# Patient Record
Sex: MALE | Race: WHITE | NOT HISPANIC OR LATINO | Employment: OTHER | URBAN - NONMETROPOLITAN AREA
[De-identification: names, ages, dates, MRNs, and addresses within clinical notes are randomized per-mention and may not be internally consistent; named-entity substitution may affect disease eponyms.]

---

## 2017-03-24 ENCOUNTER — HISTORY (OUTPATIENT)
Dept: EMERGENCY MEDICINE | Facility: OTHER | Age: 62
End: 2017-03-24

## 2017-03-27 ENCOUNTER — AMBULATORY - GICH (OUTPATIENT)
Dept: LAB | Facility: OTHER | Age: 62
End: 2017-03-27

## 2017-03-27 ENCOUNTER — AMBULATORY - GICH (OUTPATIENT)
Dept: SCHEDULING | Facility: OTHER | Age: 62
End: 2017-03-27

## 2017-03-28 ENCOUNTER — AMBULATORY - GICH (OUTPATIENT)
Dept: SCHEDULING | Facility: OTHER | Age: 62
End: 2017-03-28

## 2017-04-13 ENCOUNTER — AMBULATORY - GICH (OUTPATIENT)
Dept: SCHEDULING | Facility: OTHER | Age: 62
End: 2017-04-13

## 2017-04-14 ENCOUNTER — AMBULATORY - GICH (OUTPATIENT)
Dept: SCHEDULING | Facility: OTHER | Age: 62
End: 2017-04-14

## 2017-04-17 ENCOUNTER — AMBULATORY - GICH (OUTPATIENT)
Dept: SCHEDULING | Facility: OTHER | Age: 62
End: 2017-04-17

## 2017-04-18 ENCOUNTER — AMBULATORY - GICH (OUTPATIENT)
Dept: SCHEDULING | Facility: OTHER | Age: 62
End: 2017-04-18

## 2017-04-28 ENCOUNTER — AMBULATORY - GICH (OUTPATIENT)
Dept: SCHEDULING | Facility: OTHER | Age: 62
End: 2017-04-28

## 2017-05-17 ENCOUNTER — AMBULATORY - GICH (OUTPATIENT)
Dept: SCHEDULING | Facility: OTHER | Age: 62
End: 2017-05-17

## 2017-08-02 ENCOUNTER — AMBULATORY - GICH (OUTPATIENT)
Dept: SCHEDULING | Facility: OTHER | Age: 62
End: 2017-08-02

## 2017-08-08 ENCOUNTER — AMBULATORY - GICH (OUTPATIENT)
Dept: LAB | Facility: OTHER | Age: 62
End: 2017-08-08

## 2017-08-08 DIAGNOSIS — R18.8 OTHER ASCITES: ICD-10-CM

## 2017-08-08 DIAGNOSIS — K70.31 ALCOHOLIC CIRRHOSIS OF LIVER WITH ASCITES (H): ICD-10-CM

## 2017-08-08 DIAGNOSIS — B18.2 CHRONIC VIRAL HEPATITIS C (H): ICD-10-CM

## 2017-08-08 DIAGNOSIS — C22.0 LIVER CELL CARCINOMA (H): ICD-10-CM

## 2017-08-08 LAB
ABSOLUTE BASOPHILS - HISTORICAL: 0 THOU/CU MM
ABSOLUTE EOSINOPHILS - HISTORICAL: 0.1 THOU/CU MM
ABSOLUTE IMMATURE GRANULOCYTES(METAS,MYELOS,PROS) - HISTORICAL: 0 THOU/CU MM
ABSOLUTE LYMPHOCYTES - HISTORICAL: 0.6 THOU/CU MM (ref 0.9–2.9)
ABSOLUTE MONOCYTES - HISTORICAL: 0.3 THOU/CU MM
ABSOLUTE NEUTROPHILS - HISTORICAL: 1.4 THOU/CU MM (ref 1.7–7)
ALBUMIN SERPL-MCNC: 3 G/DL (ref 3.5–5.7)
ALP SERPL-CCNC: 119 IU/L (ref 34–104)
ALT (SGPT) - HISTORICAL: 11 IU/L (ref 7–52)
AST SERPL-CCNC: 20 IU/L (ref 13–39)
BASOPHILS # BLD AUTO: 1.2 %
BILIRUB SERPL-MCNC: 2.1 MG/DL (ref 0.3–1)
CREAT SERPL-MCNC: 0.91 MG/DL (ref 0.7–1.3)
EOSINOPHIL NFR BLD AUTO: 4.1 %
ERYTHROCYTE [DISTWIDTH] IN BLOOD BY AUTOMATED COUNT: 16.4 % (ref 11.5–15.5)
GFR IF NOT AFRICAN AMERICAN - HISTORICAL: >60 ML/MIN/1.73M2
HCT VFR BLD AUTO: 29.8 % (ref 37–53)
HEMOGLOBIN: 10.2 G/DL (ref 13.5–17.5)
IMMATURE GRANULOCYTES(METAS,MYELOS,PROS) - HISTORICAL: 0.4 %
INR - HISTORICAL: 1.7
LYMPHOCYTES NFR BLD AUTO: 23.6 % (ref 20–44)
MCH RBC QN AUTO: 31.1 PG (ref 26–34)
MCHC RBC AUTO-ENTMCNC: 34.2 G/DL (ref 32–36)
MCV RBC AUTO: 91 FL (ref 80–100)
MONOCYTES NFR BLD AUTO: 11.6 %
NEUTROPHILS NFR BLD AUTO: 59.1 % (ref 42–72)
PLATELET # BLD AUTO: 41 THOU/CU MM (ref 140–440)
PMV BLD: 11.5 FL (ref 6.5–11)
POTASSIUM SERPL-SCNC: 3.4 MMOL/L (ref 3.5–5.1)
PROTIME - HISTORICAL: 18.6 SEC (ref 11.9–15.2)
RED BLOOD COUNT - HISTORICAL: 3.28 MIL/CU MM (ref 4.3–5.9)
SODIUM SERPL-SCNC: 137 MMOL/L (ref 133–143)
WHITE BLOOD COUNT - HISTORICAL: 2.4 THOU/CU MM (ref 4.5–11)

## 2017-08-31 ENCOUNTER — AMBULATORY - GICH (OUTPATIENT)
Dept: SCHEDULING | Facility: OTHER | Age: 62
End: 2017-08-31

## 2017-09-18 ENCOUNTER — HISTORY (OUTPATIENT)
Dept: EMERGENCY MEDICINE | Facility: OTHER | Age: 62
End: 2017-09-18

## 2017-09-18 ENCOUNTER — AMBULATORY - GICH (OUTPATIENT)
Dept: RADIOLOGY | Facility: OTHER | Age: 62
End: 2017-09-18

## 2017-09-29 ENCOUNTER — HISTORY (OUTPATIENT)
Dept: EMERGENCY MEDICINE | Facility: OTHER | Age: 62
End: 2017-09-29

## 2017-10-23 ENCOUNTER — COMMUNICATION - GICH (OUTPATIENT)
Dept: FAMILY MEDICINE | Facility: OTHER | Age: 62
End: 2017-10-23

## 2017-10-24 ENCOUNTER — HISTORY (OUTPATIENT)
Dept: INTERNAL MEDICINE | Facility: OTHER | Age: 62
End: 2017-10-24

## 2017-10-24 ENCOUNTER — OFFICE VISIT - GICH (OUTPATIENT)
Dept: INTERNAL MEDICINE | Facility: OTHER | Age: 62
End: 2017-10-24

## 2017-10-24 ENCOUNTER — HOSPITAL ENCOUNTER (OUTPATIENT)
Dept: RADIOLOGY | Facility: OTHER | Age: 62
End: 2017-10-24
Attending: INTERNAL MEDICINE

## 2017-10-24 DIAGNOSIS — M17.0 PRIMARY OSTEOARTHRITIS OF BOTH KNEES: ICD-10-CM

## 2017-10-24 DIAGNOSIS — K76.9 LIVER DISEASE: ICD-10-CM

## 2017-10-24 DIAGNOSIS — K74.60 CIRRHOSIS OF LIVER (H): ICD-10-CM

## 2017-10-24 DIAGNOSIS — R77.2 ABNORMALITY OF ALPHA-FETOPROTEIN: ICD-10-CM

## 2017-10-24 DIAGNOSIS — Z86.19 PERSONAL HISTORY OF OTHER INFECTIOUS AND PARASITIC DISEASES: ICD-10-CM

## 2017-10-24 DIAGNOSIS — D61.818 OTHER PANCYTOPENIA (H): ICD-10-CM

## 2017-10-24 LAB
INR - HISTORICAL: 1.5
PROTIME - HISTORICAL: 17.9 SEC (ref 11.9–15.2)

## 2017-10-25 ENCOUNTER — AMBULATORY - GICH (OUTPATIENT)
Dept: INTERNAL MEDICINE | Facility: OTHER | Age: 62
End: 2017-10-25

## 2017-10-25 DIAGNOSIS — K74.60 CIRRHOSIS OF LIVER (H): ICD-10-CM

## 2017-10-25 DIAGNOSIS — R18.8 OTHER ASCITES: ICD-10-CM

## 2017-11-02 ENCOUNTER — HOSPITAL ENCOUNTER (OUTPATIENT)
Dept: RADIOLOGY | Facility: OTHER | Age: 62
End: 2017-11-02
Attending: INTERNAL MEDICINE

## 2017-11-02 DIAGNOSIS — K74.60 CIRRHOSIS OF LIVER (H): ICD-10-CM

## 2017-11-14 ENCOUNTER — HOSPITAL ENCOUNTER (OUTPATIENT)
Dept: RADIOLOGY | Facility: OTHER | Age: 62
End: 2017-11-14
Attending: INTERNAL MEDICINE

## 2017-11-14 DIAGNOSIS — K74.60 CIRRHOSIS OF LIVER (H): ICD-10-CM

## 2017-11-21 ENCOUNTER — HOSPITAL ENCOUNTER (OUTPATIENT)
Dept: RADIOLOGY | Facility: OTHER | Age: 62
End: 2017-11-21
Attending: INTERNAL MEDICINE

## 2017-11-21 ENCOUNTER — AMBULATORY - GICH (OUTPATIENT)
Dept: INTERNAL MEDICINE | Facility: OTHER | Age: 62
End: 2017-11-21

## 2017-11-21 DIAGNOSIS — K74.60 CIRRHOSIS OF LIVER (H): ICD-10-CM

## 2017-11-22 ENCOUNTER — AMBULATORY - GICH (OUTPATIENT)
Dept: INTERNAL MEDICINE | Facility: OTHER | Age: 62
End: 2017-11-22

## 2017-11-22 DIAGNOSIS — K74.60 CIRRHOSIS OF LIVER (H): ICD-10-CM

## 2017-11-29 ENCOUNTER — HOSPITAL ENCOUNTER (OUTPATIENT)
Dept: RADIOLOGY | Facility: OTHER | Age: 62
End: 2017-11-29
Attending: INTERNAL MEDICINE

## 2017-11-29 ENCOUNTER — AMBULATORY - GICH (OUTPATIENT)
Dept: LAB | Facility: OTHER | Age: 62
End: 2017-11-29

## 2017-11-29 DIAGNOSIS — K74.60 CIRRHOSIS OF LIVER (H): ICD-10-CM

## 2017-11-29 LAB
INR - HISTORICAL: 1.6
PROTIME - HISTORICAL: 19.6 SEC (ref 11.9–15.2)

## 2017-12-08 ENCOUNTER — HOSPITAL ENCOUNTER (OUTPATIENT)
Dept: RADIOLOGY | Facility: OTHER | Age: 62
End: 2017-12-08
Attending: INTERNAL MEDICINE

## 2017-12-08 DIAGNOSIS — K74.60 CIRRHOSIS OF LIVER (H): ICD-10-CM

## 2017-12-19 ENCOUNTER — HOSPITAL ENCOUNTER (OUTPATIENT)
Dept: RADIOLOGY | Facility: OTHER | Age: 62
End: 2017-12-19
Attending: INTERNAL MEDICINE

## 2017-12-19 DIAGNOSIS — K74.60 CIRRHOSIS OF LIVER (H): ICD-10-CM

## 2017-12-28 NOTE — MISCELLANEOUS
Patient Information     Patient Name MRN Sex Philipp Garnica [id number] Male 1955      Protocol by Maritza Armenta RN at 2017  1:35 PM     Author:  Maritza Armenta RN Service:  (none) Author Type:  NURS- Registered Nurse     Filed:  2017  1:35 PM Date of Service:  2017  1:35 PM Status:  Signed     :  Maritza Armenta RN (NURS- Registered Nurse)            Universal Protocol    A. Pre-procedure verification complete yes  1-relevant information / documentation available, reviewed and properly matched to the patient; 2-consent accurate and complete, 3-equipment and supplies available    B. Site marking complete Yes  Site marked if not in continuous attendance with patient    C. TIME OUT completed yes  Time Out was conducted just prior to starting procedure to verify the eight required elements: 1-patient identity, 2-consent accurate and complete, 3-position, 4-correct side/site marked (if applicable), 5-procedure, 6-relevant images / results properly labeled and displayed (if applicable), 7-antibiotics / irrigation fluids (if applicable), 8-safety precautions.

## 2017-12-28 NOTE — PATIENT INSTRUCTIONS
Patient Information     Patient Name MRN Philipp Mandujano [id number] Male 1955      Patient Instructions by Ramon Vinson MD at 10/24/2017 11:00 AM     Author:  Ramon Vinson MD Service:  (none) Author Type:  Physician     Filed:  10/24/2017 11:33 AM Encounter Date:  10/24/2017 Status:  Signed     :  Ramon Vinson MD (Physician)            1. Cirrhosis of liver with ascites, unspecified hepatic cirrhosis type (HC)  - US PARACENTESIS WITH IMAGING; Standing     -- schedule every 7 to 10 days.    -- give albumin 2 bottles -- if 5 or more liters removed.     - PROTIME-INR; Standing   -- get labs weekly if needed for paracentesis.     2. History of hepatitis C -- Treated with [facility] -- [facility] -- got Hep C from [facility] in [place]    Return as needed for follow-up with Dr. Vinson.    Clinic : [phone]  Appointment line: [phone]

## 2017-12-28 NOTE — OR POSTOP
Patient Information     Patient Name MRN Sex Philipp Garnica [id number] Male 1955      OR PostOp by Yesenia Law RN at 10/24/2017  2:06 PM     Author:  Yesenia Law RN  Service:  (none) Author Type:  NURS- Registered Nurse     Filed:  10/24/2017  3:20 PM  Date of Service:  10/24/2017  2:06 PM Status:  Addendum     :  Yesenia Law RN (NURS- Registered Nurse)        Related Notes: Original Note by Yesenia Law RN (NURS- Registered Nurse) filed at 10/24/2017  3:17 PM            Pressure held on insertion site for 2 minutes. Skin adhesive applied. Sterile 2x2 placed over insertion site and covered with a sterile tegaderm. 9600 mLs fluid removed from abdomen.

## 2017-12-28 NOTE — TELEPHONE ENCOUNTER
Patient Information     Patient Name MRN Philipp Mandujano [id number] Male 1955      Telephone Encounter by Lizzie Chance at 10/23/2017  7:21 AM     Author:  Lizzie Chance Service:  (none) Author Type:  (none)     Filed:  10/23/2017  7:30 AM Encounter Date:  10/23/2017 Status:  Signed     :  Lizzie Chance            Patient aware  is out.    Patient is looking to get something set up weekly to get the fluid out of his stomach. He normally has been going to the ER to have this done.     Lizzie Chance ....................  10/23/2017   7:23 AM

## 2017-12-28 NOTE — PROGRESS NOTES
Patient Information     Patient Name MRN Sex Philipp Garnica [id number] Male 1955      Progress Notes by Loyda Fishman RN at 2017 11:30 AM     Author:  Loyda Fishman RN Service:  (none) Author Type:  NURS- Registered Nurse     Filed:  2017 11:58 AM Date of Service:  2017 11:30 AM Status:  Signed     :  Loyda Fishman RN (NURS- Registered Nurse)            Paracentesis done with 7350 ml rust colored sl. cloudy fluid removed- pt states color is normal for him.  Skin adhesive, 2x2 and tegaderm applied.  To DSU for albumin infusion.

## 2017-12-28 NOTE — OR POSTOP
Patient Information     Patient Name MRN Sex Philipp Garnica [id number] Male 1955      OR PostOp by Yesenia Law RN at 2017  2:40 PM     Author:  Yesenia Law RN Service:  (none) Author Type:  NURS- Registered Nurse     Filed:  2017  2:42 PM Date of Service:  2017  2:40 PM Status:  Signed     :  Yesenia Law RN (NURS- Registered Nurse)            Pressure held on RUQ puncture  site for 2 minutes.  Skin adhesive applied, sterile 2x2 placed over insertion site and covered with a sterile tegaderm. Skin adhesive also applied to LLQ puncture from previous Parw.

## 2017-12-28 NOTE — PROGRESS NOTES
Patient Information     Patient Name MRN Sex Philipp Garnica [id number] Male 1955      Progress Notes by Maritza Armenta RN at 2017  2:15 PM     Author:  Maritza Armenta RN Service:  (none) Author Type:  NURS- Registered Nurse     Filed:  2017  2:32 PM Date of Service:  2017  2:15 PM Status:  Signed     :  Maritza Armenta RN (NURS- Registered Nurse)            Procedure completed. Dr. Larios called back to see patient, questions need to tap again. No further intervention done.  3,900ml clear orange fluid removed.   Left abd quadrant site covered with liquidband, sterile 2x2 and tegaderm.   Patient ambulated to DSU for albumin replacement.

## 2017-12-28 NOTE — MISCELLANEOUS
Patient Information     Patient Name MRN Sex Philipp Garnica [id number] Male 1955      Protocol by Yesenia Law RN at 10/24/2017  2:00 PM     Author:  Yesenia Law RN Service:  (none) Author Type:  NURS- Registered Nurse     Filed:  10/24/2017  2:26 PM Date of Service:  10/24/2017  2:00 PM Status:  Signed     :  Yesenia Law RN (NURS- Registered Nurse)            Universal Protocol    A. Pre-procedure verification complete yes  1-relevant information / documentation available, reviewed and properly matched to the patient; 2-consent accurate and complete, 3-equipment and supplies available    B. Site marking complete Yes  Site marked if not in continuous attendance with patient    C. TIME OUT completed yes  Time Out was conducted just prior to starting procedure to verify the eight required elements: 1-patient identity, 2-consent accurate and complete, 3-position, 4-correct side/site marked (if applicable), 5-procedure, 6-relevant images / results properly labeled and displayed (if applicable), 7-antibiotics / irrigation fluids (if applicable), 8-safety precautions.

## 2017-12-28 NOTE — PROGRESS NOTES
Patient Information     Patient Name MRN Sex Philipp Herr [id number] Male 1955      Progress Notes by Maritza Armenta RN at 2017  3:00 PM     Author:  Maritza Armenta RN Service:  (none) Author Type:  NURS- Registered Nurse     Filed:  2017  3:00 PM Date of Service:  2017  3:00 PM Status:  Signed     :  Maritza Armenta RN (NURS- Registered Nurse)            Discharge Note    Data:  Philipp Jefferson has been discharged home at 1500 via ambulatory accompanied by Registered Nurse.      Action:  Written discharge/follow-up instructions were provided to patient. Prescriptions : None.  Belongings sent with patient. Medications from home sent with patient/family: Not Applicable  Equipment none .     Response:  Patient verbalized understanding of discharge instructions, reason for discharge, and necessary follow-up appointments.

## 2017-12-28 NOTE — OR POSTOP
Patient Information     Patient Name MRN Sex Philipp Herr [id number] Male 1955      OR PostOp by Yesenia Law RN at 2017  3:36 PM     Author:  Yesenia Law RN Service:  (none) Author Type:  NURS- Registered Nurse     Filed:  2017  3:36 PM Date of Service:  2017  3:36 PM Status:  Signed     :  Yesenia Law RN (NURS- Registered Nurse)            Discharge Note    Data:  Philipp Jefferson has been discharged home at 0336 via ambulatory accompanied by Registered Nurse.      Action:  Written discharge/follow-up instructions were declined by patient. Prescriptions : None.  Belongings sent with patient. Medications from home sent with patient/family: Yes  Equipment none .     Response:  Patient verbalized understanding of discharge instructions, reason for discharge, and necessary follow-up appointments.

## 2017-12-28 NOTE — TELEPHONE ENCOUNTER
Patient Information     Patient Name MRN Philipp Mandujano [id number] Male 1955      Telephone Encounter by Keily Dumont at 10/23/2017  9:23 AM     Author:  Keily Dumont Service:  (none) Author Type:  (none)     Filed:  10/23/2017  9:24 AM Encounter Date:  10/23/2017 Status:  Signed     :  Keily Dumont            No answer on the patient's cell phone. [phone] is the patient's wife's number, we do not have a signed consent to speak with her.    Keily Dumont LPN.......................... 10/23/2017  9:24 AM

## 2017-12-28 NOTE — TELEPHONE ENCOUNTER
Patient Information     Patient Name MRN Philipp Madnujano [id number] Male 1955      Telephone Encounter by Kye Vazquez MD at 10/23/2017  9:06 AM     Author:  Kye Vazquez MD Service:  (none) Author Type:  Physician     Filed:  10/23/2017  9:06 AM Encounter Date:  10/23/2017 Status:  Signed     :  Kye Vazquez MD (Physician)            He should see one of our internal medicine specialists for a consult in this regard.

## 2017-12-28 NOTE — OR POSTOP
Patient Information     Patient Name MRN Sex Philipp Herr [id number] Male 1955      OR PostOp by Yesenia Law RN at 2017  3:28 PM     Author:  Yesenia Law RN Service:  (none) Author Type:  NURS- Registered Nurse     Filed:  2017  3:29 PM Date of Service:  2017  3:28 PM Status:  Signed     :  Yesenia Law RN (NURS- Registered Nurse)            Discharge Note    Data:  Philipp Jefferson has been discharged home at 1520 via ambulatory accompanied by Registered Nurse.      Action:  Written discharge/follow-up instructions were provided to patient. Prescriptions : None.  Belongings sent with patient. Medications from home sent with patient/family: Not Applicable  Equipment none .     Response:  Patient verbalized understanding of discharge instructions, reason for discharge, and necessary follow-up appointments.

## 2017-12-28 NOTE — MISCELLANEOUS
Patient Information     Patient Name MRN Sex Philipp Garnica [id number] Male 1955      Protocol by Yesenia Law RN at 2017  1:22 PM     Author:  Yesenia Law RN Service:  (none) Author Type:  NURS- Registered Nurse     Filed:  2017  2:40 PM Date of Service:  2017  1:22 PM Status:  Signed     :  Yesenia Law RN (NURS- Registered Nurse)            Universal Protocol    A. Pre-procedure verification complete yes  1-relevant information / documentation available, reviewed and properly matched to the patient; 2-consent accurate and complete, 3-equipment and supplies available    B. Site marking complete Yes  Site marked if not in continuous attendance with patient    C. TIME OUT completed yes  Time Out was conducted just prior to starting procedure to verify the eight required elements: 1-patient identity, 2-consent accurate and complete, 3-position, 4-correct side/site marked (if applicable), 5-procedure, 6-relevant images / results properly labeled and displayed (if applicable), 7-antibiotics / irrigation fluids (if applicable), 8-safety precautions.

## 2017-12-28 NOTE — OR POSTOP
Patient Information     Patient Name MRN Sex Philipp Garnica [id number] Male 1955      OR PostOp by Yesenia Law RN at 2017  2:42 PM     Author:  Yesenia Law RN Service:  (none) Author Type:  NURS- Registered Nurse     Filed:  2017  2:46 PM Date of Service:  2017  2:42 PM Status:  Signed     :  Yesenia Law RN (NURS- Registered Nurse)            Pressure held on RUQ puncture site for 2 minutes. Skin adhesive applied, sterile 2x2 placed over insertion site and covered with a sterile tegaderm. Skin adhesive also applied to LLQ puncture site from last week as it was it was leaking, sterile 2x2 placed over insertion site and covered with a sterile tegaderm.

## 2017-12-28 NOTE — MISCELLANEOUS
Patient Information     Patient Name MRN Sex Philipp Garnica [id number] Male 1955      Protocol by Loyda Fishman RN at 2017 10:41 AM     Author:  Loyda Fishman RN Service:  (none) Author Type:  NURS- Registered Nurse     Filed:  2017 11:57 AM Date of Service:  2017 10:41 AM Status:  Signed     :  Loyda Fishman RN (NURS- Registered Nurse)            Universal Protocol    A. Pre-procedure verification complete yes  1-relevant information / documentation available, reviewed and properly matched to the patient; 2-consent accurate and complete, 3-equipment and supplies available    B. Site marking complete Yes  Site marked if not in continuous attendance with patient    C. TIME OUT completed yes  Time Out was conducted just prior to starting procedure to verify the eight required elements: 1-patient identity, 2-consent accurate and complete, 3-position, 4-correct side/site marked (if applicable), 5-procedure, 6-relevant images / results properly labeled and displayed (if applicable), 7-antibiotics / irrigation fluids (if applicable), 8-safety precautions.

## 2017-12-28 NOTE — PROGRESS NOTES
Patient Information     Patient Name MRN Philipp Mandujano [id number] Male 1955      Progress Notes by Ramon Vinson MD at 10/24/2017 11:00 AM     Author:  Ramon Vinson MD Service:  (none) Author Type:  Physician     Filed:  10/24/2017  9:45 PM Encounter Date:  10/24/2017 Status:  Signed     :  Ramon Vinson MD (Physician)            Nursing Notes:   Selene Tomas  10/24/2017 11:26 AM  Signed  Pt states he has fluid retention x 3 weeks. Pt is requesting a 2 week schedule for fluid retention removal.  Selene LAMBERT Thom Jefferson presents to clinic today for:   Chief Complaint    Patient presents with      Edema     HPI: Mr. Jefferson is a 62 y.o. male who presents today for evaluation of above.     (K74.60) Cirrhosis of liver with ascites, unspecified hepatic cirrhosis type (HC)  (primary encounter diagnosis)  (Z86.19) History of hepatitis C -- Treated with [facility] -- [facility] -- got Hep C from [facility] in [place]  (M17.0) Bilateral primary osteoarthritis of knee  (D61.818) Pancytopenia (HC) -- due to Alcohol/Cirrhosis  (R77.2) High alpha fetoprotein (AFP) tumor marker  (K76.9) Liver lesion -- s/p ablation treatment by [facility] - 2017     Patient presents today for treatment of his edema and ascites.  Reports he is 3 weeks out from his last paracentesis.  He had to postpone his last treatment, because he had a liver lesion that needed to get ablated at [facility] in the last couple weeks.  States that his weight is up 20+ pounds.  Typically they're taking off anywhere from 7 a half to 10 L of ascites fluid with each paracentesis.    He is treated for hepatitis C through [facility].  Sounds like this is currently in remission.    Has bilateral knee arthritis.  Recently got steroid injections into both of them through sports medicine clinic at [facility].    Noted to have elevated alpha-fetoprotein lab values at [facility], I do not have  [facility] records to explain exactly what was found with his liver, suspect possible hepatocellular carcinoma given that he just had to have ablative treatment.    Mr. Bedollas Body mass index is 40.05 kg/(m^2). This is out of the normal range for a 62 y.o. Normal range for ages 18+ is between 18.5 and 24.9. To lose weight we reviewed risks and benefits of appropriate options such as diet, exercise, and medications. Patient's strategy will be  none; patient is not ready to act   BP Readings from Last 1 Encounters:10/24/17 : 130/74  Mr. Merchant blood pressure is out of the normal range for adults. Per JNC-8 guidelines normal adult blood pressure is < 120/80, pre-hypertensive is between 120/80 and 139/89, and hypertension is 140/90 or greater. Risks of hypertension were discussed. Patient's strategy will be reduced salt intake    Functional Capacity: about 4 METS.   Patient reports no current symptoms of fevers, chills, nausea/vomiting.   No cough.   + some shortness of breath -- due to extremely distended abdomen secondary to ascites.    No change in bowel/bladder habits. No melena, hematochezia. No Hematuria.   No rashes. No palpitations.  No orthopnea/paroxysmal nocturnal dyspnea   No vision or hearing issues.   No significant mood issues   + easy bruising.     KAITLIN:  No flowsheet data found.    PHQ9:  PHQ Depression Screening 5/11/2016 10/24/2017   Date of PHQ exam (doc flow) 5/11/2016 10/24/2017   1. Lack of interest/pleasure 0 - Not at all 0 - Not at all   2. Feeling down/depressed 0 - Not at all 0 - Not at all   PHQ-2 TOTAL SCORE 0 0   3. Trouble sleeping 2 - More than half the days -   4. Decreased energy 2 - More than half the days -   5. Appetite change 2 - More than half the days -   6. Feelings of failure 0 - Not at all -   7. Trouble concentrating 0 - Not at all -   8. Activity level 0 - Not at all -   9. Hurting yourself 0 - Not at all -   PHQ-9 TOTAL SCORE 6 -   PHQ-9 Severity Level mild -    Functional Impairment somewhat difficult -   Some recent data might be hidden          I have personally reviewed the past medical history, past surgical history, medications, allergies, family and social history as listed below, on 10/24/2017.    Patient Active Problem List       Diagnosis  Date Noted     Bilateral primary osteoarthritis of knee  10/24/2017     Pancytopenia (HC) -- due to Alcohol/Cirrhosis  10/24/2017     High alpha fetoprotein (AFP) tumor marker  10/24/2017     Liver lesion -- s/p ablation treatment by [facility] - Fall 2017  10/24/2017     Chondromalacia of left knee  05/16/2016     Bilateral carpal tunnel syndrome  05/12/2016     Mallet finger  12/13/2012     INSOMNIA  07/27/2012     CIRRHOSIS  07/26/2012     OTHER ASCITES  07/26/2012     SUBDURAL HEMATOMA, HX OF EVACUATION  07/26/2012     HYPERTENSION       NICOTINE ADDICTION       History of hepatitis C -- Treated with [facility] -- got Hep C from [facility] in [place]       Followed at [facility]          COLONIC POLYPS, HYPERPLASTIC  10/15/2009     Colonoscopy 10/15/09 Next due 2019          Past Medical History:     Diagnosis  Date     Chondromalacia of left knee 5/16/2016     Subarachnoid bleed (HC) 10/03/2007    Right frontal temporal subarachnoid bleed secondary to trauma      Past Surgical History:      Procedure  Laterality Date     CRANIOTOMY  10/04/07    [facility]       CYST REMOVAL      Under his right eyelid ~10/04/07Craniotomy for subarachnoid bleed at [facility]       Current Outpatient Prescriptions       Medication  Sig Dispense Refill     aMILoride (MIDAMOR) 5 mg tablet Take 2 tablets by mouth 2 times daily. Take until seen in clinic next week. 30 tablet 0     calcium citrate-vitamin d 315 mg-200 unit (CITRACAL + D) 315-200 mg-unit tablet Take 2 tabs twice daily  0     Cholecalciferol, Vitamin D3, (VITAMIN D-3) 5,000 unit Tab Take 50,000  units po weekly for 12 weeks  0     ciprofloxacin (CIPRO) 500 mg tablet Take 1 tablet by mouth once daily.       furosemide (LASIX) 40 mg tablet Take 2 tabs po twice daily until seen in clinic. 30 tablet 0     gabapentin (NEURONTIN) 300 mg capsule Take 300 mg by mouth.       GENERLAC 10 gram/15 mL solution Take  by mouth once daily.       phytonadione (MEPHYTON) 5 mg tablet TAKE 1 TABLET BY MOUTH EVERY DAY       potassium chloride (MICRO-K) 10 mEq Controlled-release capsule Take 10 mEq by mouth.       pramipexole (MIRAPEX) 0.125 mg tablet   0     pramipexole (MIRAPEX) 0.25 mg tablet Take  by mouth.       propranolol (INDERAL) 40 mg tablet TAKE 1 TABLET BY MOUTH TWICE DAILY 180 tablet 0     rifaximin (XIFAXAN) 550 mg tablet Take 1 tablet by mouth 2 times daily.  0     vitamin a (AQUASOL A) 10,000 unit capsule Take 1 capsule by mouth once daily.  0     vitamin e 400 unit capsule Take 400 Units by mouth.       zinc sulfate 220 mg cap Take 1 capsule by mouth once daily.  0     No Known Allergies  No family history on file.  Family Status     Relation  Status     Mother Alive    Gastric bypass and Parkinson's disease       Social History     Social History        Marital status:       Spouse name: N/A     Number of children:  N/A     Years of education:  N/A     Social History Main Topics         Smoking status:   Current Every Day Smoker     Packs/day:  0.50     Types:  Cigarettes     Smokeless tobacco:   Never Used      Comment: on patch- in  process of quiting      Alcohol use   No      Comment: -- History of alcohol abuse      Drug use:   No     Sexual activity:   Not on file     Other Topics  Concern     Exercise No     Seat Belt Yes     Social History Narrative     Patient currently smokes.    Previously drank Alcohol regularly - 6 to 8 beers per week -- now avoids.      - 2 children      Got a tattoo while at [place] -- believes that it was probably a dirty needle, has never used IV drugs.  Otherwise  "uncertain where he would've contracted hepatitis C.    Unemployed.      As of October 2017, has been needing paracentesis treatments about every 10-14 days.     Pertinent ROS was performed and was negative as noted in HPI above.     EXAM:   Vitals:     10/24/17 1100   BP: 130/74   Pulse: 88   Resp: 18   Weight: 119.5 kg (263 lb 6.4 oz)     BP Readings from Last 3 Encounters:    10/24/17 135/90   10/24/17 130/74   09/29/17 146/68     Wt Readings from Last 3 Encounters:    10/24/17 119.5 kg (263 lb 6.4 oz)   09/29/17 122.5 kg (270 lb)   09/18/17 123.3 kg (271 lb 12.8 oz)     Estimated body mass index is 40.05 kg/(m^2) as calculated from the following:    Height as of 9/29/17: 1.727 m (5' 8\").    Weight as of this encounter: 119.5 kg (263 lb 6.4 oz).     EXAM:  Constitutional: well groomed / good hygiene, casual dress   Nose/Mouth: Oral pharynx without erythema or exudates and Nose is patent bilaterally, no rhinorrhea   Eyes:  Extraocular muscles intact, Sclera non-icteric, Conjunctiva without erythema  Lymphatic Exam: Non-palpable nodes in neck, clavicular regions  Pulmonary: Lungs are clear to auscultation bilaterally, without wheezes or crackles  Cardiovascular Exam: regular rate and rhythm, 2+ pedal edema present  Gastrointestinal Exam: soft, Obese, ++ Severely distended with ascitic fluid.   Integument: No abnormal rashes, sores, or ulcerations noted  Neurologic Exam: CN 3-12 grossly intact   Musculoskeletal Exam: Gait and station appear grossly normal  Psychiatric Exam: Awake and Alert, Affect and mood appropriate  Speech is fluent, Thought process is normal    INVESTIGATIONS:    Results for orders placed or performed in visit on 10/24/17      PROTIME-INR      Result  Value Ref Range    INR 1.5 (H) <1.3    PROTIME 17.9 (H) 11.9 - 15.2 sec      10/24/2017 - EXAM: Ultrasound guided paracentesis     CLINICAL HISTORY: Ascites     PROCEDURE: Ultrasound scanning was performed of the abdomen to localize the largest " pocket of ascites in the right lower quadrant. Informed consent was obtained and a timeout procedure was performed. The area was prepped and draped in the usual sterile fashion. One percent lidocaine was used for local anesthesia. A peritoneal catheter was then advanced into the ascites and 9600 mL of clear yellow fluid was drained. The patient tolerated the procedure well without immediate complication.     The patient received albumin per protocol.     Impression: Ultrasound-guided paracentesis with removal of 9600 mL ascites.     Electronically Signed By: Yamile Snider M.D. on 10/24/2017 3:01 PM    Results for orders placed or performed during the hospital encounter of 09/29/17      COMP METABOLIC PANEL      Result  Value Ref Range    SODIUM 142 133 - 143 mmol/L    POTASSIUM 3.7 3.5 - 5.1 mmol/L    CHLORIDE 106 98 - 107 mmol/L    CO2,TOTAL 20 (L) 21 - 31 mmol/L    ANION GAP 16 5 - 18                    GLUCOSE 103 70 - 105 mg/dL    CALCIUM 8.5 (L) 8.6 - 10.3 mg/dL    BUN 16 7 - 25 mg/dL    CREATININE 1.03 0.70 - 1.30 mg/dL    BUN/CREAT RATIO           16                    GFR if African American >60 >60 ml/min/1.73m2    GFR if not African American >60 >60 ml/min/1.73m2    ALBUMIN 3.1 (L) 3.5 - 5.7 g/dL    PROTEIN,TOTAL 5.7 (L) 6.4 - 8.9 g/dL    GLOBULIN                  2.6 2.0 - 3.7 g/dL    A/G RATIO 1.2 1.0 - 2.0                    BILIRUBIN,TOTAL 2.0 (H) 0.3 - 1.0 mg/dL    ALK PHOSPHATASE 122 (H) 34 - 104 IU/L    ALT (SGPT) 16 7 - 52 IU/L    AST (SGOT) 24 13 - 39 IU/L   LIPASE      Result  Value Ref Range    LIPASE 33.3 11.0 - 82.0 IU/L   CBC WITH AUTO DIFFERENTIAL      Result  Value Ref Range    WHITE BLOOD COUNT         3.1 (L) 4.5 - 11.0 thou/cu mm    RED BLOOD COUNT           3.21 (L) 4.30 - 5.90 mil/cu mm    HEMOGLOBIN                9.6 (L) 13.5 - 17.5 g/dL    HEMATOCRIT                29.2 (L) 37.0 - 53.0 %    MCV                       91 80 - 100 fL    MCH                       29.9 26.0 - 34.0 pg     MCHC                      32.9 32.0 - 36.0 g/dL    RDW                       17.8 (H) 11.5 - 15.5 %    PLATELET COUNT            37 (L) 140 - 440 thou/cu mm    MPV                       10.9 6.5 - 11.0 fL    NEUTROPHILS               70.5 42.0 - 72.0 %    LYMPHOCYTES               13.4 (L) 20.0 - 44.0 %    MONOCYTES                 11.1 <12.0 %    EOSINOPHILS               4.1 <8.0 %    BASOPHILS                 0.6 <3.0 %    IMMATURE GRANULOCYTES(METAS,MYELOS,PROS) 0.3 %    ABSOLUTE NEUTROPHILS      2.2 1.7 - 7.0 thou/cu mm    ABSOLUTE LYMPHOCYTES      0.4 (L) 0.9 - 2.9 thou/cu mm    ABSOLUTE MONOCYTES        0.4 <0.9 thou/cu mm    ABSOLUTE EOSINOPHILS      0.1 <0.5 thou/cu mm    ABSOLUTE BASOPHILS        0.0 <0.3 thou/cu mm    ABSOLUTE IMMATURE GRANULOCYTES(METAS,MYELOS,PROS) 0.0 <=0.3 thou/cu mm   PROTIME-INR      Result  Value Ref Range    INR 1.5 (H) <1.3    PROTIME 18.2 (H) 11.9 - 15.2 sec   APTT      Result  Value Ref Range    APTT 31 26 - 39 sec       ASSESSMENT AND PLAN:  Philipp was seen today for edema.    Diagnoses and all orders for this visit:    Cirrhosis of liver with ascites, unspecified hepatic cirrhosis type (HC)  -     US PARACENTESIS WITH IMAGING; Standing  -     PROTIME-INR; Standing  -     PROTIME-INR    History of hepatitis C -- Treated with [facility] -- got Hep C from [facility] [facility] in [place]    Bilateral primary osteoarthritis of knee    Pancytopenia (HC) -- due to Alcohol/Cirrhosis    High alpha fetoprotein (AFP) tumor marker    Liver lesion -- s/p ablation treatment by [facility] - Fall 2017    lab results and schedule of future lab studies reviewed with patient, reviewed diet, exercise and weight control, recommended sodium restriction    -- Expected clinical course discussed   -- Medications and their side effects discussed    25 minutes spent in face-to-face interaction with patient with greater than 50% spent in counseling and care coordination of listed  medical problems.      Philipp is also recommended to eat a heart-healthy diet, do regular aerobic exercises, maintain a desirable body weight, and avoid tobacco products. These recommendations are from the American Heart Association (AHA) which stresses the importance of lifestyle changes to lower cardiovascular disease risk.     Return if symptoms worsen or fail to improve.    Patient Instructions   1. Cirrhosis of liver with ascites, unspecified hepatic cirrhosis type (HC)  - US PARACENTESIS WITH IMAGING; Standing     -- schedule every 7 to 10 days.    -- give albumin 2 bottles -- if 5 or more liters removed.     - PROTIME-INR; Standing   -- get labs weekly if needed for paracentesis.     2. History of hepatitis C -- Treated with [facility] -- [facility] -- got Hep C from [facility] [facility] in [place]    Return as needed for follow-up with Dr. Vinson.    Clinic : [phone]  Appointment line: [phone]      Ramon Vinson MD

## 2017-12-28 NOTE — MISCELLANEOUS
Patient Information     Patient Name MRN Sex Philipp Garnica [id number] Male 1955      Protocol by Yesenia Law RN at 2017  1:26 PM     Author:  Yesenia Law RN Service:  (none) Author Type:  NURS- Registered Nurse     Filed:  2017  2:42 PM Date of Service:  2017  1:26 PM Status:  Signed     :  Yesenia Law RN (NURS- Registered Nurse)            Universal Protocol    A. Pre-procedure verification complete yes  1-relevant information / documentation available, reviewed and properly matched to the patient; 2-consent accurate and complete, 3-equipment and supplies available    B. Site marking complete Yes  Site marked if not in continuous attendance with patient    C. TIME OUT completed yes  Time Out was conducted just prior to starting procedure to verify the eight required elements: 1-patient identity, 2-consent accurate and complete, 3-position, 4-correct side/site marked (if applicable), 5-procedure, 6-relevant images / results properly labeled and displayed (if applicable), 7-antibiotics / irrigation fluids (if applicable), 8-safety precautions.

## 2017-12-28 NOTE — TELEPHONE ENCOUNTER
Patient Information     Patient Name MRN Sex Philipp Garnica [id number] Male 1955      Telephone Encounter by Keily Dumont at 10/23/2017  8:51 AM     Author:  Keily Dumont Service:  (none) Author Type:  (none)     Filed:  10/23/2017  8:52 AM Encounter Date:  10/23/2017 Status:  Signed     :  Keily Dumont            Patient has been having paracentesis done in the ER due to a ascites. He is wanting to have something set up weekly so that he doesn't have to go to the ER for this. Please advise as Ole Hearn MD is out of the office.    Keily Dumont LPN.......................... 10/23/2017  8:52 AM

## 2017-12-28 NOTE — OR POSTOP
Patient Information     Patient Name MRN Sex Philipp Herr [id number] Male 1955      OR PostOp by Yesenia Law RN at 10/24/2017  3:52 PM     Author:  Yesenia Law RN Service:  (none) Author Type:  NURS- Registered Nurse     Filed:  10/24/2017  3:54 PM Date of Service:  10/24/2017  3:52 PM Status:  Signed     :  Yesenia Law RN (NURS- Registered Nurse)            Discharge Note    Data:  Philipp Jefferson has been discharged home at 1552 via ambulatory accompanied by Registered Nurse.      Action:  Written discharge/follow-up instructions were refused by  patient. Prescriptions : None.  Belongings sent with patient. Medications from home sent with patient/family: Not Applicable  Equipment none .     Response:  Patient verbalized understanding of discharge instructions, reason for discharge, and necessary follow-up appointments.

## 2017-12-29 ENCOUNTER — AMBULATORY - GICH (OUTPATIENT)
Dept: LAB | Facility: OTHER | Age: 62
End: 2017-12-29

## 2017-12-29 ENCOUNTER — HOSPITAL ENCOUNTER (OUTPATIENT)
Dept: RADIOLOGY | Facility: OTHER | Age: 62
End: 2017-12-29
Attending: INTERNAL MEDICINE

## 2017-12-29 DIAGNOSIS — K74.60 CIRRHOSIS OF LIVER (H): ICD-10-CM

## 2017-12-29 LAB
INR - HISTORICAL: 1.5
PROTIME - HISTORICAL: 17.6 SEC (ref 11.9–15.2)

## 2017-12-30 NOTE — NURSING NOTE
Patient Information     Patient Name MRN Philipp Mandujano [id number] Male 1955      Nursing Note by Selene Tomas at 10/24/2017 11:00 AM     Author:  Selene Tomas Service:  (none) Author Type:  (none)     Filed:  10/24/2017 11:26 AM Encounter Date:  10/24/2017 Status:  Signed     :  Selene Tomas            Pt states he has fluid retention x 3 weeks. Pt is requesting a 2 week schedule for fluid retention removal.  Selene Tomas

## 2018-01-09 ENCOUNTER — HOSPITAL ENCOUNTER (OUTPATIENT)
Dept: RADIOLOGY | Facility: OTHER | Age: 63
End: 2018-01-09
Attending: INTERNAL MEDICINE

## 2018-01-09 DIAGNOSIS — K74.60 CIRRHOSIS OF LIVER (H): ICD-10-CM

## 2018-01-16 ENCOUNTER — HOSPITAL ENCOUNTER (OUTPATIENT)
Dept: RADIOLOGY | Facility: OTHER | Age: 63
End: 2018-01-16
Attending: INTERNAL MEDICINE

## 2018-01-16 DIAGNOSIS — K74.60 CIRRHOSIS OF LIVER (H): ICD-10-CM

## 2018-01-23 ENCOUNTER — HOSPITAL ENCOUNTER (OUTPATIENT)
Dept: RADIOLOGY | Facility: OTHER | Age: 63
End: 2018-01-23
Attending: INTERNAL MEDICINE

## 2018-01-23 DIAGNOSIS — K74.60 CIRRHOSIS OF LIVER (H): ICD-10-CM

## 2018-01-26 VITALS
HEART RATE: 88 BPM | DIASTOLIC BLOOD PRESSURE: 74 MMHG | WEIGHT: 263.4 LBS | SYSTOLIC BLOOD PRESSURE: 130 MMHG | RESPIRATION RATE: 18 BRPM

## 2018-01-30 ENCOUNTER — AMBULATORY - GICH (OUTPATIENT)
Dept: LAB | Facility: OTHER | Age: 63
End: 2018-01-30

## 2018-01-30 ENCOUNTER — HOSPITAL ENCOUNTER (OUTPATIENT)
Dept: RADIOLOGY | Facility: OTHER | Age: 63
End: 2018-01-30
Attending: INTERNAL MEDICINE

## 2018-01-30 DIAGNOSIS — K74.60 CIRRHOSIS OF LIVER (H): ICD-10-CM

## 2018-01-30 LAB
INR - HISTORICAL: 1.5
PROTIME - HISTORICAL: 18.2 SEC (ref 11.9–15.2)

## 2018-02-06 ENCOUNTER — HOSPITAL ENCOUNTER (OUTPATIENT)
Dept: RADIOLOGY | Facility: OTHER | Age: 63
End: 2018-02-06
Attending: INTERNAL MEDICINE

## 2018-02-06 DIAGNOSIS — K74.60 CIRRHOSIS OF LIVER (H): ICD-10-CM

## 2018-02-07 ENCOUNTER — DOCUMENTATION ONLY (OUTPATIENT)
Dept: FAMILY MEDICINE | Facility: OTHER | Age: 63
End: 2018-02-07

## 2018-02-07 PROBLEM — R77.2 HIGH ALPHA FETOPROTEIN (AFP) TUMOR MARKER: Status: ACTIVE | Noted: 2017-10-24

## 2018-02-07 PROBLEM — D61.818 PANCYTOPENIA (H): Status: ACTIVE | Noted: 2017-10-24

## 2018-02-07 PROBLEM — K76.9 LIVER LESION: Status: ACTIVE | Noted: 2017-10-24

## 2018-02-07 PROBLEM — I10 HYPERTENSION: Status: ACTIVE | Noted: 2018-02-07

## 2018-02-07 PROBLEM — F17.200 NICOTINE ADDICTION: Status: ACTIVE | Noted: 2018-02-07

## 2018-02-07 PROBLEM — M17.0 BILATERAL PRIMARY OSTEOARTHRITIS OF KNEE: Status: ACTIVE | Noted: 2017-10-24

## 2018-02-07 PROBLEM — Z86.19 HISTORY OF HEPATITIS C: Status: ACTIVE | Noted: 2018-02-07

## 2018-02-07 RX ORDER — FUROSEMIDE 40 MG
4 TABLET ORAL DAILY
COMMUNITY
Start: 2017-03-24 | End: 2018-05-01

## 2018-02-07 RX ORDER — PROPRANOLOL HYDROCHLORIDE 40 MG/1
40 TABLET ORAL 2 TIMES DAILY
COMMUNITY
Start: 2015-08-06 | End: 2018-07-21

## 2018-02-07 RX ORDER — CIPROFLOXACIN 500 MG/1
1 TABLET, FILM COATED ORAL DAILY
COMMUNITY
Start: 2014-10-21 | End: 2018-07-21

## 2018-02-07 RX ORDER — AMILORIDE HYDROCHLORIDE 5 MG/1
40 TABLET ORAL DAILY
COMMUNITY
Start: 2017-03-24 | End: 2018-05-01

## 2018-02-07 RX ORDER — PHYTONADIONE 5 MG/1
1 TABLET ORAL DAILY
COMMUNITY
Start: 2017-06-28 | End: 2018-07-21

## 2018-02-12 NOTE — PROGRESS NOTES
Patient Information     Patient Name MRN Sex Philipp Garnica [id number] Male 1955      Progress Notes by Maritza Armenta RN at 2017  2:12 PM     Author:  Maritza Armenta RN Service:  (none) Author Type:  NURS- Registered Nurse     Filed:  2017  2:15 PM Date of Service:  2017  2:12 PM Status:  Signed     :  Maritza Armenta RN (NURS- Registered Nurse)            Patient tolerated the procedure without complications.  7,500ml clear cayden fluid removed from left abd site. Pressure held for 3 minutes. Liquidband skin adhesive used then covered with sterile 2x2 gauze and tegaderm.   Patient ambulated to DSU.

## 2018-02-12 NOTE — MISCELLANEOUS
Patient Information     Patient Name MRN Sex Philipp Garnica [id number] Male 1955      Protocol by Loyda Fishman RN at 2018  8:40 AM     Author:  Loyda Fishman RN Service:  (none) Author Type:  NURS- Registered Nurse     Filed:  2018 10:35 AM Date of Service:  2018  8:40 AM Status:  Signed     :  Loyda Fishman RN (NURS- Registered Nurse)            Universal Protocol    A. Pre-procedure verification complete yes  1-relevant information / documentation available, reviewed and properly matched to the patient; 2-consent accurate and complete, 3-equipment and supplies available    B. Site marking complete Yes  Site marked if not in continuous attendance with patient    C. TIME OUT completed yes  Time Out was conducted just prior to starting procedure to verify the eight required elements: 1-patient identity, 2-consent accurate and complete, 3-position, 4-correct side/site marked (if applicable), 5-procedure, 6-relevant images / results properly labeled and displayed (if applicable), 7-antibiotics / irrigation fluids (if applicable), 8-safety precautions.

## 2018-02-12 NOTE — OR POSTOP
Patient Information     Patient Name MRN Sex Philipp Herr [id number] Male 1955      OR PostOp by Yesneia Law RN at 2017  8:16 AM     Author:  Yesenia Law RN Service:  (none) Author Type:  NURS- Registered Nurse     Filed:  2017  3:11 PM Date of Service:  2017  8:16 AM Status:  Signed     :  Yesenia Law RN (NURS- Registered Nurse)            Discharge Note    Data:  Philipp Jefferson has been discharged home at 1502 via ambulatory accompanied by Registered Nurse.      Action:  Written discharge/follow-up instructions were declined by patient. Prescriptions : None.  Belongings sent with patient. Medications from home sent with patient/family: Not Applicable  Equipment none .     Response:  Patient verbalized understanding of discharge instructions, reason for discharge, and necessary follow-up appointments.

## 2018-02-12 NOTE — PROGRESS NOTES
Patient Information     Patient Name MRN Sex Philipp Garnica [id number] Male 1955      Progress Notes by Maritza Armenta RN at 2017  2:05 PM     Author:  Maritza Armenta RN Service:  (none) Author Type:  NURS- Registered Nurse     Filed:  2017  2:50 PM Date of Service:  2017  2:05 PM Status:  Signed     :  Maritza Armenta RN (NURS- Registered Nurse)            Patient tolerated paracentesis without complications.   Right abd quadrant procedural site used.   7,900ml clear orange fluid removed. Pressure held on site for 3 minutes and Liquidband regular tip used to seal the site. Sterile 2x2 gauze and tegaderm placed on site.   Patient ambulated to DSU for albumin.

## 2018-02-12 NOTE — PROGRESS NOTES
Patient Information     Patient Name MRN Sex Philipp Garnica [id number] Male 1955      Progress Notes by Maritza Armenta RN at 2017  1:26 PM     Author:  Maritza Armenta RN Service:  (none) Author Type:  NURS- Registered Nurse     Filed:  2017  1:40 PM Date of Service:  2017  1:26 PM Status:  Signed     :  Maritza Armenta RN (NURS- Registered Nurse)            Universal Protocol    A. Pre-procedure verification complete yes  1-relevant information / documentation available, reviewed and properly matched to the patient; 2-consent accurate and complete, 3-equipment and supplies available    B. Site marking complete Yes  Site marked if not in continuous attendance with patient    C. TIME OUT completed yes  Time Out was conducted just prior to starting procedure to verify the eight required elements: 1-patient identity, 2-consent accurate and complete, 3-position, 4-correct side/site marked (if applicable), 5-procedure, 6-relevant images / results properly labeled and displayed (if applicable), 7-antibiotics / irrigation fluids (if applicable), 8-safety precautions.

## 2018-02-12 NOTE — OR POSTOP
Patient Information     Patient Name MRN Sex Philipp Garnica [id number] Male 1955      OR PostOp by Yesenia Law RN at 2017  8:16 AM     Author:  Yesenia Law RN Service:  (none) Author Type:  NURS- Registered Nurse     Filed:  2017  2:31 PM Date of Service:  2017  8:16 AM Status:  Signed     :  Yesenia Law RN (NURS- Registered Nurse)            Pressure held on RUQ puncture site for 4 minutes. Skin adhesive x 2 applied, sterile 2x2 placed over insertion site and covered with a sterile tegaderm. 7800 mLs removed.

## 2018-02-12 NOTE — MISCELLANEOUS
Patient Information     Patient Name MRN Sex Philipp Garnica [id number] Male 1955      Protocol by Yesenia Law RN at 2017  1:08 PM     Author:  Yesenia Law RN Service:  (none) Author Type:  NURS- Registered Nurse     Filed:  2017  2:31 PM Date of Service:  2017  1:08 PM Status:  Signed     :  Yesenia Law RN (NURS- Registered Nurse)            Universal Protocol    A. Pre-procedure verification complete yes  1-relevant information / documentation available, reviewed and properly matched to the patient; 2-consent accurate and complete, 3-equipment and supplies available    B. Site marking complete Yes  Site marked if not in continuous attendance with patient    C. TIME OUT completed yes  Time Out was conducted just prior to starting procedure to verify the eight required elements: 1-patient identity, 2-consent accurate and complete, 3-position, 4-correct side/site marked (if applicable), 5-procedure, 6-relevant images / results properly labeled and displayed (if applicable), 7-antibiotics / irrigation fluids (if applicable), 8-safety precautions.

## 2018-02-13 ENCOUNTER — HOSPITAL ENCOUNTER (OUTPATIENT)
Dept: ULTRASOUND IMAGING | Facility: OTHER | Age: 63
Discharge: HOME | End: 2018-02-13
Attending: INTERNAL MEDICINE | Admitting: INTERNAL MEDICINE
Payer: COMMERCIAL

## 2018-02-13 VITALS
RESPIRATION RATE: 16 BRPM | OXYGEN SATURATION: 99 % | SYSTOLIC BLOOD PRESSURE: 138 MMHG | HEART RATE: 75 BPM | TEMPERATURE: 98.1 F | DIASTOLIC BLOOD PRESSURE: 71 MMHG

## 2018-02-13 DIAGNOSIS — K74.60 CIRRHOSIS OF LIVER WITH ASCITES, UNSPECIFIED HEPATIC CIRRHOSIS TYPE (H): ICD-10-CM

## 2018-02-13 DIAGNOSIS — R18.8 CIRRHOSIS OF LIVER WITH ASCITES, UNSPECIFIED HEPATIC CIRRHOSIS TYPE (H): ICD-10-CM

## 2018-02-13 PROCEDURE — 49083 ABD PARACENTESIS W/IMAGING: CPT

## 2018-02-13 PROCEDURE — 25000128 H RX IP 250 OP 636: Performed by: INTERNAL MEDICINE

## 2018-02-13 PROCEDURE — 25000125 ZZHC RX 250: Performed by: RADIOLOGY

## 2018-02-13 PROCEDURE — P9047 ALBUMIN (HUMAN), 25%, 50ML: HCPCS | Performed by: INTERNAL MEDICINE

## 2018-02-13 PROCEDURE — 27210238 ZZH NEEDLE CATHETER

## 2018-02-13 RX ORDER — ALBUMIN (HUMAN) 12.5 G/50ML
50 SOLUTION INTRAVENOUS ONCE
Status: COMPLETED | OUTPATIENT
Start: 2018-02-13 | End: 2018-02-13

## 2018-02-13 RX ADMIN — ALBUMIN (HUMAN) 50 G: 0.25 INJECTION, SOLUTION INTRAVENOUS at 14:36

## 2018-02-13 RX ADMIN — LIDOCAINE HYDROCHLORIDE 10 ML: 10 INJECTION, SOLUTION INFILTRATION; PERINEURAL at 13:50

## 2018-02-13 NOTE — PROGRESS NOTES
Patient Information     Patient Name MRN Sex Philipp Herr [id number] Male 1955      Progress Notes by Maritza Armenta RN at 2018 10:34 AM     Author:  Maritza Armenta RN Service:  (none) Author Type:  NURS- Registered Nurse     Filed:  2018 10:35 AM Date of Service:  2018 10:34 AM Status:  Signed     :  Maritza Armenta RN (NURS- Registered Nurse)            Discharge Note    Data:  Philipp Jefferson has been discharged home at 1030 via ambulatory accompanied by Registered Nurse.      Action:  Written discharge/follow-up instructions were provided to were declined by the patient. Prescriptions : None.  Belongings sent with patient. Medications from home sent with patient/family: Not Applicable  Equipment none .     Response:  Patient verbalized understanding of discharge instructions, reason for discharge, and necessary follow-up appointments.

## 2018-02-13 NOTE — MISCELLANEOUS
Patient Information     Patient Name MRN Sex Philipp Garnica [id number] Male 1955      Protocol by Maritza Armenta RN at 2018  8:46 AM     Author:  Maritza Armenta RN Service:  (none) Author Type:  NURS- Registered Nurse     Filed:  2018 10:32 AM Date of Service:  2018  8:46 AM Status:  Signed     :  Maritza Armenta RN (NURS- Registered Nurse)            Universal Protocol    A. Pre-procedure verification complete yes  1-relevant information / documentation available, reviewed and properly matched to the patient; 2-consent accurate and complete, 3-equipment and supplies available    B. Site marking complete Yes  Site marked if not in continuous attendance with patient    C. TIME OUT completed yes  Time Out was conducted just prior to starting procedure to verify the eight required elements: 1-patient identity, 2-consent accurate and complete, 3-position, 4-correct side/site marked (if applicable), 5-procedure, 6-relevant images / results properly labeled and displayed (if applicable), 7-antibiotics / irrigation fluids (if applicable), 8-safety precautions.

## 2018-02-13 NOTE — PROGRESS NOTES
Patient Information     Patient Name MRN Sex Philipp Garnica [id number] Male 1955      Progress Notes by Layla Diaz RN at 2018  9:10 AM     Author:  Layla Diaz RN Service:  (none) Author Type:  NURS- Registered Nurse     Filed:  2018  9:48 AM Date of Service:  2018  9:10 AM Status:  Signed     :  Layla Diaz RN (NURS- Registered Nurse)            Paracentesis complete, patient tolerated well. Catheter removed after 6200 ml of dark cayden clear fluid removed.Pressure held to site on right abdomen for 5 minutes followed by glue closure, folded 2x2 and Tegaderm. VSS, patient to DSU for albumin infusion.

## 2018-02-13 NOTE — DISCHARGE INSTRUCTIONS
ULTRASOUND GUIDED THORACENTESIS    Ultrasound guided thoracentesis is a procedure which involves the insertion of a needle into your chest to remove fluid from the space between the lungs and wall of your chest.  This is done to find out the cause of the extra fluid and/or to relieve the symptoms caused by it.  Because this requires the insertion of a needle into the chest, there is a small risk of bleeding, infection, and/or collapse of the lung, which could result in some shortness of breath.          ACTIVITY:  Rest the remainder of the day.  You may resume normal activity the next day.  Avoid any vigorous physical activity for 24 hours    COMFORT:  If you have discomfort or tenderness at the site you may take your usual or recommended pain medication.  Do not take aspirin the day of the procedure.    DIET:   You may resume your usual diet    CARE OF SITE:  Keep the bandage on for 24 hours.  Then you may remove the bandage and shower.  You may put on a clean Band-Aid or leave it open to air.    RETURN TO AN EMERGENCY ROOM FOR:    Shortness of breath    Rapid heart rate    Pain becomes worse    CALL YOUR DOCTOR FOR:    A fever over 101 degrees    Increased redness, increased swelling, and/or persistent drainage/discomfort around the site    For questions, problems or concerns, contact the Radiology Department at [phone]

## 2018-02-13 NOTE — IP AVS SNAPSHOT
[facility]    [address]    [place] [address]    Phone:  [phone]    Fax:  [phone]                                       After Visit Summary   2/13/2018    Philipp Jefferson    MRN: [id number]           After Visit Summary Signature Page     I have received my discharge instructions, and my questions have been answered. I have discussed any challenges I see with this plan with the nurse or doctor.    ..........................................................................................................................................  Patient/Patient Representative Signature      ..........................................................................................................................................  Patient Representative Print Name and Relationship to Patient    ..................................................               ................................................  Date                                            Time    ..........................................................................................................................................  Reviewed by Signature/Title    ...................................................              ..............................................  Date                                                            Time

## 2018-02-13 NOTE — PROGRESS NOTES
Patient Information     Patient Name MRN Sex Philipp Garnica [id number] Male 1955      Progress Notes by Maritza Armenta RN at 2018  9:30 AM     Author:  Maritza Armenta RN Service:  (none) Author Type:  NURS- Registered Nurse     Filed:  2018 10:34 AM Date of Service:  2018  9:30 AM Status:  Signed     :  Maritza Armenta RN (NURS- Registered Nurse)            Paracentesis tolerated by patient. Pressure held on right abd procedural site for 3 minutes, then used liquidband for puncture closure, requested by the patient; followed by a sterile 2x2 gauze and tegaderm.   6,500ml clear dark cayden fluid removed.  Patient ambulated to dsu for IV.

## 2018-02-13 NOTE — MISCELLANEOUS
Patient Information     Patient Name MRN Sex Philipp Garnica [id number] Male 1955      Protocol by Layla Diaz RN at 2018  8:33 AM     Author:  Layla Diaz RN Service:  (none) Author Type:  NURS- Registered Nurse     Filed:  2018  8:36 AM Date of Service:  2018  8:33 AM Status:  Signed     :  Layla Diaz RN (NURS- Registered Nurse)            Universal Protocol    A. Pre-procedure verification complete yes  1-relevant information / documentation available, reviewed and properly matched to the patient; 2-consent accurate and complete, 3-equipment and supplies available    B. Site marking complete Yes  Site marked if not in continuous attendance with patient    C. TIME OUT completed yes  Time Out was conducted just prior to starting procedure to verify the eight required elements: 1-patient identity, 2-consent accurate and complete, 3-position, 4-correct side/site marked (if applicable), 5-procedure, 6-relevant images / results properly labeled and displayed (if applicable), 7-antibiotics / irrigation fluids (if applicable), 8-safety precautions.

## 2018-02-13 NOTE — PROGRESS NOTES
Patient Information     Patient Name MRN Sex Philipp Garnica [id number] Male 1955      Progress Notes by iVki Tidwell RN at 2018  2:15 PM     Author:  Viki Tidwell RN  Service:  (none) Author Type:  NURS- Registered Nurse     Filed:  2018  4:02 PM  Date of Service:  2018  2:15 PM Status:  Addendum     :  Viki Tidwell RN (NURS- Registered Nurse)        Related Notes: Original Note by Viki Tidwell RN (NURS- Registered Nurse) filed at 2018  2:19 PM            Pressure held on puncture site for 2 minutes.  Liquiband applied.  Sterile 2x2 placed over insertion site and covered with a sterile tegaderm.  Total of 7500 mls removed from abdomen.

## 2018-02-13 NOTE — PROGRESS NOTES
Patient Information     Patient Name MRN Sex Philipp Garnica [id number] Male 1955      Progress Notes by Layla Diaz RN at 2018  9:50 AM     Author:  Layla Diaz RN Service:  (none) Author Type:  NURS- Registered Nurse     Filed:  2018  4:01 PM Date of Service:  2018  9:50 AM Status:  Signed     :  Layla Diaz RN (NURS- Registered Nurse)            Paracentesis complete. Catheter removed, Pressure held to site for 10 minutes. Followed by glue closure, folded 2x2 and Tegaderm x2. Patient tolerated well, 7800 ml of orange fluid removed. Patient to DSU for Albumin infusion

## 2018-02-13 NOTE — PROGRESS NOTES
Pressure held on right lateral puncture site for 1 minutes. Skin adhesive applied, sterile 2x2 placed over insertion site and covered with a sterile tegaderm.

## 2018-02-13 NOTE — PROGRESS NOTES
Patient Information     Patient Name MRN Sex Philipp Garnica [id number] Male 1955      Progress Notes by Loyda Fishman RN at 2018 10:35 AM     Author:  Loyda Fishman RN Service:  (none) Author Type:  NURS- Registered Nurse     Filed:  2018 10:37 AM Date of Service:  2018 10:35 AM Status:  Signed     :  Loyda Fishman RN (NURS- Registered Nurse)            Discharge Note    Data:  Philipp Jefferson has been discharged home at 1035 via ambulatory accompanied by Registered Nurse.      Action:  Written discharge/follow-up instructions were provided to patient. Prescriptions : None.  Belongings sent with patient. Medications from home sent with patient/family: Not Applicable  Equipment none .     Response:  Patient verbalized understanding of discharge instructions, reason for discharge, and necessary follow-up appointments.

## 2018-02-13 NOTE — MISCELLANEOUS
Patient Information     Patient Name MRN Sex Philipp Garnica [id number] Male 1955      Protocol by Layla Diaz RN at 2018  8:29 AM     Author:  Layla Diaz RN Service:  (none) Author Type:  NURS- Registered Nurse     Filed:  2018  8:35 AM Date of Service:  2018  8:29 AM Status:  Signed     :  Layla Diaz RN (NURS- Registered Nurse)            Universal Protocol    A. Pre-procedure verification complete yes  1-relevant information / documentation available, reviewed and properly matched to the patient; 2-consent accurate and complete, 3-equipment and supplies available    B. Site marking complete Yes  Site marked if not in continuous attendance with patient    C. TIME OUT completed yes  Time Out was conducted just prior to starting procedure to verify the eight required elements: 1-patient identity, 2-consent accurate and complete, 3-position, 4-correct side/site marked (if applicable), 5-procedure, 6-relevant images / results properly labeled and displayed (if applicable), 7-antibiotics / irrigation fluids (if applicable), 8-safety precautions.

## 2018-02-13 NOTE — MISCELLANEOUS
Patient Information     Patient Name MRN Sex Philipp Garnica [id number] Male 1955      Protocol by Viki Tidwell RN at 2018  1:20 PM     Author:  Viki Tidwell RN Service:  (none) Author Type:  NURS- Registered Nurse     Filed:  2018  1:24 PM Date of Service:  2018  1:20 PM Status:  Signed     :  Viki Tidwell RN (NURS- Registered Nurse)            Universal Protocol    A. Pre-procedure verification complete yes  1-relevant information / documentation available, reviewed and properly matched to the patient; 2-consent accurate and complete, 3-equipment and supplies available    B. Site marking complete N/A  Site marked if not in continuous attendance with patient    C. TIME OUT completed yes  Time Out was conducted just prior to starting procedure to verify the eight required elements: 1-patient identity, 2-consent accurate and complete, 3-position, 4-correct side/site marked (if applicable), 5-procedure, 6-relevant images / results properly labeled and displayed (if applicable), 7-antibiotics / irrigation fluids (if applicable), 8-safety precautions.

## 2018-02-13 NOTE — PROGRESS NOTES
Patient Information     Patient Name MRN Sex     Philipp Jefferson [id number] Male 1955      Progress Notes by Layla Diaz RN at 2018 10:25 AM     Author:  Layla Diaz RN Service:  (none) Author Type:  NURS- Registered Nurse     Filed:  2018 10:33 AM Date of Service:  2018 10:25 AM Status:  Signed     :  Layla Diaz RN (NURS- Registered Nurse)            Discharge Note    Data:  Philipp Jefferson has been discharged home at 1025 via ambulatory accompanied by Registered Nurse.      Action:  Verbal discharge/follow-up instructions were provided to patient, patient refused written copy. Prescriptions : None.  Belongings sent with patient. Medications from home sent with patient/family: Not Applicable  Equipment none .     Response:  Patient verbalized understanding of discharge instructions, reason for discharge, and necessary follow-up appointments.

## 2018-02-13 NOTE — PROGRESS NOTES
Patient Information     Patient Name MRN Sex     Philipp Jefferson [id number] Male 1955      Progress Notes by Layla Diaz RN at 2018 11:20 AM     Author:  Layla Diaz RN Service:  (none) Author Type:  NURS- Registered Nurse     Filed:  2018  4:04 PM Date of Service:  2018 11:20 AM Status:  Signed     :  Layla Diaz RN (NURS- Registered Nurse)            Discharge Note    Data:  Philipp Jefferson has been discharged home at 1120 via ambulatory accompanied by Registered Nurse.      Action:   Discharge/follow-up instructions were provided to patient verbally. Patient refuses paper copy as he has it at home.  Prescriptions : None.  Belongings sent with patient. Medications from home sent with patient/family: Not Applicable  Equipment-None.     Response:  Patient verbalized understanding of discharge instructions, reason for discharge, and necessary follow-up appointments.

## 2018-02-13 NOTE — PROGRESS NOTES
Patient Information     Patient Name MRN Sex     Philipp Jefferson [id number] Male 1955      Progress Notes by Viki Tidwell RN at 2018  4:00 PM     Author:  Viki Tidwell RN Service:  (none) Author Type:  NURS- Registered Nurse     Filed:  2018  4:00 PM Date of Service:  2018  4:00 PM Status:  Signed     :  Viki Tidwell RN (NURS- Registered Nurse)            Discharge Note    Data:  Philipp Jefferson has been discharged home at 1540 via ambulatory accompanied by Registered Nurse.      Action:  Written discharge/follow-up instructions were not provided to patient-pt declines as has many copies at home. Prescriptions : None.  Belongings sent with patient. Medications from home sent with patient/family: Not Applicable  Equipment none .     Response:  Patient verbalized understanding of discharge instructions, reason for discharge, and necessary follow-up appointments.

## 2018-02-13 NOTE — PROGRESS NOTES
Patient Information     Patient Name MRN Sex Philipp Garnica [id number] Male 1955      Progress Notes by Loyda Fishman RN at 2018  9:30 AM     Author:  Loyda Fishman RN Service:  (none) Author Type:  NURS- Registered Nurse     Filed:  2018 10:36 AM Date of Service:  2018  9:30 AM Status:  Signed     :  Loyda Fishman RN (NURS- Registered Nurse)            Paracentesis done with 7700 ml fluid removed.  Skin adhesive, 2x2 and tegaderm applied.  To DSU for albumin.

## 2018-02-13 NOTE — PROGRESS NOTES
Patient Information     Patient Name MRN Sex Philipp Garnica [id number] Male 1955      Progress Notes by Viki Tidwell RN at 2018  1:15 PM     Author:  Viki Tidwell RN Service:  (none) Author Type:  NURS- Registered Nurse     Filed:  2018  1:23 PM Date of Service:  2018  1:15 PM Status:  Signed     :  Viki Tidwell RN (NURS- Registered Nurse)            Falls Risk Criteria:    Age 65 and older or under age 4        Sensory deficits    Poor vision    Use of ambulatory aides    Impaired judgment    Unable to walk independently    Meets High Risk criteria for falls:  No-some SOB with walking

## 2018-02-27 ENCOUNTER — HOSPITAL ENCOUNTER (OUTPATIENT)
Dept: ULTRASOUND IMAGING | Facility: OTHER | Age: 63
Discharge: HOME | End: 2018-02-27
Attending: INTERNAL MEDICINE | Admitting: INTERNAL MEDICINE
Payer: COMMERCIAL

## 2018-02-27 VITALS
DIASTOLIC BLOOD PRESSURE: 74 MMHG | RESPIRATION RATE: 18 BRPM | TEMPERATURE: 98.5 F | HEART RATE: 74 BPM | SYSTOLIC BLOOD PRESSURE: 128 MMHG | OXYGEN SATURATION: 100 %

## 2018-02-27 DIAGNOSIS — R18.8 CIRRHOSIS OF LIVER WITH ASCITES, UNSPECIFIED HEPATIC CIRRHOSIS TYPE (H): ICD-10-CM

## 2018-02-27 DIAGNOSIS — K74.60 CIRRHOSIS OF LIVER WITH ASCITES, UNSPECIFIED HEPATIC CIRRHOSIS TYPE (H): ICD-10-CM

## 2018-02-27 PROCEDURE — 25000125 ZZHC RX 250: Performed by: RADIOLOGY

## 2018-02-27 PROCEDURE — 25000128 H RX IP 250 OP 636: Performed by: INTERNAL MEDICINE

## 2018-02-27 PROCEDURE — P9047 ALBUMIN (HUMAN), 25%, 50ML: HCPCS | Performed by: INTERNAL MEDICINE

## 2018-02-27 PROCEDURE — 27210238 US PARACENTESIS

## 2018-02-27 PROCEDURE — 27210238 ZZH NEEDLE CATHETER

## 2018-02-27 RX ORDER — ALBUMIN (HUMAN) 12.5 G/50ML
25 SOLUTION INTRAVENOUS ONCE
Status: COMPLETED | OUTPATIENT
Start: 2018-02-27 | End: 2018-02-27

## 2018-02-27 RX ADMIN — LIDOCAINE HYDROCHLORIDE 10 ML: 10 INJECTION, SOLUTION INFILTRATION; PERINEURAL at 09:05

## 2018-02-27 RX ADMIN — ALBUMIN (HUMAN) 25 G: 0.25 INJECTION, SOLUTION INTRAVENOUS at 10:40

## 2018-02-27 NOTE — DISCHARGE INSTRUCTIONS
ULTRASOUND GUIDED PARACENTESIS    Ultrasound guided paracentesis is a procedure which involves the insertion of a needle through the wall of the abdomen to remove fluid. This is done to find out the cause of fluid in the abdomen and/or to relieve the symptoms caused by it. Because this requires the insertion of a needle into the abdomen, there is a small risk of bleeding and infection.     Activity: Rest the remainder of the day. You may resume normal activity the next day. Avoid any vigorous physical activity for 24 hours.    Comfort: If you have any discomfort or tenderness at the site you may take your usual or recommended pain medication. Do not take aspirin the day of the procedure.    Diet: You may resume your usual diet.    Care of site: Keep the bandage on for 24 hours. Then you may remove the bandage and shower. You may put on a clean band-aid or leave open to the air.    RETURN TO THE EMERGENCY ROOM FOR:    Severe abdominal pain or fullness   Light-headedness or dizziness (especially when upright or standing)   Chest pain/tightness or shortness of breath    CALL YOUR DOCTOR FOR:    A fever over 101 degrees   Increased redness, increased swelling, and/or persistent drainage/discomfort around the site    For questions, problems or concerns, contact the Radiology Department at [phone].

## 2018-02-27 NOTE — PROGRESS NOTES
Paracentesis completed.   4,100 ml of orange fluid removed from left abd procedural site.   Pressure held on site for 3 minutes, thin tip liquidband skin adhesive used at the site, covered with sterile 2x2 and tegaderm.  Patient ambulated to DSU for IV.

## 2018-02-27 NOTE — OR NURSING
Prior to the start of the procedure and with procedural staff participation, I verbally confirmed the patient s identity using two indicators, relevant allergies, that the procedure was appropriate and matched the consent or emergent situation, and that the correct equipment/implants were available. Immediately prior to starting the procedure I conducted the Time Out with the procedural staff and re-confirmed the patient s name, procedure, and site/side. (The Joint Commission universal protocol was followed.)  Yes    Sedation (Moderate or Deep): None

## 2018-03-06 ENCOUNTER — HOSPITAL ENCOUNTER (OUTPATIENT)
Dept: ULTRASOUND IMAGING | Facility: OTHER | Age: 63
Discharge: HOME | End: 2018-03-06
Attending: INTERNAL MEDICINE | Admitting: INTERNAL MEDICINE
Payer: COMMERCIAL

## 2018-03-06 ENCOUNTER — TELEPHONE (OUTPATIENT)
Dept: INTERNAL MEDICINE | Facility: OTHER | Age: 63
End: 2018-03-06

## 2018-03-06 VITALS
HEART RATE: 85 BPM | DIASTOLIC BLOOD PRESSURE: 77 MMHG | OXYGEN SATURATION: 100 % | TEMPERATURE: 98.7 F | RESPIRATION RATE: 20 BRPM | SYSTOLIC BLOOD PRESSURE: 133 MMHG

## 2018-03-06 DIAGNOSIS — K74.60 CIRRHOSIS OF LIVER WITH ASCITES, UNSPECIFIED HEPATIC CIRRHOSIS TYPE (H): ICD-10-CM

## 2018-03-06 DIAGNOSIS — B18.2 CHRONIC HEPATITIS C WITH HEPATIC COMA (H): Primary | ICD-10-CM

## 2018-03-06 DIAGNOSIS — K70.30 ALCOHOLIC CIRRHOSIS OF LIVER (H): ICD-10-CM

## 2018-03-06 DIAGNOSIS — R18.8 CIRRHOSIS OF LIVER WITH ASCITES, UNSPECIFIED HEPATIC CIRRHOSIS TYPE (H): ICD-10-CM

## 2018-03-06 LAB
ALBUMIN SERPL-MCNC: 3 G/DL (ref 3.5–5.7)
ALP SERPL-CCNC: 124 U/L (ref 34–104)
ALT SERPL W P-5'-P-CCNC: 15 U/L (ref 7–52)
AST SERPL W P-5'-P-CCNC: 29 U/L (ref 13–39)
BASOPHILS # BLD AUTO: 0 10E9/L (ref 0–0.2)
BASOPHILS NFR BLD AUTO: 1 %
BILIRUB SERPL-MCNC: 1.9 MG/DL (ref 0.3–1)
CREAT SERPL-MCNC: 1.45 MG/DL (ref 0.7–1.3)
DIFFERENTIAL METHOD BLD: ABNORMAL
EOSINOPHIL # BLD AUTO: 0.1 10E9/L (ref 0–0.7)
EOSINOPHIL NFR BLD AUTO: 4.3 %
ERYTHROCYTE [DISTWIDTH] IN BLOOD BY AUTOMATED COUNT: 19.3 % (ref 10–15)
GFR SERPL CREATININE-BSD FRML MDRD: 50 ML/MIN/1.7M2
HCT VFR BLD AUTO: 29.4 % (ref 40–53)
HGB BLD-MCNC: 9.8 G/DL (ref 13.3–17.7)
IMM GRANULOCYTES # BLD: 0 10E9/L (ref 0–0.4)
IMM GRANULOCYTES NFR BLD: 0.7 %
INR PPP: 1.67 (ref 0–1.3)
LYMPHOCYTES # BLD AUTO: 0.4 10E9/L (ref 0.8–5.3)
LYMPHOCYTES NFR BLD AUTO: 12.8 %
MCH RBC QN AUTO: 31.3 PG (ref 26.5–33)
MCHC RBC AUTO-ENTMCNC: 33.3 G/DL (ref 31.5–36.5)
MCV RBC AUTO: 94 FL (ref 78–100)
MONOCYTES # BLD AUTO: 0.4 10E9/L (ref 0–1.3)
MONOCYTES NFR BLD AUTO: 14.1 %
NEUTROPHILS # BLD AUTO: 2.1 10E9/L (ref 1.6–8.3)
NEUTROPHILS NFR BLD AUTO: 67.1 %
PLATELET # BLD AUTO: 38 10E9/L (ref 150–450)
POTASSIUM SERPL-SCNC: 3.6 MMOL/L (ref 3.5–5.1)
RBC # BLD AUTO: 3.13 10E12/L (ref 4.4–5.9)
SODIUM SERPL-SCNC: 138 MMOL/L (ref 134–144)
WBC # BLD AUTO: 3.1 10E9/L (ref 4–11)

## 2018-03-06 PROCEDURE — 36415 COLL VENOUS BLD VENIPUNCTURE: CPT

## 2018-03-06 PROCEDURE — 82247 BILIRUBIN TOTAL: CPT

## 2018-03-06 PROCEDURE — 84450 TRANSFERASE (AST) (SGOT): CPT

## 2018-03-06 PROCEDURE — 85025 COMPLETE CBC W/AUTO DIFF WBC: CPT

## 2018-03-06 PROCEDURE — 82040 ASSAY OF SERUM ALBUMIN: CPT

## 2018-03-06 PROCEDURE — 84132 ASSAY OF SERUM POTASSIUM: CPT

## 2018-03-06 PROCEDURE — 82565 ASSAY OF CREATININE: CPT

## 2018-03-06 PROCEDURE — 76705 ECHO EXAM OF ABDOMEN: CPT

## 2018-03-06 PROCEDURE — 84075 ASSAY ALKALINE PHOSPHATASE: CPT

## 2018-03-06 PROCEDURE — 85610 PROTHROMBIN TIME: CPT

## 2018-03-06 PROCEDURE — 84295 ASSAY OF SERUM SODIUM: CPT

## 2018-03-06 PROCEDURE — 84460 ALANINE AMINO (ALT) (SGPT): CPT

## 2018-03-06 NOTE — TELEPHONE ENCOUNTER
Returned call to patient and verified last name and date of birth. Patient was unable to have paracentesis done today, since his INR was not within limits. He is concerned, and wants to know, what to do now? He wanted to let you know, his Vitamin K is now covered, and he just needs to pick it up.  Please advise    Brianne Hayden LPN on 3/6/2018 at 11:20 AM

## 2018-03-06 NOTE — PROGRESS NOTES
Paracentesis not done due to contraindication with INR being 1.67 today per Dr Velasquez.  Pt advised to call PMD and attempt to reschedule for later this week.

## 2018-03-06 NOTE — TELEPHONE ENCOUNTER
FYI:  Patient notified of providers note.  Patient has Vitamin K on medication list but has not been able to get insurance to cover it.  Patient reports that the insurance company with cover it today and will restart it today.    Shruthi Arevalo LPN 3/6/2018 2:23 PM

## 2018-03-06 NOTE — TELEPHONE ENCOUNTER
Noted.  Have ultrasound reschedule his paracentesis.  Take vitamin K tablet 24 hours before his paracentesis.  Otherwise, could recommend that he eats green leafy vegetables or dark green vegetables for 2 or 3 days prior to getting his paracentesis.    Ramon Vinson

## 2018-03-07 ENCOUNTER — HOSPITAL ENCOUNTER (OUTPATIENT)
Dept: ULTRASOUND IMAGING | Facility: OTHER | Age: 63
Discharge: HOME | End: 2018-03-07
Attending: INTERNAL MEDICINE | Admitting: INTERNAL MEDICINE
Payer: COMMERCIAL

## 2018-03-07 VITALS
RESPIRATION RATE: 20 BRPM | TEMPERATURE: 98.5 F | DIASTOLIC BLOOD PRESSURE: 74 MMHG | SYSTOLIC BLOOD PRESSURE: 135 MMHG | OXYGEN SATURATION: 100 % | HEART RATE: 78 BPM

## 2018-03-07 DIAGNOSIS — C22.0 CANCER, HEPATOCELLULAR (H): ICD-10-CM

## 2018-03-07 DIAGNOSIS — B18.2 CHRONIC HEPATITIS C (H): ICD-10-CM

## 2018-03-07 DIAGNOSIS — K70.30 ALCOHOLIC CIRRHOSIS OF LIVER (H): ICD-10-CM

## 2018-03-07 DIAGNOSIS — K74.60 CIRRHOSIS OF LIVER WITH ASCITES, UNSPECIFIED HEPATIC CIRRHOSIS TYPE (H): ICD-10-CM

## 2018-03-07 DIAGNOSIS — R18.8 CIRRHOSIS OF LIVER WITH ASCITES, UNSPECIFIED HEPATIC CIRRHOSIS TYPE (H): ICD-10-CM

## 2018-03-07 DIAGNOSIS — R18.8 OTHER ASCITES: Primary | ICD-10-CM

## 2018-03-07 LAB — INR PPP: 1.75 (ref 0–1.3)

## 2018-03-07 PROCEDURE — 25000128 H RX IP 250 OP 636: Performed by: INTERNAL MEDICINE

## 2018-03-07 PROCEDURE — 25000125 ZZHC RX 250: Performed by: RADIOLOGY

## 2018-03-07 PROCEDURE — 36415 COLL VENOUS BLD VENIPUNCTURE: CPT | Performed by: INTERNAL MEDICINE

## 2018-03-07 PROCEDURE — 85610 PROTHROMBIN TIME: CPT | Performed by: INTERNAL MEDICINE

## 2018-03-07 PROCEDURE — 27210238 US PARACENTESIS

## 2018-03-07 PROCEDURE — P9047 ALBUMIN (HUMAN), 25%, 50ML: HCPCS | Performed by: INTERNAL MEDICINE

## 2018-03-07 RX ORDER — ALBUMIN (HUMAN) 12.5 G/50ML
75 SOLUTION INTRAVENOUS ONCE
Status: COMPLETED | OUTPATIENT
Start: 2018-03-07 | End: 2018-03-07

## 2018-03-07 RX ADMIN — LIDOCAINE HYDROCHLORIDE 10 ML: 10 INJECTION, SOLUTION INFILTRATION; PERINEURAL at 13:20

## 2018-03-07 RX ADMIN — ALBUMIN (HUMAN) 75 G: 0.25 INJECTION, SOLUTION INTRAVENOUS at 15:00

## 2018-03-07 NOTE — PROGRESS NOTES
Pressure held on RLQ puncture site for 1 minutes. Skin adhesive and sterile 2x2 placed over insertion site and covered with a sterile tegaderm. 48819 mLs removed.

## 2018-03-07 NOTE — IP AVS SNAPSHOT
MRN:[id number]                      After Visit Summary   3/7/2018    Philipp Jefferson    MRN: [id number]           Visit Information        Provider Department      3/7/2018  1:00 PM MOLINA2; NEHEMIAH IMAGING NURSE; [facility]           Review of your medicines      UNREVIEWED medicines. Ask your doctor about these medicines        Dose / Directions    aMILoride 5 MG tablet   Commonly known as:  MIDAMOR   Indication:  Abnormal Accumulation of Clear Abdominal Fluid        Dose:  40 mg   Take 40 mg by mouth daily   Refills:  0       ciprofloxacin 500 MG tablet   Commonly known as:  CIPRO        Dose:  1 tablet   Take 1 tablet by mouth daily   Refills:  0       D 5000 5000 UNITS Tabs   Generic drug:  Cholecalciferol        Dose:  37323 Units   Take 50,000 Units by mouth once a week For 12 weeks.   Refills:  0       furosemide 40 MG tablet   Commonly known as:  LASIX        Dose:  4 tablet   Take 4 tablets by mouth daily Until seen in clinic   Refills:  0       phytonadione 5 MG tablet   Commonly known as:  MEPHYTON        Dose:  1 tablet   Take 1 tablet by mouth daily   Refills:  0       propranolol 40 MG tablet   Commonly known as:  INDERAL        Dose:  40 mg   Take 40 mg by mouth 2 times daily   Refills:  0       rifaximin 550 MG Tabs tablet   Commonly known as:  XIFAXAN        Dose:  550 mg   Take 550 mg by mouth 2 times daily   Refills:  0       vitamin A 67376 UNIT capsule        Dose:  76637 Units   Take 10,000 Units by mouth daily   Refills:  0                Protect others around you: Learn how to safely use, store and throw away your medicines at www.disposemymeds.org.         Follow-ups after your visit        Your next 10 appointments already scheduled     Mar 13, 2018  8:15 AM CDT   (Arrive by 8:00 AM)   US PARACENTESIS with CHRISTIN1,  IMAGING NURSE, NEHEMIAHRAD1   [facility] [place] [facility] ([facility])    [address]  [place]  [address] [phone]           Bring a list of your medicines to the exam. Include vitamins, minerals and over-the-counter drugs.  Tell your doctor in advance:   If you are or may be pregnant.   If you are taking Coumadin (or any other blood thinners) 5 days prior to the exam for any special instructions.  IF YOUR DOCTOR HAS TOLD YOU THAT YOU WILL BE RECEIVING SEDATION (medicine to help you relax): (Typically sedation is only for liver exams at [facility] and Renal Biopsy exams in Pediatrics)   See your family doctor for an exam within 30 days of treatment.   Plan for an adult to drive you home and stay with you for at least 24 hours.   No eating or drinking for 4 hours before your test. You may take medicine with small sips of water.   If you have diabetes:If you take insulin, call your diabetes care team for any special instructions for this exam.  Please call the Imaging Department at your exam site with any questions.             Mar 20, 2018  8:15 AM CDT   (Arrive by 8:00 AM)   US PARACENTESIS with GHUS1,  IMAGING NURSE, GHRAD1   [facility] ([facility])    [address]  [facility] [id number] [phone]           Bring a list of your medicines to the exam. Include vitamins, minerals and over-the-counter drugs.  Tell your doctor in advance:   If you are or may be pregnant.   If you are taking Coumadin (or any other blood thinners) 5 days prior to the exam for any special instructions.  IF YOUR DOCTOR HAS TOLD YOU THAT YOU WILL BE RECEIVING SEDATION (medicine to help you relax): (Typically sedation is only for liver exams at [facility] and Renal Biopsy exams in Pediatrics)   See your family doctor for an exam within 30 days of treatment.   Plan for an adult to drive you home and stay with you for at least 24 hours.   No eating or drinking for 4 hours before your test. You may take medicine with small sips of water.   If you  have diabetes:If you take insulin, call your diabetes care team for any special instructions for this exam.  Please call the Imaging Department at your exam site with any questions.             Mar 27, 2018  8:15 AM CDT   (Arrive by 8:00 AM)   US PARACENTESIS with NEHEMIAHUS1, GH IMAGING NURSE, GHRAD1   [facility] ([facility])    [address]  [place] [facility] [address]   [phone]           Bring a list of your medicines to the exam. Include vitamins, minerals and over-the-counter drugs.  Tell your doctor in advance:   If you are or may be pregnant.   If you are taking Coumadin (or any other blood thinners) 5 days prior to the exam for any special instructions.  IF YOUR DOCTOR HAS TOLD YOU THAT YOU WILL BE RECEIVING SEDATION (medicine to help you relax): (Typically sedation is only for liver exams at [facility] and Renal Biopsy exams in Pediatrics)   See your family doctor for an exam within 30 days of treatment.   Plan for an adult to drive you home and stay with you for at least 24 hours.   No eating or drinking for 4 hours before your test. You may take medicine with small sips of water.   If you have diabetes:If you take insulin, call your diabetes care team for any special instructions for this exam.  Please call the Imaging Department at your exam site with any questions.             Apr 03, 2018  8:15 AM CDT   (Arrive by 8:00 AM)   US PARACENTESIS with GHUS1   [facility] ([facility])    [address]  [place] [address]   [phone]           Bring a list of your medicines to the exam. Include vitamins, minerals and over-the-counter drugs.  Tell your doctor in advance:   If you are or may be pregnant.   If you are taking Coumadin (or any other blood thinners) 5 days prior to the exam for any special instructions.  IF YOUR DOCTOR HAS TOLD YOU THAT YOU WILL BE RECEIVING SEDATION (medicine to  help you relax): (Typically sedation is only for liver exams at [facility] and Renal Biopsy exams in Pediatrics)   See your family doctor for an exam within 30 days of treatment.   Plan for an adult to drive you home and stay with you for at least 24 hours.   No eating or drinking for 4 hours before your test. You may take medicine with small sips of water.   If you have diabetes:If you take insulin, call your diabetes care team for any special instructions for this exam.  Please call the Imaging Department at your exam site with any questions.                Care Instructions        Further instructions from your care team       ULTRASOUND GUIDED PARACENTESIS    Ultrasound guided paracentesis is a procedure which involves the insertion of a needle through the wall of the abdomen to remove fluid. This is done to find out the cause of fluid in the abdomen and/or to relieve the symptoms caused by it. Because this requires the insertion of a needle into the abdomen, there is a small risk of bleeding and infection.     Activity: Rest the remainder of the day. You may resume normal activity the next day. Avoid any vigorous physical activity for 24 hours.    Comfort: If you have any discomfort or tenderness at the site you may take your usual or recommended pain medication. Do not take aspirin the day of the procedure.    Diet: You may resume your usual diet.    Care of site: Keep the bandage on for 24 hours. Then you may remove the bandage and shower. You may put on a clean band-aid or leave open to the air.    RETURN TO THE EMERGENCY ROOM FOR:    Severe abdominal pain or fullness   Light-headedness or dizziness (especially when upright or standing)   Chest pain/tightness or shortness of breath    CALL YOUR DOCTOR FOR:    A fever over 101 degrees   Increased redness, increased swelling, and/or persistent drainage/discomfort around the site    For questions, problems or concerns, contact the Radiology  "Department at [phone].          Additional Information About Your Visit        [employer] Information     [facility] lets you send messages to your doctor, view your test results, renew your prescriptions, schedule appointments and more. To sign up, go to [email or web address] . Click on \"Log in\" on the left side of the screen, which will take you to the Welcome page. Then click on \"Sign up Now\" on the right side of the page.     You will be asked to enter the access code listed below, as well as some personal information. Please follow the directions to create your username and password.     Your access code is: [email or web address]  Expires: 2018  3:30 PM     Your access code will  in 90 days. If you need help or a new code, please call your [facility] clinic or [phone].        Care EveryWhere ID     This is your Care EveryWhere ID. This could be used by other organizations to access your [facility] medical records  [id number]        Your Vitals Were     Blood Pressure Pulse Temperature Respirations Pulse Oximetry       135/74 (BP Location: Right arm) 78 98.5  F (36.9  C) 20 100%        Primary Care Provider Office Phone # Fax #    [facility] [phone]      Equal Access to Services     JULIANNA BROTHERS : Hadii yeny ku hadasho Soomaali, waaxda luqadaha, qaybta kaalmada adeegyada, skylar sheehan . So [facility] [phone].    ATENCIÓN: Si habla español, tiene a polk disposición servicios gratuitos de asistencia lingüística. Llame al [phone].    We comply with applicable federal civil rights laws and [place] laws. We do not discriminate on the basis of race, color, national origin, age, disability, sex, sexual orientation, or gender identity.            Thank you!     Thank you for choosing [facility] for your care. Our goal is always to provide you with excellent care. Hearing back from our patients is one way we can continue to improve our services. Please take a " few minutes to complete the written survey that you may receive in the mail after you visit with us. Thank you!             Medication List: This is a list of all your medications and when to take them. Check marks below indicate your daily home schedule. Keep this list as a reference.      Medications           Morning Afternoon Evening Bedtime As Needed    aMILoride 5 MG tablet   Commonly known as:  MIDAMOR   Take 40 mg by mouth daily                                ciprofloxacin 500 MG tablet   Commonly known as:  CIPRO   Take 1 tablet by mouth daily                                D 5000 5000 UNITS Tabs   Take 50,000 Units by mouth once a week For 12 weeks.   Generic drug:  Cholecalciferol                                furosemide 40 MG tablet   Commonly known as:  LASIX   Take 4 tablets by mouth daily Until seen in clinic                                phytonadione 5 MG tablet   Commonly known as:  MEPHYTON   Take 1 tablet by mouth daily                                propranolol 40 MG tablet   Commonly known as:  INDERAL   Take 40 mg by mouth 2 times daily                                rifaximin 550 MG Tabs tablet   Commonly known as:  XIFAXAN   Take 550 mg by mouth 2 times daily                                vitamin A 95299 UNIT capsule   Take 10,000 Units by mouth daily

## 2018-03-07 NOTE — PROGRESS NOTES
Patient discharged at this time, Tolerated infusion well, puncture site observed, no drainage.  Patient instructed to remove dressings tomorrow. Patient comfortable, steady on feet, Voices understanding of discharge instructions given.

## 2018-03-07 NOTE — IP AVS SNAPSHOT
[facility]    [address]    [place] [address]    Phone:  [phone]    Fax:  [phone]                                       After Visit Summary   3/7/2018    Philipp Jefferson    MRN: [id number]           After Visit Summary Signature Page     I have received my discharge instructions, and my questions have been answered. I have discussed any challenges I see with this plan with the nurse or doctor.    ..........................................................................................................................................  Patient/Patient Representative Signature      ..........................................................................................................................................  Patient Representative Print Name and Relationship to Patient    ..................................................               ................................................  Date                                            Time    ..........................................................................................................................................  Reviewed by Signature/Title    ...................................................              ..............................................  Date                                                            Time

## 2018-03-07 NOTE — DISCHARGE INSTRUCTIONS
ULTRASOUND GUIDED PARACENTESIS    Ultrasound guided paracentesis is a procedure which involves the insertion of a needle through the wall of the abdomen to remove fluid. This is done to find out the cause of fluid in the abdomen and/or to relieve the symptoms caused by it. Because this requires the insertion of a needle into the abdomen, there is a small risk of bleeding and infection.     Activity: Rest the remainder of the day. You may resume normal activity the next day. Avoid any vigorous physical activity for 24 hours.    Comfort: If you have any discomfort or tenderness at the site you may take your usual or recommended pain medication. Do not take aspirin the day of the procedure.    Diet: You may resume your usual diet.    Care of site: Keep the bandage on for 24 hours. Then you may remove the bandage and shower. You may put on a clean band-aid or leave open to the air.    RETURN TO THE EMERGENCY ROOM FOR:    Severe abdominal pain or fullness   Light-headedness or dizziness (especially when upright or standing)   Chest pain/tightness or shortness of breath    CALL YOUR DOCTOR FOR:    A fever over 101 degrees   Increased redness, increased swelling, and/or persistent drainage/discomfort around the site    For questions, problems or concerns, contact the Radiology Department at [phone].

## 2018-03-08 ENCOUNTER — DOCUMENTATION ONLY (OUTPATIENT)
Dept: FAMILY MEDICINE | Facility: OTHER | Age: 63
End: 2018-03-08

## 2018-03-08 RX ORDER — ZINC SULFATE 50(220)MG
220 CAPSULE ORAL DAILY
COMMUNITY
Start: 2012-12-12 | End: 2018-07-21

## 2018-03-08 RX ORDER — LACTULOSE 10 G/15ML
5 SOLUTION ORAL DAILY
COMMUNITY
Start: 2014-10-15 | End: 2018-07-21

## 2018-03-08 RX ORDER — PRAMIPEXOLE DIHYDROCHLORIDE 0.25 MG/1
1 TABLET ORAL DAILY
COMMUNITY
Start: 2014-10-20 | End: 2019-08-29

## 2018-03-08 RX ORDER — VITAMIN E 268 MG
400 CAPSULE ORAL DAILY
COMMUNITY
Start: 2014-03-28 | End: 2018-07-21

## 2018-03-08 RX ORDER — GABAPENTIN 300 MG/1
300 CAPSULE ORAL 3 TIMES DAILY
COMMUNITY
Start: 2015-10-02 | End: 2022-03-02

## 2018-03-08 RX ORDER — POTASSIUM CHLORIDE 750 MG/1
10 CAPSULE, EXTENDED RELEASE ORAL DAILY
COMMUNITY
Start: 2017-04-19 | End: 2018-05-01

## 2018-03-08 RX ORDER — PRAMIPEXOLE DIHYDROCHLORIDE 0.12 MG/1
1 TABLET ORAL DAILY
COMMUNITY
Start: 2017-09-17 | End: 2018-05-08

## 2018-03-13 ENCOUNTER — HOSPITAL ENCOUNTER (OUTPATIENT)
Dept: ULTRASOUND IMAGING | Facility: OTHER | Age: 63
Discharge: HOME | End: 2018-03-13
Attending: INTERNAL MEDICINE | Admitting: INTERNAL MEDICINE
Payer: COMMERCIAL

## 2018-03-13 VITALS
HEART RATE: 74 BPM | SYSTOLIC BLOOD PRESSURE: 135 MMHG | RESPIRATION RATE: 20 BRPM | OXYGEN SATURATION: 99 % | TEMPERATURE: 97.9 F | DIASTOLIC BLOOD PRESSURE: 77 MMHG

## 2018-03-13 DIAGNOSIS — K74.60 CIRRHOSIS OF LIVER WITH ASCITES, UNSPECIFIED HEPATIC CIRRHOSIS TYPE (H): ICD-10-CM

## 2018-03-13 DIAGNOSIS — R18.8 CIRRHOSIS OF LIVER WITH ASCITES, UNSPECIFIED HEPATIC CIRRHOSIS TYPE (H): ICD-10-CM

## 2018-03-13 LAB — INR PPP: 1.67 (ref 0–1.3)

## 2018-03-13 PROCEDURE — 25000128 H RX IP 250 OP 636: Performed by: INTERNAL MEDICINE

## 2018-03-13 PROCEDURE — 25000125 ZZHC RX 250: Performed by: RADIOLOGY

## 2018-03-13 PROCEDURE — 27210238 US PARACENTESIS

## 2018-03-13 PROCEDURE — 36415 COLL VENOUS BLD VENIPUNCTURE: CPT | Performed by: RADIOLOGY

## 2018-03-13 PROCEDURE — P9047 ALBUMIN (HUMAN), 25%, 50ML: HCPCS | Performed by: INTERNAL MEDICINE

## 2018-03-13 PROCEDURE — 85610 PROTHROMBIN TIME: CPT | Performed by: RADIOLOGY

## 2018-03-13 PROCEDURE — 27210238 ZZH NEEDLE CATHETER

## 2018-03-13 RX ORDER — ALBUMIN (HUMAN) 12.5 G/50ML
50 SOLUTION INTRAVENOUS ONCE
Status: COMPLETED | OUTPATIENT
Start: 2018-03-13 | End: 2018-03-13

## 2018-03-13 RX ADMIN — ALBUMIN (HUMAN) 50 G: 0.25 INJECTION, SOLUTION INTRAVENOUS at 10:07

## 2018-03-13 RX ADMIN — LIDOCAINE HYDROCHLORIDE 10 ML: 10 INJECTION, SOLUTION INFILTRATION; PERINEURAL at 08:30

## 2018-03-13 NOTE — PROGRESS NOTES
Pressure held on Right mid  Abdomen puncture site for 2 minutes. Skin adhesive and sterile 2x2 placed over insertion site and covered with a sterile tegaderm. Puncture site from last week also re glued as it had been leaking since yesterday. INR was ordered due to red color removed from abdomen and seemed to take longer than usual to clot.

## 2018-03-20 ENCOUNTER — HOSPITAL ENCOUNTER (OUTPATIENT)
Dept: ULTRASOUND IMAGING | Facility: OTHER | Age: 63
End: 2018-03-20
Attending: INTERNAL MEDICINE
Payer: COMMERCIAL

## 2018-03-20 VITALS
RESPIRATION RATE: 18 BRPM | DIASTOLIC BLOOD PRESSURE: 67 MMHG | SYSTOLIC BLOOD PRESSURE: 115 MMHG | OXYGEN SATURATION: 100 % | TEMPERATURE: 97.1 F | HEART RATE: 73 BPM

## 2018-03-20 DIAGNOSIS — K74.60 CIRRHOSIS OF LIVER WITH ASCITES, UNSPECIFIED HEPATIC CIRRHOSIS TYPE (H): ICD-10-CM

## 2018-03-20 DIAGNOSIS — R18.8 CIRRHOSIS OF LIVER WITH ASCITES, UNSPECIFIED HEPATIC CIRRHOSIS TYPE (H): ICD-10-CM

## 2018-03-20 PROCEDURE — 27210238 US PARACENTESIS

## 2018-03-20 PROCEDURE — P9047 ALBUMIN (HUMAN), 25%, 50ML: HCPCS | Performed by: INTERNAL MEDICINE

## 2018-03-20 PROCEDURE — 25000125 ZZHC RX 250: Performed by: RADIOLOGY

## 2018-03-20 PROCEDURE — 25000128 H RX IP 250 OP 636: Performed by: INTERNAL MEDICINE

## 2018-03-20 RX ORDER — ALBUMIN (HUMAN) 12.5 G/50ML
50 SOLUTION INTRAVENOUS ONCE
Status: COMPLETED | OUTPATIENT
Start: 2018-03-20 | End: 2018-03-20

## 2018-03-20 RX ADMIN — ALBUMIN (HUMAN) 50 G: 0.25 INJECTION, SOLUTION INTRAVENOUS at 10:00

## 2018-03-20 RX ADMIN — LIDOCAINE HYDROCHLORIDE 10 ML: 10 INJECTION, SOLUTION INFILTRATION; PERINEURAL at 08:45

## 2018-03-20 NOTE — PROGRESS NOTES
Pressure held on right mid abdomen puncture site for 1 minute. Skin adhesive, sterile 2x2 placed over insertion site and covered with a sterile tegaderm.

## 2018-03-27 ENCOUNTER — HOSPITAL ENCOUNTER (OUTPATIENT)
Dept: ULTRASOUND IMAGING | Facility: OTHER | Age: 63
End: 2018-03-27
Attending: INTERNAL MEDICINE
Payer: COMMERCIAL

## 2018-03-27 VITALS
OXYGEN SATURATION: 98 % | SYSTOLIC BLOOD PRESSURE: 122 MMHG | TEMPERATURE: 97.6 F | HEART RATE: 84 BPM | DIASTOLIC BLOOD PRESSURE: 66 MMHG | RESPIRATION RATE: 20 BRPM

## 2018-03-27 DIAGNOSIS — K74.60 CIRRHOSIS OF LIVER WITH ASCITES, UNSPECIFIED HEPATIC CIRRHOSIS TYPE (H): ICD-10-CM

## 2018-03-27 DIAGNOSIS — R18.8 CIRRHOSIS OF LIVER WITH ASCITES, UNSPECIFIED HEPATIC CIRRHOSIS TYPE (H): ICD-10-CM

## 2018-03-27 PROCEDURE — 25000125 ZZHC RX 250: Performed by: RADIOLOGY

## 2018-03-27 PROCEDURE — 25000128 H RX IP 250 OP 636: Performed by: INTERNAL MEDICINE

## 2018-03-27 PROCEDURE — P9047 ALBUMIN (HUMAN), 25%, 50ML: HCPCS | Performed by: INTERNAL MEDICINE

## 2018-03-27 PROCEDURE — 27210238 US PARACENTESIS

## 2018-03-27 RX ORDER — ALBUMIN (HUMAN) 12.5 G/50ML
37.5 SOLUTION INTRAVENOUS ONCE
Status: COMPLETED | OUTPATIENT
Start: 2018-03-27 | End: 2018-03-27

## 2018-03-27 RX ADMIN — LIDOCAINE HYDROCHLORIDE 10 ML: 10 INJECTION, SOLUTION INFILTRATION; PERINEURAL at 09:45

## 2018-03-27 RX ADMIN — ALBUMIN (HUMAN) 37.5 G: 0.25 INJECTION, SOLUTION INTRAVENOUS at 09:56

## 2018-03-27 NOTE — PROGRESS NOTES
Paracentesis done with 5000 ml pink-tinged clear fluid removed- pt states color is normal for him and denies abd pain, fevers or feeling ill.  Skin adhesive, 2x2 and tegaderm applied.  To DSU for albumin.

## 2018-03-28 RX ORDER — ALBUMIN (HUMAN) 12.5 G/50ML
25 SOLUTION INTRAVENOUS DAILY PRN
Status: CANCELLED
Start: 2018-03-28

## 2018-04-03 ENCOUNTER — HOSPITAL ENCOUNTER (OUTPATIENT)
Dept: ULTRASOUND IMAGING | Facility: OTHER | Age: 63
Discharge: HOME | End: 2018-04-03
Attending: INTERNAL MEDICINE | Admitting: INTERNAL MEDICINE
Payer: COMMERCIAL

## 2018-04-03 VITALS
OXYGEN SATURATION: 100 % | SYSTOLIC BLOOD PRESSURE: 133 MMHG | HEART RATE: 79 BPM | RESPIRATION RATE: 20 BRPM | DIASTOLIC BLOOD PRESSURE: 74 MMHG | TEMPERATURE: 97.6 F

## 2018-04-03 DIAGNOSIS — K74.60 CIRRHOSIS OF LIVER WITH ASCITES, UNSPECIFIED HEPATIC CIRRHOSIS TYPE (H): ICD-10-CM

## 2018-04-03 DIAGNOSIS — R18.8 CIRRHOSIS OF LIVER WITH ASCITES, UNSPECIFIED HEPATIC CIRRHOSIS TYPE (H): ICD-10-CM

## 2018-04-03 PROCEDURE — 25000128 H RX IP 250 OP 636: Performed by: INTERNAL MEDICINE

## 2018-04-03 PROCEDURE — 27210238 US PARACENTESIS

## 2018-04-03 PROCEDURE — 25000125 ZZHC RX 250: Performed by: RADIOLOGY

## 2018-04-03 PROCEDURE — P9047 ALBUMIN (HUMAN), 25%, 50ML: HCPCS | Performed by: INTERNAL MEDICINE

## 2018-04-03 RX ORDER — ALBUMIN (HUMAN) 12.5 G/50ML
75 SOLUTION INTRAVENOUS ONCE
Status: COMPLETED | OUTPATIENT
Start: 2018-04-03 | End: 2018-04-03

## 2018-04-03 RX ADMIN — LIDOCAINE HYDROCHLORIDE 10 ML: 10 INJECTION, SOLUTION INFILTRATION; PERINEURAL at 08:30

## 2018-04-03 RX ADMIN — ALBUMIN (HUMAN) 75 G: 0.25 INJECTION, SOLUTION INTRAVENOUS at 09:53

## 2018-04-03 NOTE — DISCHARGE INSTRUCTIONS
ULTRASOUND GUIDED PARACENTESIS    Ultrasound guided paracentesis is a procedure which involves the insertion of a needle through the wall of the abdomen to remove fluid. This is done to find out the cause of fluid in the abdomen and/or to relieve the symptoms caused by it. Because this requires the insertion of a needle into the abdomen, there is a small risk of bleeding and infection.     Activity: Rest the remainder of the day. You may resume normal activity the next day. Avoid any vigorous physical activity for 24 hours.    Comfort: If you have any discomfort or tenderness at the site you may take your usual or recommended pain medication. Do not take aspirin the day of the procedure.    Diet: You may resume your usual diet.    Care of site: Keep the bandage on for 24 hours. Then you may remove the bandage and shower. You may put on a clean band-aid or leave open to the air.    RETURN TO THE EMERGENCY ROOM FOR:    Severe abdominal pain or fullness   Light-headedness or dizziness (especially when upright or standing)   Chest pain/tightness or shortness of breath    CALL YOUR DOCTOR FOR:    A fever over 101 degrees   Increased redness, increased swelling, and/or persistent drainage/discomfort around the site    For questions, problems or concerns, contact the Radiology Department at [phone].

## 2018-04-03 NOTE — IP AVS SNAPSHOT
[facility]    [address]    [place] [address]    Phone:  [phone]    Fax:  [phone]                                       After Visit Summary   4/3/2018    Philipp Jefferson    MRN: [id number]           After Visit Summary Signature Page     I have received my discharge instructions, and my questions have been answered. I have discussed any challenges I see with this plan with the nurse or doctor.    ..........................................................................................................................................  Patient/Patient Representative Signature      ..........................................................................................................................................  Patient Representative Print Name and Relationship to Patient    ..................................................               ................................................  Date                                            Time    ..........................................................................................................................................  Reviewed by Signature/Title    ...................................................              ..............................................  Date                                                            Time

## 2018-04-03 NOTE — PROGRESS NOTES
Patient discharge at this time, ambulatory, denies dizziness, VSS, verbally discussed discharge instructions, chooses not to keep written discharge instructions. No drainage from left abdomen puncture site, dressing dry and intact.

## 2018-04-03 NOTE — PROGRESS NOTES
Paracentesis complete, 9200 ml of pinkish cayden fluid removed, catheter removed without difficulty from left abdomen, pressure held to site with sterile gauze for 5 minutes, followed by glue closure, sterile 2x2s folded, and Tegaderm. Patient tolerated procedure well, ambulated to DSU at this time for albumin infusion.

## 2018-04-03 NOTE — IP AVS SNAPSHOT
MRN:[id number]                      After Visit Summary   4/3/2018    Philipp Jefferson    MRN: [id number]           Visit Information        Provider Department      4/3/2018  8:15 AM GHRAD1;  IMAGING NURSE; GHUS1 [facility]           Review of your medicines      UNREVIEWED medicines. Ask your doctor about these medicines        Dose / Directions    aMILoride 5 MG tablet   Commonly known as:  MIDAMOR   Indication:  Abnormal Accumulation of Clear Abdominal Fluid        Dose:  40 mg   Take 40 mg by mouth daily   Refills:  0       ciprofloxacin 500 MG tablet   Commonly known as:  CIPRO        Dose:  1 tablet   Take 1 tablet by mouth daily   Refills:  0       D 5000 5000 UNITS Tabs   Generic drug:  Cholecalciferol        Dose:  39802 Units   Take 50,000 Units by mouth once a week For 12 weeks.   Refills:  0       furosemide 40 MG tablet   Commonly known as:  LASIX        Dose:  4 tablet   Take 4 tablets by mouth daily Until seen in clinic   Refills:  0       gabapentin 300 MG capsule   Commonly known as:  NEURONTIN        Dose:  300 mg   Take 300 mg by mouth 3 times daily   Refills:  0       GENERLAC 10 GM/15ML solution   Generic drug:  lactulose        Dose:  5 mL   Take 5 mLs by mouth daily   Refills:  0       phytonadione 5 MG tablet   Commonly known as:  MEPHYTON        Dose:  1 tablet   Take 1 tablet by mouth daily   Refills:  0       potassium chloride 10 MEQ CR capsule   Commonly known as:  MICRO-K        Dose:  10 mEq   Take 10 mEq by mouth daily   Refills:  0       * pramipexole 0.25 MG tablet   Commonly known as:  MIRAPEX        Dose:  1 tablet   Take 1 tablet by mouth daily   Refills:  0       * pramipexole 0.125 MG tablet   Commonly known as:  MIRAPEX        Dose:  1 tablet   Take 1 tablet by mouth daily   Refills:  0       propranolol 40 MG tablet   Commonly known as:  INDERAL        Dose:  40 mg   Take 40 mg by mouth 2 times daily   Refills:  0       rifaximin 550  MG Tabs tablet   Commonly known as:  XIFAXAN        Dose:  550 mg   Take 550 mg by mouth 2 times daily   Refills:  0       vitamin A 22838 UNIT capsule        Dose:  24420 Units   Take 10,000 Units by mouth daily   Refills:  0       vitamin E 400 UNIT capsule        Dose:  400 Units   Take 400 Units by mouth daily   Refills:  0       zinc sulfate 220 (50 ZN) MG capsule   Commonly known as:  ZINCATE        Dose:  220 mg   Take 220 mg by mouth daily   Refills:  0       * Notice:  This list has 2 medication(s) that are the same as other medications prescribed for you. Read the directions carefully, and ask your doctor or other care provider to review them with you.             Protect others around you: Learn how to safely use, store and throw away your medicines at www.disposemymeds.org.         Follow-ups after your visit        Your next 10 appointments already scheduled     Apr 10, 2018  8:00 AM CDT   [facility]  [facility]    [address] [facility] [id number] [phone]           Please do not eat 10-12 hours before your appointment if you are coming in fasting for labs on lipids, cholesterol, or glucose (sugar). This does not apply to pregnant women. Water, hot tea and black coffee (with nothing added) are okay. Do not drink other fluids, diet soda or chew gum.            Apr 10, 2018  8:30 AM CDT   (Arrive by 8:15 AM)   US PARACENTESIS with NEHEMIAHUS2,  IMAGING NURSE, GHRAD[facility]   [facility]    [address]  [place] [address] [phone]           Bring a list of your medicines to the exam. Include vitamins, minerals and over-the-counter drugs.  Tell your doctor in advance:   If you are or may be pregnant.   If you are taking Coumadin (or any other blood thinners) 5 days prior to the exam for any special instructions.  IF YOUR DOCTOR HAS TOLD YOU THAT YOU WILL BE  RECEIVING SEDATION (medicine to help you relax): (Typically sedation is only for liver exams at [facility] and Renal Biopsy exams in Pediatrics)   See your family doctor for an exam within 30 days of treatment.   Plan for an adult to drive you home and stay with you for at least 24 hours.   No eating or drinking for 4 hours before your test. You may take medicine with small sips of water.   If you have diabetes:If you take insulin, call your diabetes care team for any special instructions for this exam.  Please call the Imaging Department at your exam site with any questions.             Apr 17, 2018  8:15 AM CDT   (Arrive by 8:00 AM)   US PARACENTESIS with GHUS1, NEHEMIAH IMAGING NURSE, GHRAD1   [facility] ([facility] [place])    [address]  [place] [id number] [phone]           Bring a list of your medicines to the exam. Include vitamins, minerals and over-the-counter drugs.  Tell your doctor in advance:   If you are or may be pregnant.   If you are taking Coumadin (or any other blood thinners) 5 days prior to the exam for any special instructions.  IF YOUR DOCTOR HAS TOLD YOU THAT YOU WILL BE RECEIVING SEDATION (medicine to help you relax): (Typically sedation is only for liver exams at [facility] and Renal Biopsy exams in Pediatrics)   See your family doctor for an exam within 30 days of treatment.   Plan for an adult to drive you home and stay with you for at least 24 hours.   No eating or drinking for 4 hours before your test. You may take medicine with small sips of water.   If you have diabetes:If you take insulin, call your diabetes care team for any special instructions for this exam.  Please call the Imaging Department at your exam site with any questions.             Apr 24, 2018  8:15 AM CDT   (Arrive by 8:00 AM)   US PARACENTESIS with GHUS1, NEHEMIAH IMAGING NURSE, GHRAD1   [facility] ([facility])    [address]  [place] [address] [phone]           Bring a list of your medicines to the exam. Include vitamins, minerals and over-the-counter drugs.  Tell your doctor in advance:   If you are or may be pregnant.   If you are taking Coumadin (or any other blood thinners) 5 days prior to the exam for any special instructions.  IF YOUR DOCTOR HAS TOLD YOU THAT YOU WILL BE RECEIVING SEDATION (medicine to help you relax): (Typically sedation is only for liver exams at [facility] and Renal Biopsy exams in Pediatrics)   See your family doctor for an exam within 30 days of treatment.   Plan for an adult to drive you home and stay with you for at least 24 hours.   No eating or drinking for 4 hours before your test. You may take medicine with small sips of water.   If you have diabetes:If you take insulin, call your diabetes care team for any special instructions for this exam.  Please call the Imaging Department at your exam site with any questions.                Care Instructions        Further instructions from your care team       ULTRASOUND GUIDED PARACENTESIS    Ultrasound guided paracentesis is a procedure which involves the insertion of a needle through the wall of the abdomen to remove fluid. This is done to find out the cause of fluid in the abdomen and/or to relieve the symptoms caused by it. Because this requires the insertion of a needle into the abdomen, there is a small risk of bleeding and infection.     Activity: Rest the remainder of the day. You may resume normal activity the next day. Avoid any vigorous physical activity for 24 hours.    Comfort: If you have any discomfort or tenderness at the site you may take your usual or recommended pain medication. Do not take aspirin the day of the procedure.    Diet: You may resume your usual diet.    Care of site: Keep the bandage on for 24 hours. Then you may remove the bandage and shower. You may put on a clean  "band-aid or leave open to the air.    RETURN TO THE EMERGENCY ROOM FOR:    Severe abdominal pain or fullness   Light-headedness or dizziness (especially when upright or standing)   Chest pain/tightness or shortness of breath    CALL YOUR DOCTOR FOR:    A fever over 101 degrees   Increased redness, increased swelling, and/or persistent drainage/discomfort around the site    For questions, problems or concerns, contact the Radiology Department at [phone].          Additional Information About Your Visit        [employer] Information     [employer] lets you send messages to your doctor, view your test results, renew your prescriptions, schedule appointments and more. To sign up, go to [email or web address] . Click on \"Log in\" on the left side of the screen, which will take you to the Welcome page. Then click on \"Sign up Now\" on the right side of the page.     You will be asked to enter the access code listed below, as well as some personal information. Please follow the directions to create your username and password.     Your access code is: [email or web address]  Expires: 2018  4:30 PM     Your access code will  in 90 days. If you need help or a new code, please call your [facility] clinic or [phone].        Care EveryWhere ID     This is your Care EveryWhere ID. This could be used by other organizations to access your [facility] medical records  [id number]        Your Vitals Were     Blood Pressure Pulse Temperature Respirations Pulse Oximetry       133/74 (BP Location: Right arm) 79 97.6  F (36.4  C) (Tympanic) 20 100%        Primary Care Provider Office Phone # Fax #    [facility] [phone]      Equal Access to Services     [facility]: Hadii yeny schaffer Soharrison, waaxda luqadaha, qaybta kaalmaskylar ramirez. So [facility] [phone].    ATENCIÓN: Si habla español, tiene a polk disposición servicios gratuitos de asistencia lingüística. Llame al " [phone].    We comply with applicable federal civil rights laws and [place] laws. We do not discriminate on the basis of race, color, national origin, age, disability, sex, sexual orientation, or gender identity.            Thank you!     Thank you for choosing [facility] for your care. Our goal is always to provide you with excellent care. Hearing back from our patients is one way we can continue to improve our services. Please take a few minutes to complete the written survey that you may receive in the mail after you visit with us. Thank you!             Medication List: This is a list of all your medications and when to take them. Check marks below indicate your daily home schedule. Keep this list as a reference.      Medications           Morning Afternoon Evening Bedtime As Needed    aMILoride 5 MG tablet   Commonly known as:  MIDAMOR   Take 40 mg by mouth daily                                ciprofloxacin 500 MG tablet   Commonly known as:  CIPRO   Take 1 tablet by mouth daily                                D 5000 5000 UNITS Tabs   Take 50,000 Units by mouth once a week For 12 weeks.   Generic drug:  Cholecalciferol                                furosemide 40 MG tablet   Commonly known as:  LASIX   Take 4 tablets by mouth daily Until seen in clinic                                gabapentin 300 MG capsule   Commonly known as:  NEURONTIN   Take 300 mg by mouth 3 times daily                                GENERLAC 10 GM/15ML solution   Take 5 mLs by mouth daily   Generic drug:  lactulose                                phytonadione 5 MG tablet   Commonly known as:  MEPHYTON   Take 1 tablet by mouth daily                                potassium chloride 10 MEQ CR capsule   Commonly known as:  MICRO-K   Take 10 mEq by mouth daily                                * pramipexole 0.25 MG tablet   Commonly known as:  MIRAPEX   Take 1 tablet by mouth daily                                * pramipexole 0.125 MG tablet    Commonly known as:  MIRAPEX   Take 1 tablet by mouth daily                                propranolol 40 MG tablet   Commonly known as:  INDERAL   Take 40 mg by mouth 2 times daily                                rifaximin 550 MG Tabs tablet   Commonly known as:  XIFAXAN   Take 550 mg by mouth 2 times daily                                vitamin A 47125 UNIT capsule   Take 10,000 Units by mouth daily                                vitamin E 400 UNIT capsule   Take 400 Units by mouth daily                                zinc sulfate 220 (50 ZN) MG capsule   Commonly known as:  ZINCATE   Take 220 mg by mouth daily                                * Notice:  This list has 2 medication(s) that are the same as other medications prescribed for you. Read the directions carefully, and ask your doctor or other care provider to review them with you.

## 2018-04-10 ENCOUNTER — HOSPITAL ENCOUNTER (OUTPATIENT)
Dept: INFUSION THERAPY | Facility: OTHER | Age: 63
End: 2018-04-10
Attending: FAMILY MEDICINE
Payer: COMMERCIAL

## 2018-04-10 ENCOUNTER — HOSPITAL ENCOUNTER (OUTPATIENT)
Dept: ULTRASOUND IMAGING | Facility: OTHER | Age: 63
Discharge: HOME | End: 2018-04-10
Attending: INTERNAL MEDICINE | Admitting: INTERNAL MEDICINE
Payer: COMMERCIAL

## 2018-04-10 VITALS
TEMPERATURE: 98.4 F | SYSTOLIC BLOOD PRESSURE: 128 MMHG | OXYGEN SATURATION: 100 % | DIASTOLIC BLOOD PRESSURE: 65 MMHG | HEART RATE: 77 BPM | RESPIRATION RATE: 18 BRPM

## 2018-04-10 VITALS
TEMPERATURE: 99 F | DIASTOLIC BLOOD PRESSURE: 61 MMHG | RESPIRATION RATE: 18 BRPM | HEART RATE: 73 BPM | SYSTOLIC BLOOD PRESSURE: 129 MMHG

## 2018-04-10 DIAGNOSIS — C22.0 CANCER, HEPATOCELLULAR (H): ICD-10-CM

## 2018-04-10 DIAGNOSIS — K70.30 ALCOHOLIC CIRRHOSIS OF LIVER (H): ICD-10-CM

## 2018-04-10 DIAGNOSIS — K74.60 CIRRHOSIS OF LIVER WITH ASCITES, UNSPECIFIED HEPATIC CIRRHOSIS TYPE (H): ICD-10-CM

## 2018-04-10 DIAGNOSIS — B18.2 CHRONIC HEPATITIS C (H): ICD-10-CM

## 2018-04-10 DIAGNOSIS — K74.60 CIRRHOSIS OF LIVER (H): ICD-10-CM

## 2018-04-10 DIAGNOSIS — R18.8 OTHER ASCITES: ICD-10-CM

## 2018-04-10 DIAGNOSIS — R18.8 CIRRHOSIS OF LIVER WITH ASCITES, UNSPECIFIED HEPATIC CIRRHOSIS TYPE (H): ICD-10-CM

## 2018-04-10 LAB — INR PPP: 1.63 (ref 0–1.3)

## 2018-04-10 PROCEDURE — 25000128 H RX IP 250 OP 636: Performed by: INTERNAL MEDICINE

## 2018-04-10 PROCEDURE — 27210238 US PARACENTESIS

## 2018-04-10 PROCEDURE — 36415 COLL VENOUS BLD VENIPUNCTURE: CPT | Performed by: INTERNAL MEDICINE

## 2018-04-10 PROCEDURE — 36430 TRANSFUSION BLD/BLD COMPNT: CPT

## 2018-04-10 PROCEDURE — P9047 ALBUMIN (HUMAN), 25%, 50ML: HCPCS | Performed by: INTERNAL MEDICINE

## 2018-04-10 PROCEDURE — 96365 THER/PROPH/DIAG IV INF INIT: CPT

## 2018-04-10 PROCEDURE — 25000125 ZZHC RX 250: Performed by: RADIOLOGY

## 2018-04-10 PROCEDURE — 85610 PROTHROMBIN TIME: CPT | Performed by: INTERNAL MEDICINE

## 2018-04-10 RX ORDER — ALBUMIN (HUMAN) 12.5 G/50ML
25 SOLUTION INTRAVENOUS DAILY PRN
Status: DISCONTINUED | OUTPATIENT
Start: 2018-04-10 | End: 2018-04-11 | Stop reason: HOSPADM

## 2018-04-10 RX ADMIN — LIDOCAINE HYDROCHLORIDE 10 ML: 10 INJECTION, SOLUTION INFILTRATION; PERINEURAL at 10:00

## 2018-04-10 RX ADMIN — ALBUMIN (HUMAN) 25 G: 0.25 INJECTION, SOLUTION INTRAVENOUS at 10:36

## 2018-04-10 NOTE — PROGRESS NOTES
Paracentesis done with 3000 ml removed- pharmacy notified. Skin adhesive, 2x2s and tegaderm to 2 sites.  To infusion for albumin.

## 2018-04-10 NOTE — PROGRESS NOTES
Infusion Nursing Note:  Philipp Jefferson presents today for Albumin infusion.    Patient seen by provider today: Yes: Radiology for paracenthesis   present during visit today: Not Applicable.    Note: N/A.    Intravenous Access:  Peripheral IV placed.      Post Infusion Assessment:  Patient tolerated infusion without incident.  Blood return noted pre and post infusion.  Site patent and intact, free from redness, edema or discomfort.  No evidence of extravasations.  Access discontinued per protocol.    Discharge Plan:   Patient discharged in stable condition accompanied by: self.  Departure Mode: Ambulatory.    Brenda J. Goodell, RN

## 2018-04-26 DIAGNOSIS — K70.30 ALCOHOLIC CIRRHOSIS OF LIVER (H): ICD-10-CM

## 2018-04-26 DIAGNOSIS — R18.8 OTHER ASCITES: ICD-10-CM

## 2018-04-26 DIAGNOSIS — B18.2 CHRONIC HEPATITIS C WITH HEPATIC COMA (H): ICD-10-CM

## 2018-04-26 DIAGNOSIS — C22.0 CANCER, HEPATOCELLULAR (H): ICD-10-CM

## 2018-04-26 DIAGNOSIS — B18.2 CHRONIC HEPATITIS C (H): ICD-10-CM

## 2018-04-26 LAB
ALBUMIN SERPL-MCNC: 3.6 G/DL (ref 3.5–5.7)
ALP SERPL-CCNC: 91 U/L (ref 34–104)
ALT SERPL W P-5'-P-CCNC: 8 U/L (ref 7–52)
ANION GAP SERPL CALCULATED.3IONS-SCNC: 10 MMOL/L (ref 3–14)
AST SERPL W P-5'-P-CCNC: 13 U/L (ref 13–39)
BASOPHILS # BLD AUTO: 0 10E9/L (ref 0–0.2)
BASOPHILS NFR BLD AUTO: 0.7 %
BILIRUB SERPL-MCNC: 2.3 MG/DL (ref 0.3–1)
BUN SERPL-MCNC: 46 MG/DL (ref 7–25)
CHLORIDE SERPL-SCNC: 105 MMOL/L (ref 98–107)
CO2 SERPL-SCNC: 20 MMOL/L (ref 21–31)
CREAT SERPL-MCNC: 2.81 MG/DL (ref 0.7–1.3)
DIFFERENTIAL METHOD BLD: ABNORMAL
EOSINOPHIL # BLD AUTO: 0.1 10E9/L (ref 0–0.7)
EOSINOPHIL NFR BLD AUTO: 3.7 %
ERYTHROCYTE [DISTWIDTH] IN BLOOD BY AUTOMATED COUNT: 16.1 % (ref 10–15)
GFR SERPL CREATININE-BSD FRML MDRD: 23 ML/MIN/1.7M2
HCT VFR BLD AUTO: 21.9 % (ref 40–53)
HGB BLD-MCNC: 7.5 G/DL (ref 13.3–17.7)
IMM GRANULOCYTES # BLD: 0 10E9/L (ref 0–0.4)
IMM GRANULOCYTES NFR BLD: 0.4 %
INR PPP: 1.9 (ref 0–1.3)
LYMPHOCYTES # BLD AUTO: 0.3 10E9/L (ref 0.8–5.3)
LYMPHOCYTES NFR BLD AUTO: 9.7 %
MCH RBC QN AUTO: 32.6 PG (ref 26.5–33)
MCHC RBC AUTO-ENTMCNC: 34.2 G/DL (ref 31.5–36.5)
MCV RBC AUTO: 95 FL (ref 78–100)
MONOCYTES # BLD AUTO: 0.3 10E9/L (ref 0–1.3)
MONOCYTES NFR BLD AUTO: 11.9 %
NEUTROPHILS # BLD AUTO: 2 10E9/L (ref 1.6–8.3)
NEUTROPHILS NFR BLD AUTO: 73.6 %
PLATELET # BLD AUTO: 37 10E9/L (ref 150–450)
POTASSIUM SERPL-SCNC: 3.6 MMOL/L (ref 3.5–5.1)
RBC # BLD AUTO: 2.3 10E12/L (ref 4.4–5.9)
SODIUM SERPL-SCNC: 135 MMOL/L (ref 134–144)
WBC # BLD AUTO: 2.7 10E9/L (ref 4–11)

## 2018-04-26 PROCEDURE — 82247 BILIRUBIN TOTAL: CPT

## 2018-04-26 PROCEDURE — 85610 PROTHROMBIN TIME: CPT | Performed by: INTERNAL MEDICINE

## 2018-04-26 PROCEDURE — 84460 ALANINE AMINO (ALT) (SGPT): CPT

## 2018-04-26 PROCEDURE — 82040 ASSAY OF SERUM ALBUMIN: CPT

## 2018-04-26 PROCEDURE — 84520 ASSAY OF UREA NITROGEN: CPT

## 2018-04-26 PROCEDURE — 84450 TRANSFERASE (AST) (SGOT): CPT

## 2018-04-26 PROCEDURE — 36415 COLL VENOUS BLD VENIPUNCTURE: CPT | Performed by: INTERNAL MEDICINE

## 2018-04-26 PROCEDURE — 82565 ASSAY OF CREATININE: CPT

## 2018-04-26 PROCEDURE — 84075 ASSAY ALKALINE PHOSPHATASE: CPT

## 2018-04-26 PROCEDURE — 85025 COMPLETE CBC W/AUTO DIFF WBC: CPT

## 2018-04-26 PROCEDURE — 80051 ELECTROLYTE PANEL: CPT

## 2018-05-01 ENCOUNTER — HOSPITAL ENCOUNTER (OUTPATIENT)
Dept: INFUSION THERAPY | Facility: OTHER | Age: 63
End: 2018-05-01
Attending: INTERNAL MEDICINE
Payer: COMMERCIAL

## 2018-05-01 ENCOUNTER — HOSPITAL ENCOUNTER (OUTPATIENT)
Dept: ULTRASOUND IMAGING | Facility: OTHER | Age: 63
Discharge: HOME | End: 2018-05-01
Attending: INTERNAL MEDICINE | Admitting: INTERNAL MEDICINE
Payer: COMMERCIAL

## 2018-05-01 VITALS
TEMPERATURE: 96.9 F | RESPIRATION RATE: 16 BRPM | DIASTOLIC BLOOD PRESSURE: 54 MMHG | SYSTOLIC BLOOD PRESSURE: 114 MMHG | HEART RATE: 69 BPM

## 2018-05-01 VITALS
DIASTOLIC BLOOD PRESSURE: 76 MMHG | SYSTOLIC BLOOD PRESSURE: 134 MMHG | TEMPERATURE: 98.1 F | HEART RATE: 69 BPM | RESPIRATION RATE: 18 BRPM | OXYGEN SATURATION: 100 %

## 2018-05-01 DIAGNOSIS — K74.60 CIRRHOSIS OF LIVER WITH ASCITES, UNSPECIFIED HEPATIC CIRRHOSIS TYPE (H): ICD-10-CM

## 2018-05-01 DIAGNOSIS — R18.8 CIRRHOSIS OF LIVER WITH ASCITES, UNSPECIFIED HEPATIC CIRRHOSIS TYPE (H): ICD-10-CM

## 2018-05-01 DIAGNOSIS — K74.60 CIRRHOSIS OF LIVER (H): ICD-10-CM

## 2018-05-01 PROCEDURE — 96365 THER/PROPH/DIAG IV INF INIT: CPT

## 2018-05-01 PROCEDURE — 36430 TRANSFUSION BLD/BLD COMPNT: CPT | Mod: XU

## 2018-05-01 PROCEDURE — 25000125 ZZHC RX 250: Performed by: RADIOLOGY

## 2018-05-01 PROCEDURE — 25000128 H RX IP 250 OP 636: Performed by: INTERNAL MEDICINE

## 2018-05-01 PROCEDURE — P9047 ALBUMIN (HUMAN), 25%, 50ML: HCPCS | Performed by: INTERNAL MEDICINE

## 2018-05-01 PROCEDURE — 27210238 ZZH NEEDLE CATHETER

## 2018-05-01 PROCEDURE — 49083 ABD PARACENTESIS W/IMAGING: CPT

## 2018-05-01 RX ORDER — ALBUMIN (HUMAN) 12.5 G/50ML
25 SOLUTION INTRAVENOUS DAILY PRN
Status: CANCELLED
Start: 2018-05-01

## 2018-05-01 RX ORDER — ALBUMIN (HUMAN) 12.5 G/50ML
50 SOLUTION INTRAVENOUS DAILY PRN
Status: DISCONTINUED | OUTPATIENT
Start: 2018-05-01 | End: 2018-05-02 | Stop reason: HOSPADM

## 2018-05-01 RX ADMIN — LIDOCAINE HYDROCHLORIDE 10 ML: 10 INJECTION, SOLUTION INFILTRATION; PERINEURAL at 08:28

## 2018-05-01 RX ADMIN — ALBUMIN (HUMAN) 50 G: 0.25 INJECTION, SOLUTION INTRAVENOUS at 09:44

## 2018-05-01 NOTE — PROGRESS NOTES
Pt here for infusion of albumin.  IV inserted with blood return and infusing fluids without incident.  Pt tolerated albumin infusion  without any incident.    IV with blood return, flushed, and discontinued without incident upon discharge.  Planned scheduled return May 8, 2018 on  for next albumin infusion.  Pt left ambulatory..... Yamile Stahl RN.

## 2018-05-01 NOTE — PROGRESS NOTES
Patient taken via wheelchair to infusion for albumin infusion, pharmacy notified of paracentesis amount,

## 2018-05-01 NOTE — PROGRESS NOTES
Paracentesis catheter removed from left abdomen site, pressure held to site for 5 minutes, followed by skin adhesive closure, sterile 2x2 and Tegaderm dressing. Patient tolerated procedure well. 5500 ml of clear pink tinged fluid removed.

## 2018-05-08 ENCOUNTER — HOSPITAL ENCOUNTER (OUTPATIENT)
Dept: INFUSION THERAPY | Facility: OTHER | Age: 63
End: 2018-05-08
Attending: INTERNAL MEDICINE
Payer: COMMERCIAL

## 2018-05-08 ENCOUNTER — HOSPITAL ENCOUNTER (OUTPATIENT)
Dept: ULTRASOUND IMAGING | Facility: OTHER | Age: 63
Discharge: HOME | End: 2018-05-08
Attending: INTERNAL MEDICINE | Admitting: INTERNAL MEDICINE
Payer: COMMERCIAL

## 2018-05-08 VITALS
HEART RATE: 70 BPM | TEMPERATURE: 98.4 F | SYSTOLIC BLOOD PRESSURE: 125 MMHG | OXYGEN SATURATION: 99 % | DIASTOLIC BLOOD PRESSURE: 75 MMHG | RESPIRATION RATE: 16 BRPM

## 2018-05-08 VITALS
HEART RATE: 70 BPM | DIASTOLIC BLOOD PRESSURE: 52 MMHG | TEMPERATURE: 96.8 F | SYSTOLIC BLOOD PRESSURE: 108 MMHG | RESPIRATION RATE: 16 BRPM

## 2018-05-08 DIAGNOSIS — K74.60 CIRRHOSIS OF LIVER WITH ASCITES, UNSPECIFIED HEPATIC CIRRHOSIS TYPE (H): ICD-10-CM

## 2018-05-08 DIAGNOSIS — R18.8 CIRRHOSIS OF LIVER WITH ASCITES, UNSPECIFIED HEPATIC CIRRHOSIS TYPE (H): ICD-10-CM

## 2018-05-08 DIAGNOSIS — K74.60 CIRRHOSIS OF LIVER (H): ICD-10-CM

## 2018-05-08 PROCEDURE — P9047 ALBUMIN (HUMAN), 25%, 50ML: HCPCS | Performed by: INTERNAL MEDICINE

## 2018-05-08 PROCEDURE — 96365 THER/PROPH/DIAG IV INF INIT: CPT

## 2018-05-08 PROCEDURE — 25000125 ZZHC RX 250: Performed by: RADIOLOGY

## 2018-05-08 PROCEDURE — 27210190 US PARACENTESIS

## 2018-05-08 PROCEDURE — 25000128 H RX IP 250 OP 636: Performed by: INTERNAL MEDICINE

## 2018-05-08 RX ORDER — ALBUMIN (HUMAN) 12.5 G/50ML
25 SOLUTION INTRAVENOUS DAILY PRN
Status: CANCELLED
Start: 2018-05-08

## 2018-05-08 RX ORDER — ALBUMIN (HUMAN) 12.5 G/50ML
50 SOLUTION INTRAVENOUS DAILY PRN
Status: DISCONTINUED | OUTPATIENT
Start: 2018-05-08 | End: 2018-05-09 | Stop reason: HOSPADM

## 2018-05-08 RX ADMIN — LIDOCAINE HYDROCHLORIDE 10 ML: 10 INJECTION, SOLUTION INFILTRATION; PERINEURAL at 08:20

## 2018-05-08 RX ADMIN — ALBUMIN (HUMAN) 50 G: 0.25 INJECTION, SOLUTION INTRAVENOUS at 09:48

## 2018-05-08 NOTE — PROGRESS NOTES
Pressure held on LUQ puncture site for 1 minute. Skin adhesive applied, sterile 2x2 placed over insertion site and covered with a sterile tegaderm.  6800 mLs removed from abdomen.

## 2018-05-08 NOTE — PROGRESS NOTES
Pt here for infusion of albumin.  IV inserted with blood return and infusing fluids without incident.  Pt tolerated albumin infusion  without any incident.    IV with blood return, flushed, and discontinued without incident upon discharge.  Planned scheduled return May 15, 2018 on  for next albumin infusion.  Pt left ambulatory..... Yamile Stahl RN.

## 2018-05-15 ENCOUNTER — HOSPITAL ENCOUNTER (OUTPATIENT)
Dept: ULTRASOUND IMAGING | Facility: OTHER | Age: 63
Discharge: HOME | End: 2018-05-15
Attending: INTERNAL MEDICINE | Admitting: INTERNAL MEDICINE
Payer: COMMERCIAL

## 2018-05-15 ENCOUNTER — HOSPITAL ENCOUNTER (OUTPATIENT)
Dept: INFUSION THERAPY | Facility: OTHER | Age: 63
End: 2018-05-15
Attending: INTERNAL MEDICINE
Payer: COMMERCIAL

## 2018-05-15 VITALS
SYSTOLIC BLOOD PRESSURE: 123 MMHG | DIASTOLIC BLOOD PRESSURE: 69 MMHG | HEART RATE: 68 BPM | TEMPERATURE: 97.8 F | RESPIRATION RATE: 18 BRPM

## 2018-05-15 VITALS
TEMPERATURE: 99.7 F | SYSTOLIC BLOOD PRESSURE: 138 MMHG | OXYGEN SATURATION: 99 % | HEART RATE: 74 BPM | DIASTOLIC BLOOD PRESSURE: 71 MMHG | HEIGHT: 68 IN | RESPIRATION RATE: 18 BRPM

## 2018-05-15 DIAGNOSIS — K70.30 ALCOHOLIC CIRRHOSIS OF LIVER (H): ICD-10-CM

## 2018-05-15 DIAGNOSIS — R18.8 CIRRHOSIS OF LIVER WITH ASCITES, UNSPECIFIED HEPATIC CIRRHOSIS TYPE (H): ICD-10-CM

## 2018-05-15 DIAGNOSIS — B18.2 CHRONIC HEPATITIS C WITH HEPATIC COMA (H): ICD-10-CM

## 2018-05-15 DIAGNOSIS — K74.60 CIRRHOSIS OF LIVER (H): ICD-10-CM

## 2018-05-15 DIAGNOSIS — C22.0 CANCER, HEPATOCELLULAR (H): ICD-10-CM

## 2018-05-15 DIAGNOSIS — K74.60 CIRRHOSIS OF LIVER WITH ASCITES, UNSPECIFIED HEPATIC CIRRHOSIS TYPE (H): ICD-10-CM

## 2018-05-15 DIAGNOSIS — R18.8 OTHER ASCITES: ICD-10-CM

## 2018-05-15 DIAGNOSIS — B18.2 CHRONIC HEPATITIS C (H): ICD-10-CM

## 2018-05-15 LAB
ALBUMIN SERPL-MCNC: 3.3 G/DL (ref 3.5–5.7)
ALP SERPL-CCNC: 136 U/L (ref 34–104)
ALT SERPL W P-5'-P-CCNC: 8 U/L (ref 7–52)
AST SERPL W P-5'-P-CCNC: 13 U/L (ref 13–39)
BASOPHILS # BLD AUTO: 0 10E9/L (ref 0–0.2)
BASOPHILS NFR BLD AUTO: 0.8 %
BILIRUB SERPL-MCNC: 1.8 MG/DL (ref 0.3–1)
CREAT SERPL-MCNC: 1.69 MG/DL (ref 0.7–1.3)
DIFFERENTIAL METHOD BLD: ABNORMAL
EOSINOPHIL # BLD AUTO: 0.1 10E9/L (ref 0–0.7)
EOSINOPHIL NFR BLD AUTO: 4.4 %
ERYTHROCYTE [DISTWIDTH] IN BLOOD BY AUTOMATED COUNT: 15.9 % (ref 10–15)
GFR SERPL CREATININE-BSD FRML MDRD: 42 ML/MIN/1.7M2
HCT VFR BLD AUTO: 23.9 % (ref 40–53)
HGB BLD-MCNC: 8.2 G/DL (ref 13.3–17.7)
IMM GRANULOCYTES # BLD: 0 10E9/L (ref 0–0.4)
IMM GRANULOCYTES NFR BLD: 0.4 %
INR PPP: 1.62 (ref 0–1.3)
LYMPHOCYTES # BLD AUTO: 0.3 10E9/L (ref 0.8–5.3)
LYMPHOCYTES NFR BLD AUTO: 12.7 %
MCH RBC QN AUTO: 33.1 PG (ref 26.5–33)
MCHC RBC AUTO-ENTMCNC: 34.3 G/DL (ref 31.5–36.5)
MCV RBC AUTO: 96 FL (ref 78–100)
MONOCYTES # BLD AUTO: 0.3 10E9/L (ref 0–1.3)
MONOCYTES NFR BLD AUTO: 13.5 %
NEUTROPHILS # BLD AUTO: 1.7 10E9/L (ref 1.6–8.3)
NEUTROPHILS NFR BLD AUTO: 68.2 %
PLATELET # BLD AUTO: 41 10E9/L (ref 150–450)
POTASSIUM SERPL-SCNC: 3.2 MMOL/L (ref 3.5–5.1)
RBC # BLD AUTO: 2.48 10E12/L (ref 4.4–5.9)
SODIUM SERPL-SCNC: 138 MMOL/L (ref 134–144)
WBC # BLD AUTO: 2.5 10E9/L (ref 4–11)

## 2018-05-15 PROCEDURE — 85610 PROTHROMBIN TIME: CPT

## 2018-05-15 PROCEDURE — 84450 TRANSFERASE (AST) (SGOT): CPT

## 2018-05-15 PROCEDURE — 85025 COMPLETE CBC W/AUTO DIFF WBC: CPT

## 2018-05-15 PROCEDURE — 82247 BILIRUBIN TOTAL: CPT

## 2018-05-15 PROCEDURE — 36415 COLL VENOUS BLD VENIPUNCTURE: CPT

## 2018-05-15 PROCEDURE — 49083 ABD PARACENTESIS W/IMAGING: CPT

## 2018-05-15 PROCEDURE — 84460 ALANINE AMINO (ALT) (SGPT): CPT

## 2018-05-15 PROCEDURE — 25000125 ZZHC RX 250: Performed by: RADIOLOGY

## 2018-05-15 PROCEDURE — 82565 ASSAY OF CREATININE: CPT

## 2018-05-15 PROCEDURE — 25000128 H RX IP 250 OP 636: Performed by: INTERNAL MEDICINE

## 2018-05-15 PROCEDURE — P9047 ALBUMIN (HUMAN), 25%, 50ML: HCPCS | Performed by: INTERNAL MEDICINE

## 2018-05-15 PROCEDURE — 84132 ASSAY OF SERUM POTASSIUM: CPT

## 2018-05-15 PROCEDURE — 84075 ASSAY ALKALINE PHOSPHATASE: CPT

## 2018-05-15 PROCEDURE — 82040 ASSAY OF SERUM ALBUMIN: CPT

## 2018-05-15 PROCEDURE — 84295 ASSAY OF SERUM SODIUM: CPT

## 2018-05-15 PROCEDURE — 96365 THER/PROPH/DIAG IV INF INIT: CPT | Mod: XU

## 2018-05-15 RX ORDER — ALBUMIN (HUMAN) 12.5 G/50ML
25 SOLUTION INTRAVENOUS DAILY PRN
Status: DISCONTINUED | OUTPATIENT
Start: 2018-05-15 | End: 2018-05-16 | Stop reason: HOSPADM

## 2018-05-15 RX ORDER — ALBUMIN (HUMAN) 12.5 G/50ML
25 SOLUTION INTRAVENOUS DAILY PRN
Status: CANCELLED
Start: 2018-05-15

## 2018-05-15 RX ADMIN — LIDOCAINE HYDROCHLORIDE 3 ML: 10 INJECTION, SOLUTION INFILTRATION; PERINEURAL at 12:35

## 2018-05-15 RX ADMIN — ALBUMIN (HUMAN) 25 G: 0.25 INJECTION, SOLUTION INTRAVENOUS at 13:58

## 2018-05-15 NOTE — PROGRESS NOTES
Infusion Nursing Note:  Philipp Jefferson presents today for Albumin infusion for today's paracentesis with results of 2900 ml.    Patient seen by provider today: No   present during visit today: Not Applicable.    Note: Difficult IV start today, 2 attempts by this RN and 1 by Juliette BATISTA RN unsuccessfully. Anesthesia started after the 4th try.     Intravenous Access:  Peripheral IV placed.    Treatment Conditions:  Not Applicable.      Post Infusion Assessment:  Patient tolerated infusion without incident.  Blood return noted pre and post infusion.  Site patent and intact, free from redness, edema or discomfort.  No evidence of extravasations.  Access discontinued per protocol.    Discharge Plan:   Patient discharged in stable condition accompanied by: self.  Departure Mode: Ambulatory.    Brenda J. Goodell, RN

## 2018-05-15 NOTE — DISCHARGE INSTRUCTIONS
ULTRASOUND GUIDED PARACENTESIS    Ultrasound guided paracentesis is a procedure which involves the insertion of a needle through the wall of the abdomen to remove fluid. This is done to find out the cause of fluid in the abdomen and/or to relieve the symptoms caused by it. Because this requires the insertion of a needle into the abdomen, there is a small risk of bleeding and infection.     Activity: Rest the remainder of the day. You may resume normal activity the next day. Avoid any vigorous physical activity for 24 hours.    Comfort: If you have any discomfort or tenderness at the site you may take your usual or recommended pain medication. Do not take aspirin the day of the procedure.    Diet: You may resume your usual diet.    Care of site: Keep the bandage on for 24 hours. Then you may remove the bandage and shower. You may put on a clean band-aid or leave open to the air.    RETURN TO THE EMERGENCY ROOM FOR:    Severe abdominal pain or fullness   Light-headedness or dizziness (especially when upright or standing)   Chest pain/tightness or shortness of breath    CALL YOUR DOCTOR FOR:    A fever over 101 degrees   Increased redness, increased swelling, and/or persistent drainage/discomfort around the site    For questions, problems or concerns, contact the Radiology Department at [phone].

## 2018-05-15 NOTE — PROGRESS NOTES
Pressure held on puncture site for 2 minutes. Liquiband applied.  Sterile 2x2 placed over insertion site and covered with a sterile tegaderm.  Total of 2900 mls of fluid removed from abdomen.  Now to infusion.

## 2018-05-15 NOTE — PROGRESS NOTES
Called to start PIV, Time In: 1348 Time out: 1400  Attempt x 3, successful placement 22 ga in left hand.

## 2018-05-22 ENCOUNTER — HOSPITAL ENCOUNTER (OUTPATIENT)
Dept: INFUSION THERAPY | Facility: OTHER | Age: 63
End: 2018-05-22
Attending: INTERNAL MEDICINE
Payer: COMMERCIAL

## 2018-05-22 ENCOUNTER — HOSPITAL ENCOUNTER (OUTPATIENT)
Dept: ULTRASOUND IMAGING | Facility: OTHER | Age: 63
Discharge: HOME | End: 2018-05-22
Attending: INTERNAL MEDICINE | Admitting: INTERNAL MEDICINE
Payer: COMMERCIAL

## 2018-05-22 VITALS
SYSTOLIC BLOOD PRESSURE: 120 MMHG | RESPIRATION RATE: 18 BRPM | TEMPERATURE: 97.6 F | HEART RATE: 84 BPM | DIASTOLIC BLOOD PRESSURE: 80 MMHG | OXYGEN SATURATION: 97 %

## 2018-05-22 VITALS
TEMPERATURE: 95.8 F | HEART RATE: 70 BPM | RESPIRATION RATE: 18 BRPM | DIASTOLIC BLOOD PRESSURE: 63 MMHG | SYSTOLIC BLOOD PRESSURE: 128 MMHG

## 2018-05-22 DIAGNOSIS — K74.60 CIRRHOSIS OF LIVER (H): ICD-10-CM

## 2018-05-22 DIAGNOSIS — R18.8 CIRRHOSIS OF LIVER WITH ASCITES, UNSPECIFIED HEPATIC CIRRHOSIS TYPE (H): ICD-10-CM

## 2018-05-22 DIAGNOSIS — K74.60 CIRRHOSIS OF LIVER WITH ASCITES, UNSPECIFIED HEPATIC CIRRHOSIS TYPE (H): ICD-10-CM

## 2018-05-22 PROCEDURE — 27210190 US PARACENTESIS

## 2018-05-22 PROCEDURE — 96365 THER/PROPH/DIAG IV INF INIT: CPT

## 2018-05-22 PROCEDURE — 36410 VNPNXR 3YR/> PHY/QHP DX/THER: CPT | Performed by: NURSE ANESTHETIST, CERTIFIED REGISTERED

## 2018-05-22 PROCEDURE — P9047 ALBUMIN (HUMAN), 25%, 50ML: HCPCS | Performed by: INTERNAL MEDICINE

## 2018-05-22 PROCEDURE — 25000125 ZZHC RX 250: Performed by: RADIOLOGY

## 2018-05-22 PROCEDURE — 25000128 H RX IP 250 OP 636: Performed by: INTERNAL MEDICINE

## 2018-05-22 RX ORDER — ALBUMIN (HUMAN) 12.5 G/50ML
25 SOLUTION INTRAVENOUS DAILY PRN
Status: CANCELLED
Start: 2018-05-22

## 2018-05-22 RX ORDER — ALBUMIN (HUMAN) 12.5 G/50ML
62.5 SOLUTION INTRAVENOUS ONCE
Status: COMPLETED | OUTPATIENT
Start: 2018-05-22 | End: 2018-05-22

## 2018-05-22 RX ADMIN — ALBUMIN (HUMAN) 62.5 G: 0.25 INJECTION, SOLUTION INTRAVENOUS at 10:15

## 2018-05-22 RX ADMIN — LIDOCAINE HYDROCHLORIDE 10 ML: 10 INJECTION, SOLUTION INFILTRATION; PERINEURAL at 08:22

## 2018-05-22 NOTE — PROGRESS NOTES
Paracentesis complete, catheter removed, pressure held to site for 5 minutes, followed by glue closure, patient tolerated well, 8600 ml of clear light pink fluid obtained.  Telephone call to infusion center cell to inform of amount, patient transferred via wheelchair to infusion center for albumin infusion, hand off report to Juliette Stahl. MELO

## 2018-05-22 NOTE — PROGRESS NOTES
Pt here for infusion of albumin.  IV inserted with blood return and infusing fluids without incident.  Pt tolerated albumin infusion  without any incident.    IV with blood return, flushed, and discontinued without incident upon discharge.  Planned scheduled return May 29, 2018 on  for next albumin infusion.  Pt left ambulatory..... Yamile Stahl RN.

## 2018-05-29 ENCOUNTER — HOSPITAL ENCOUNTER (OUTPATIENT)
Dept: ULTRASOUND IMAGING | Facility: OTHER | Age: 63
Discharge: HOME | End: 2018-05-29
Attending: INTERNAL MEDICINE | Admitting: INTERNAL MEDICINE
Payer: COMMERCIAL

## 2018-05-29 ENCOUNTER — HOSPITAL ENCOUNTER (OUTPATIENT)
Dept: INFUSION THERAPY | Facility: OTHER | Age: 63
End: 2018-05-29
Attending: INTERNAL MEDICINE
Payer: COMMERCIAL

## 2018-05-29 VITALS — RESPIRATION RATE: 16 BRPM | TEMPERATURE: 98.5 F | OXYGEN SATURATION: 98 % | HEART RATE: 75 BPM

## 2018-05-29 VITALS
RESPIRATION RATE: 18 BRPM | SYSTOLIC BLOOD PRESSURE: 124 MMHG | DIASTOLIC BLOOD PRESSURE: 68 MMHG | HEART RATE: 73 BPM | TEMPERATURE: 98 F

## 2018-05-29 DIAGNOSIS — K74.60 CIRRHOSIS OF LIVER WITH ASCITES, UNSPECIFIED HEPATIC CIRRHOSIS TYPE (H): ICD-10-CM

## 2018-05-29 DIAGNOSIS — K74.60 CIRRHOSIS OF LIVER (H): ICD-10-CM

## 2018-05-29 DIAGNOSIS — R18.8 CIRRHOSIS OF LIVER WITH ASCITES, UNSPECIFIED HEPATIC CIRRHOSIS TYPE (H): ICD-10-CM

## 2018-05-29 PROCEDURE — 96365 THER/PROPH/DIAG IV INF INIT: CPT | Mod: XU

## 2018-05-29 PROCEDURE — P9047 ALBUMIN (HUMAN), 25%, 50ML: HCPCS | Performed by: INTERNAL MEDICINE

## 2018-05-29 PROCEDURE — 25000128 H RX IP 250 OP 636: Performed by: INTERNAL MEDICINE

## 2018-05-29 PROCEDURE — 25000125 ZZHC RX 250: Performed by: RADIOLOGY

## 2018-05-29 PROCEDURE — 27210238 US PARACENTESIS

## 2018-05-29 RX ORDER — ALBUMIN (HUMAN) 12.5 G/50ML
62.5 SOLUTION INTRAVENOUS DAILY PRN
Status: DISCONTINUED | OUTPATIENT
Start: 2018-05-29 | End: 2018-05-30 | Stop reason: HOSPADM

## 2018-05-29 RX ORDER — ALBUMIN (HUMAN) 12.5 G/50ML
25 SOLUTION INTRAVENOUS DAILY PRN
Status: CANCELLED
Start: 2018-05-29

## 2018-05-29 RX ADMIN — ALBUMIN (HUMAN) 62.5 G: 0.25 INJECTION, SOLUTION INTRAVENOUS at 10:21

## 2018-05-29 RX ADMIN — LIDOCAINE HYDROCHLORIDE 10 ML: 10 INJECTION, SOLUTION INFILTRATION; PERINEURAL at 08:35

## 2018-05-29 NOTE — PROGRESS NOTES
Pressure held on puncture site for 1 minutes. Skin adhesive applied, sterile 2x2 placed over insertion site and covered with a sterile tegaderm.    8000 mLs removed from abdomen.

## 2018-05-29 NOTE — PROGRESS NOTES
Pt here for infusion of albumin.  IV inserted with blood return and infusing fluids without incident.  Pt tolerated albumin infusion  without any incident.    IV with blood return, flushed, and discontinued without incident upon discharge.  Planned scheduled return June 5, 2018 on  for next albumin infusion.  Pt left ambulatory..... Yamile Stahl RN.

## 2018-06-05 ENCOUNTER — ANESTHESIA EVENT (OUTPATIENT)
Dept: ULTRASOUND IMAGING | Facility: OTHER | Age: 63
End: 2018-06-05
Payer: COMMERCIAL

## 2018-06-05 ENCOUNTER — HOSPITAL ENCOUNTER (OUTPATIENT)
Dept: ULTRASOUND IMAGING | Facility: OTHER | Age: 63
Discharge: HOME | End: 2018-06-05
Attending: INTERNAL MEDICINE | Admitting: INTERNAL MEDICINE
Payer: COMMERCIAL

## 2018-06-05 ENCOUNTER — ANESTHESIA (OUTPATIENT)
Dept: ULTRASOUND IMAGING | Facility: OTHER | Age: 63
End: 2018-06-05
Payer: COMMERCIAL

## 2018-06-05 ENCOUNTER — HOSPITAL ENCOUNTER (OUTPATIENT)
Dept: INFUSION THERAPY | Facility: OTHER | Age: 63
End: 2018-06-05
Attending: INTERNAL MEDICINE
Payer: COMMERCIAL

## 2018-06-05 VITALS
TEMPERATURE: 97.7 F | OXYGEN SATURATION: 100 % | SYSTOLIC BLOOD PRESSURE: 140 MMHG | HEART RATE: 75 BPM | DIASTOLIC BLOOD PRESSURE: 71 MMHG | RESPIRATION RATE: 18 BRPM

## 2018-06-05 VITALS — SYSTOLIC BLOOD PRESSURE: 127 MMHG | HEART RATE: 69 BPM | TEMPERATURE: 96.9 F | DIASTOLIC BLOOD PRESSURE: 59 MMHG

## 2018-06-05 DIAGNOSIS — K74.60 CIRRHOSIS OF LIVER (H): ICD-10-CM

## 2018-06-05 DIAGNOSIS — R18.8 CIRRHOSIS OF LIVER WITH ASCITES, UNSPECIFIED HEPATIC CIRRHOSIS TYPE (H): ICD-10-CM

## 2018-06-05 DIAGNOSIS — K74.60 CIRRHOSIS OF LIVER WITH ASCITES, UNSPECIFIED HEPATIC CIRRHOSIS TYPE (H): ICD-10-CM

## 2018-06-05 PROCEDURE — 96366 THER/PROPH/DIAG IV INF ADDON: CPT | Mod: XU

## 2018-06-05 PROCEDURE — 96365 THER/PROPH/DIAG IV INF INIT: CPT | Mod: XU

## 2018-06-05 PROCEDURE — 25000125 ZZHC RX 250: Performed by: RADIOLOGY

## 2018-06-05 PROCEDURE — 36410 VNPNXR 3YR/> PHY/QHP DX/THER: CPT | Performed by: NURSE ANESTHETIST, CERTIFIED REGISTERED

## 2018-06-05 PROCEDURE — 25000128 H RX IP 250 OP 636: Performed by: INTERNAL MEDICINE

## 2018-06-05 PROCEDURE — P9047 ALBUMIN (HUMAN), 25%, 50ML: HCPCS | Performed by: INTERNAL MEDICINE

## 2018-06-05 PROCEDURE — 49083 ABD PARACENTESIS W/IMAGING: CPT

## 2018-06-05 RX ORDER — ALBUMIN (HUMAN) 12.5 G/50ML
25 SOLUTION INTRAVENOUS DAILY PRN
Status: CANCELLED
Start: 2018-06-05

## 2018-06-05 RX ORDER — ALBUMIN (HUMAN) 12.5 G/50ML
75 SOLUTION INTRAVENOUS DAILY PRN
Status: DISCONTINUED | OUTPATIENT
Start: 2018-06-05 | End: 2018-06-06 | Stop reason: HOSPADM

## 2018-06-05 RX ORDER — LIDOCAINE HYDROCHLORIDE 10 MG/ML
INJECTION, SOLUTION INFILTRATION; PERINEURAL PRN
Status: DISCONTINUED | OUTPATIENT
Start: 2018-06-05 | End: 2018-06-05

## 2018-06-05 RX ADMIN — LIDOCAINE HYDROCHLORIDE 10 ML: 10 INJECTION, SOLUTION INFILTRATION; PERINEURAL at 08:28

## 2018-06-05 RX ADMIN — LIDOCAINE HYDROCHLORIDE 0.1 ML: 10 INJECTION, SOLUTION INFILTRATION; PERINEURAL at 12:24

## 2018-06-05 RX ADMIN — ALBUMIN (HUMAN) 75 G: 0.25 INJECTION, SOLUTION INTRAVENOUS at 10:11

## 2018-06-05 NOTE — PROGRESS NOTES
Paracentesis complete, catheter removed, pressure held  Site on right abdomen for 10 minutes, followed by skin adhesive closure, sterile 2x2 folded and Tegaderm.  Patient tolerated procedure well, 9800 ml of pink tinged fluid obtained.    Patient had leaking site for last paracentesis present on left abdomen, Area cleansed with alcohol, no redness or swelling in area, slight leaking of clear fluid noted, skin adhesive applied, followed by folded 2x2 and Tegaderm.    Telephone call to Infusion pharmacy to report paracentesis fluid result. Patient taken by wheelchair to Infusion Center for Albumin infusion.  Hand off report given to Park ALEJO

## 2018-06-05 NOTE — PROGRESS NOTES
Infusion Nursing Note:  Philipp Jefferson presents today for Albumin infusion post paracentesis.    Patient seen by provider today: No   present during visit today: Not Applicable.    Note: Patient arrived via wheelchair assisted by radiology RN.    Intravenous Access:  Peripheral IV placed to right top of hand.  Albumin administration/Infusion slow.  Requesting new IV site.  Anesthesia arrived to restart second site in forarm.  Continue to infuse at slower rate.  Tubing changed and with this infusion rate increased.      Treatment Conditions:  Not Applicable.      Post Infusion Assessment:  Patient tolerated infusion without incident.    Discharge Plan:   Patient discharged in stable condition accompanied by: self.  Patient will return next week    Sandra Ernst RN

## 2018-06-12 ENCOUNTER — HOSPITAL ENCOUNTER (OUTPATIENT)
Dept: INFUSION THERAPY | Facility: OTHER | Age: 63
End: 2018-06-12
Attending: INTERNAL MEDICINE
Payer: COMMERCIAL

## 2018-06-12 ENCOUNTER — HOSPITAL ENCOUNTER (OUTPATIENT)
Dept: ULTRASOUND IMAGING | Facility: OTHER | Age: 63
Discharge: HOME | End: 2018-06-12
Attending: INTERNAL MEDICINE | Admitting: INTERNAL MEDICINE
Payer: COMMERCIAL

## 2018-06-12 VITALS
DIASTOLIC BLOOD PRESSURE: 63 MMHG | TEMPERATURE: 98.7 F | HEART RATE: 61 BPM | RESPIRATION RATE: 16 BRPM | SYSTOLIC BLOOD PRESSURE: 117 MMHG

## 2018-06-12 VITALS
RESPIRATION RATE: 16 BRPM | DIASTOLIC BLOOD PRESSURE: 66 MMHG | TEMPERATURE: 97.7 F | OXYGEN SATURATION: 99 % | HEART RATE: 77 BPM | SYSTOLIC BLOOD PRESSURE: 133 MMHG

## 2018-06-12 DIAGNOSIS — K74.60 CIRRHOSIS OF LIVER (H): ICD-10-CM

## 2018-06-12 DIAGNOSIS — K74.60 CIRRHOSIS OF LIVER WITH ASCITES, UNSPECIFIED HEPATIC CIRRHOSIS TYPE (H): ICD-10-CM

## 2018-06-12 DIAGNOSIS — R18.8 CIRRHOSIS OF LIVER WITH ASCITES, UNSPECIFIED HEPATIC CIRRHOSIS TYPE (H): ICD-10-CM

## 2018-06-12 PROCEDURE — 25000128 H RX IP 250 OP 636: Performed by: INTERNAL MEDICINE

## 2018-06-12 PROCEDURE — 96365 THER/PROPH/DIAG IV INF INIT: CPT

## 2018-06-12 PROCEDURE — 25000125 ZZHC RX 250: Performed by: RADIOLOGY

## 2018-06-12 PROCEDURE — P9047 ALBUMIN (HUMAN), 25%, 50ML: HCPCS | Performed by: INTERNAL MEDICINE

## 2018-06-12 PROCEDURE — 27210238 US PARACENTESIS

## 2018-06-12 RX ORDER — ALBUMIN (HUMAN) 12.5 G/50ML
50 SOLUTION INTRAVENOUS DAILY PRN
Status: DISCONTINUED | OUTPATIENT
Start: 2018-06-12 | End: 2018-06-13 | Stop reason: HOSPADM

## 2018-06-12 RX ORDER — ALBUMIN (HUMAN) 12.5 G/50ML
25 SOLUTION INTRAVENOUS DAILY PRN
Status: CANCELLED
Start: 2018-06-12

## 2018-06-12 RX ADMIN — LIDOCAINE HYDROCHLORIDE 10 ML: 10 INJECTION, SOLUTION INFILTRATION; PERINEURAL at 09:35

## 2018-06-12 RX ADMIN — ALBUMIN (HUMAN) 50 G: 0.25 INJECTION, SOLUTION INTRAVENOUS at 09:53

## 2018-06-12 NOTE — DISCHARGE INSTRUCTIONS
ULTRASOUND GUIDED PARACENTESIS    Ultrasound guided paracentesis is a procedure which involves the insertion of a needle through the wall of the abdomen to remove fluid. This is done to find out the cause of fluid in the abdomen and/or to relieve the symptoms caused by it. Because this requires the insertion of a needle into the abdomen, there is a small risk of bleeding and infection.     Activity: Rest the remainder of the day. You may resume normal activity the next day. Avoid any vigorous physical activity for 24 hours.    Comfort: If you have any discomfort or tenderness at the site you may take your usual or recommended pain medication. Do not take aspirin the day of the procedure.    Diet: You may resume your usual diet.    Care of site: Keep the bandage on for 24 hours. Then you may remove the bandage and shower. You may put on a clean band-aid or leave open to the air.    RETURN TO THE EMERGENCY ROOM FOR:    Severe abdominal pain or fullness   Light-headedness or dizziness (especially when upright or standing)   Chest pain/tightness or shortness of breath    CALL YOUR DOCTOR FOR:    A fever over 101 degrees   Increased redness, increased swelling, and/or persistent drainage/discomfort around the site    For questions, problems or concerns, contact the Radiology Department at [phone].

## 2018-06-12 NOTE — IP AVS SNAPSHOT
[facility]    [address]    [place] [address]    Phone:  [phone]    Fax:  [phone]                                       After Visit Summary   6/12/2018    Philipp Jefferson    MRN: [id number]           After Visit Summary Signature Page     I have received my discharge instructions, and my questions have been answered. I have discussed any challenges I see with this plan with the nurse or doctor.    ..........................................................................................................................................  Patient/Patient Representative Signature      ..........................................................................................................................................  Patient Representative Print Name and Relationship to Patient    ..................................................               ................................................  Date                                            Time    ..........................................................................................................................................  Reviewed by Signature/Title    ...................................................              ..............................................  Date                                                            Time

## 2018-06-12 NOTE — PROGRESS NOTES
Paracentesis done with 5500 ml clear fluid removed. Skin adhesive 2x2 and tegaderm applied.  To infusion for albumin.

## 2018-06-12 NOTE — IP AVS SNAPSHOT
MRN:[id number]                      After Visit Summary   6/12/2018    Philipp Jefferson    MRN: [id number]           Visit Information        Provider Department      6/12/2018  8:15 AM GHRAD1;  IMAGING NURSE; GHUS1 [facility]           Review of your medicines      UNREVIEWED medicines. Ask your doctor about these medicines        Dose / Directions    ciprofloxacin 500 MG tablet   Commonly known as:  CIPRO        Dose:  1 tablet   Take 1 tablet by mouth daily   Refills:  0       D 5000 5000 units Tabs   Generic drug:  Cholecalciferol        Dose:  11434 Units   Take 50,000 Units by mouth once a week For 12 weeks.   Refills:  0       gabapentin 300 MG capsule   Commonly known as:  NEURONTIN        Dose:  300 mg   Take 300 mg by mouth 3 times daily   Refills:  0       GENERLAC 10 GM/15ML solution   Generic drug:  lactulose        Dose:  5 mL   Take 5 mLs by mouth daily   Refills:  0       phytonadione 5 MG tablet   Commonly known as:  MEPHYTON        Dose:  1 tablet   Take 1 tablet by mouth daily   Refills:  0       pramipexole 0.25 MG tablet   Commonly known as:  MIRAPEX        Dose:  1 tablet   Take 1 tablet by mouth daily   Refills:  0       propranolol 40 MG tablet   Commonly known as:  INDERAL        Dose:  40 mg   Take 40 mg by mouth 2 times daily   Refills:  0       rifaximin 550 MG Tabs tablet   Commonly known as:  XIFAXAN        Dose:  550 mg   Take 550 mg by mouth 2 times daily   Refills:  0       vitamin A 55299 UNIT capsule        Dose:  57127 Units   Take 10,000 Units by mouth daily   Refills:  0       vitamin E 400 UNIT capsule        Dose:  400 Units   Take 400 Units by mouth daily   Refills:  0       zinc sulfate 220 (50 Zn) MG capsule   Commonly known as:  ZINCATE        Dose:  220 mg   Take 220 mg by mouth daily   Refills:  0                Protect others around you: Learn how to safely use, store and throw away your medicines at www.disposemymeds.org.          Follow-ups after your visit        Your next 10 appointments already scheduled     Jun 12, 2018 10:00 AM CDT   Level 2 with GH INFUSION CHAIR 1   [facility] ([facility])    [address]  [place] [address]   [phone]            Jun 19, 2018  8:15 AM CDT   (Arrive by 8:00 AM)   US PARACENTESIS with NEHEMIAHUS1,  IMAGING NURSE, GHRAD1   [facility]    [address]  [place] [address]   [phone]           Bring a list of your medicines to the exam. Include vitamins, minerals and over-the-counter drugs.  Tell your doctor in advance:   If you are or may be pregnant.   If you are taking Coumadin (or any other blood thinners) 5 days prior to the exam for any special instructions.  IF YOUR DOCTOR HAS TOLD YOU THAT YOU WILL BE RECEIVING SEDATION (medicine to help you relax): (Typically sedation is only for liver exams at [facility] and Renal Biopsy exams in Pediatrics)   See your family doctor for an exam within 30 days of treatment.   Plan for an adult to drive you home and stay with you for at least 24 hours.   No eating or drinking for 4 hours before your test. You may take medicine with small sips of water.   If you have diabetes:If you take insulin, call your diabetes care team for any special instructions for this exam.  Please call the Imaging Department at your exam site with any questions.             Jun 19, 2018 10:00 AM CDT   Level 2 with  INFUSION CHAIR 1   [facility]    [address]  [place] [address]   [phone]               Care Instructions        Further instructions from your care team       ULTRASOUND GUIDED PARACENTESIS    Ultrasound guided paracentesis is a procedure which involves the insertion of a needle through the wall of the abdomen to remove fluid. This is done to find out  the cause of fluid in the abdomen and/or to relieve the symptoms caused by it. Because this requires the insertion of a needle into the abdomen, there is a small risk of bleeding and infection.     Activity: Rest the remainder of the day. You may resume normal activity the next day. Avoid any vigorous physical activity for 24 hours.    Comfort: If you have any discomfort or tenderness at the site you may take your usual or recommended pain medication. Do not take aspirin the day of the procedure.    Diet: You may resume your usual diet.    Care of site: Keep the bandage on for 24 hours. Then you may remove the bandage and shower. You may put on a clean band-aid or leave open to the air.    RETURN TO THE EMERGENCY ROOM FOR:    Severe abdominal pain or fullness   Light-headedness or dizziness (especially when upright or standing)   Chest pain/tightness or shortness of breath    CALL YOUR DOCTOR FOR:    A fever over 101 degrees   Increased redness, increased swelling, and/or persistent drainage/discomfort around the site    For questions, problems or concerns, contact the Radiology Department at [phone].          Additional Information About Your Visit        Care EveryWhere ID     This is your Care EveryWhere ID. This could be used by other organizations to access your [place] medical records  [id number]        Your Vitals Were     Blood Pressure Pulse Temperature Respirations Pulse Oximetry       133/66 77 97.7  F (36.5  C) (Tympanic) 16 99%        Primary Care Provider Office Phone # Fax #    Ramon Vinson [phone]      Equal Access to Services     [facility]: Hadii yeny Gallegos, waaxda luqadaha, qaybta kaalmada skylar collier. So [facility] [phone].    ATENCIÓN: Si habla español, tiene a polk disposición servicios gratuitos de asistencia lingüística. Llame al [phone].    We comply with applicable federal civil rights laws and [place]  laws. We do not discriminate on the basis of race, color, national origin, age, disability, sex, sexual orientation, or gender identity.            Thank you!     Thank you for choosing [facility] for your care. Our goal is always to provide you with excellent care. Hearing back from our patients is one way we can continue to improve our services. Please take a few minutes to complete the written survey that you may receive in the mail after you visit with us. Thank you!             Medication List: This is a list of all your medications and when to take them. Check marks below indicate your daily home schedule. Keep this list as a reference.      Medications           Morning Afternoon Evening Bedtime As Needed    ciprofloxacin 500 MG tablet   Commonly known as:  CIPRO   Take 1 tablet by mouth daily                                D 5000 5000 units Tabs   Take 50,000 Units by mouth once a week For 12 weeks.   Generic drug:  Cholecalciferol                                gabapentin 300 MG capsule   Commonly known as:  NEURONTIN   Take 300 mg by mouth 3 times daily                                GENERLAC 10 GM/15ML solution   Take 5 mLs by mouth daily   Generic drug:  lactulose                                phytonadione 5 MG tablet   Commonly known as:  MEPHYTON   Take 1 tablet by mouth daily                                pramipexole 0.25 MG tablet   Commonly known as:  MIRAPEX   Take 1 tablet by mouth daily                                propranolol 40 MG tablet   Commonly known as:  INDERAL   Take 40 mg by mouth 2 times daily                                rifaximin 550 MG Tabs tablet   Commonly known as:  XIFAXAN   Take 550 mg by mouth 2 times daily                                vitamin A 22823 UNIT capsule   Take 10,000 Units by mouth daily                                vitamin E 400 UNIT capsule   Take 400 Units by mouth daily                                zinc sulfate 220 (50 Zn) MG capsule   Commonly known  as:  ZINCATE   Take 220 mg by mouth daily

## 2018-06-12 NOTE — PROGRESS NOTES
Infusion Nursing Note:  Philipp Jefferson presents today for Albumin infusion post paracentesis with results of 5500 ml output.    Patient seen by provider today: No   present during visit today: Not Applicable.    Note: N/A.    Intravenous Access:  Peripheral IV placed.    Treatment Conditions:  Not Applicable.      Post Infusion Assessment:  Patient tolerated infusion without incident.  Blood return noted pre and post infusion.  Site patent and intact, free from redness, edema or discomfort.  No evidence of extravasations.  Access discontinued per protocol.    Discharge Plan:   Patient discharged in stable condition accompanied by: self.  Departure Mode: Ambulatory.    Brenda J. Goodell, RN

## 2018-07-16 ENCOUNTER — OFFICE VISIT (OUTPATIENT)
Dept: FAMILY MEDICINE | Facility: OTHER | Age: 63
End: 2018-07-16
Payer: COMMERCIAL

## 2018-07-16 VITALS
DIASTOLIC BLOOD PRESSURE: 72 MMHG | HEART RATE: 88 BPM | TEMPERATURE: 98.5 F | BODY MASS INDEX: 35.21 KG/M2 | WEIGHT: 231.6 LBS | SYSTOLIC BLOOD PRESSURE: 136 MMHG

## 2018-07-16 DIAGNOSIS — Z94.4 STATUS POST LIVER TRANSPLANTATION (H): ICD-10-CM

## 2018-07-16 DIAGNOSIS — Z94.4 LIVER REPLACED BY TRANSPLANT (H): Primary | ICD-10-CM

## 2018-07-16 LAB
ALBUMIN SERPL-MCNC: 3.6 G/DL (ref 3.5–5.7)
ALP SERPL-CCNC: 75 U/L (ref 34–104)
ALT SERPL W P-5'-P-CCNC: 22 U/L (ref 7–52)
ANION GAP SERPL CALCULATED.3IONS-SCNC: 9 MMOL/L (ref 3–14)
AST SERPL W P-5'-P-CCNC: 18 U/L (ref 13–39)
BILIRUB SERPL-MCNC: 1.6 MG/DL (ref 0.3–1)
BUN SERPL-MCNC: 14 MG/DL (ref 7–25)
CALCIUM SERPL-MCNC: 8.9 MG/DL (ref 8.6–10.3)
CHLORIDE SERPL-SCNC: 107 MMOL/L (ref 98–107)
CO2 SERPL-SCNC: 25 MMOL/L (ref 21–31)
CREAT SERPL-MCNC: 0.86 MG/DL (ref 0.7–1.3)
ERYTHROCYTE [DISTWIDTH] IN BLOOD BY AUTOMATED COUNT: 17.7 % (ref 10–15)
GFR SERPL CREATININE-BSD FRML MDRD: >90 ML/MIN/1.7M2
GLUCOSE SERPL-MCNC: 82 MG/DL (ref 70–105)
HCT VFR BLD AUTO: 30.9 % (ref 40–53)
HGB BLD-MCNC: 10.5 G/DL (ref 13.3–17.7)
MCH RBC QN AUTO: 31.6 PG (ref 26.5–33)
MCHC RBC AUTO-ENTMCNC: 34 G/DL (ref 31.5–36.5)
MCV RBC AUTO: 93 FL (ref 78–100)
PLATELET # BLD AUTO: 88 10E9/L (ref 150–450)
POTASSIUM SERPL-SCNC: 3.7 MMOL/L (ref 3.5–5.1)
PROT SERPL-MCNC: 5.9 G/DL (ref 6.4–8.9)
RBC # BLD AUTO: 3.32 10E12/L (ref 4.4–5.9)
SODIUM SERPL-SCNC: 141 MMOL/L (ref 134–144)
WBC # BLD AUTO: 4 10E9/L (ref 4–11)

## 2018-07-16 PROCEDURE — 80053 COMPREHEN METABOLIC PANEL: CPT

## 2018-07-16 PROCEDURE — 36415 COLL VENOUS BLD VENIPUNCTURE: CPT

## 2018-07-16 PROCEDURE — 99213 OFFICE O/P EST LOW 20 MIN: CPT | Performed by: FAMILY MEDICINE

## 2018-07-16 PROCEDURE — 85027 COMPLETE CBC AUTOMATED: CPT

## 2018-07-16 ASSESSMENT — PAIN SCALES - GENERAL: PAINLEVEL: NO PAIN (0)

## 2018-07-16 NOTE — NURSING NOTE
Patient here to have incision site checked. He had a liver transplant on June 17th at [facility]. He is unsure of any of his medications his wife sets up his medications. The is a scab in the center of incision and it is open. Laura Clarke LPN .......................7/16/2018  8:14 AM

## 2018-07-16 NOTE — MR AVS SNAPSHOT
After Visit Summary   7/16/2018    Philipp Jefferson    MRN: [id number]           Patient Information     Date Of Birth          1955        Visit Information        Provider Department      7/16/2018 8:00 AM Ole Haern MD [facility]        Today's Diagnoses     Status post liver transplantation (H)           Follow-ups after your visit        Your next 10 appointments already scheduled     Jul 23, 2018  7:40 AM CDT   LAB with GH LAB   [facility]    [address] [facility] [address]   [phone]           Please do not eat 10-12 hours before your appointment if you are coming in fasting for labs on lipids, cholesterol, or glucose (sugar). This does not apply to pregnant women. Water, hot tea and black coffee (with nothing added) are okay. Do not drink other fluids, diet soda or chew gum.            Jul 30, 2018  7:40 AM CDT   LAB with GH LAB   [facility]    [address] [facility] [address]   [phone]           Please do not eat 10-12 hours before your appointment if you are coming in fasting for labs on lipids, cholesterol, or glucose (sugar). This does not apply to pregnant women. Water, hot tea and black coffee (with nothing added) are okay. Do not drink other fluids, diet soda or chew gum.            Aug 06, 2018  7:50 AM CDT   LAB with GH LAB   [facility]    [address] [facility] [address]   [phone]           Please do not eat 10-12 hours before your appointment if you are coming in fasting for labs on lipids, cholesterol, or glucose (sugar). This does not apply to pregnant women. Water, hot tea and black coffee (with nothing added) are okay. Do not drink other fluids, diet soda or chew gum.            Aug 13, 2018  7:20 AM CDT   LAB with GH LAB    [facility]    [address] [facility] [address]   [phone]           Please do not eat 10-12 hours before your appointment if you are coming in fasting for labs on lipids, cholesterol, or glucose (sugar). This does not apply to pregnant women. Water, hot tea and black coffee (with nothing added) are okay. Do not drink other fluids, diet soda or chew gum.            Aug 20, 2018  7:40 AM CDT   LAB with GH LAB   [facility] ([facility]    [address] [facility] [address]   [phone]           Please do not eat 10-12 hours before your appointment if you are coming in fasting for labs on lipids, cholesterol, or glucose (sugar). This does not apply to pregnant women. Water, hot tea and black coffee (with nothing added) are okay. Do not drink other fluids, diet soda or chew gum.            Aug 27, 2018  7:50 AM CDT   LAB with GH LAB   [facility])    [address] [facility] [address]   [phone]           Please do not eat 10-12 hours before your appointment if you are coming in fasting for labs on lipids, cholesterol, or glucose (sugar). This does not apply to pregnant women. Water, hot tea and black coffee (with nothing added) are okay. Do not drink other fluids, diet soda or chew gum.            Sep 04, 2018  7:10 AM CDT   LAB with GH LAB   [facility]    [address] [address]   [phone]           Please do not eat 10-12 hours before your appointment if you are coming in fasting for labs on lipids, cholesterol, or glucose (sugar). This does not apply to pregnant women. Water, hot tea and black coffee (with nothing added) are okay. Do not drink other fluids, diet soda or chew gum.            Sep 10, 2018  7:50 AM CDT   LAB with  GH LAB   [facility] ([facility]    [address] [facility] [address]   [phone]           Please do not eat 10-12 hours before your appointment if you are coming in fasting for labs on lipids, cholesterol, or glucose (sugar). This does not apply to pregnant women. Water, hot tea and black coffee (with nothing added) are okay. Do not drink other fluids, diet soda or chew gum.            Sep 17, 2018  7:40 AM CDT   LAB with GH LAB   [facility] ([facility])    [address] [facility] [address]   [phone]           Please do not eat 10-12 hours before your appointment if you are coming in fasting for labs on lipids, cholesterol, or glucose (sugar). This does not apply to pregnant women. Water, hot tea and black coffee (with nothing added) are okay. Do not drink other fluids, diet soda or chew gum.            Sep 24, 2018  7:40 AM CDT   LAB with GH LAB   [facility]    [address] [facility] [address]   [phone]           Please do not eat 10-12 hours before your appointment if you are coming in fasting for labs on lipids, cholesterol, or glucose (sugar). This does not apply to pregnant women. Water, hot tea and black coffee (with nothing added) are okay. Do not drink other fluids, diet soda or chew gum.              Who to contact     If you have questions or need follow up information about today's clinic visit or your schedule please contact [facility] directly at [phone].  Normal or non-critical lab and imaging results will be communicated to you by MyChart, letter or phone within 4 business days after the clinic has received the results. If you do not hear from us within 7 days, please contact the clinic through MyChart or phone. If you have a critical or abnormal lab result, we will notify  you by phone as soon as possible.  Submit refill requests through [employer] or call your pharmacy and they will forward the refill request to us. Please allow 3 business days for your refill to be completed.          Additional Information About Your Visit        Care EveryWhere ID     This is your Care EveryWhere ID. This could be used by other organizations to access your [place] medical records  [id number]        Your Vitals Were     Pulse Temperature BMI (Body Mass Index)             88 98.5  F (36.9  C) 35.21 kg/m2          Blood Pressure from Last 3 Encounters:   07/16/18 136/72   06/12/18 117/63   06/12/18 133/66    Weight from Last 3 Encounters:   07/16/18 231 lb 9.6 oz (105.1 kg)   10/24/17 263 lb 6.4 oz (119.5 kg)   05/16/16 265 lb (120.2 kg)              Today, you had the following     No orders found for display       Primary Care Provider Office Phone # Fax #    Ramon Vinson [phone]       [address] [facility] [address]        Equal Access to Services     [facility]: Hadii yeny schaffer Soharrison, waaxda ludwight, qaybta kaalmiranda collier, skylar sheehan . So [facility] [phone].    ATENCIÓN: Si habla español, tiene a polk disposición servicios gratuitos de asistencia lingüística. Flaquita al [phone].    We comply with applicable federal civil rights laws and [place] laws. We do not discriminate on the basis of race, color, national origin, age, disability, sex, sexual orientation, or gender identity.            Thank you!     Thank you for choosing [facility]  for your care. Our goal is always to provide you with excellent care. Hearing back from our patients is one way we can continue to improve our services. Please take a few minutes to complete the written survey that you may receive in the mail after your visit with us. Thank you!             Your Updated Medication List - Protect others around you: Learn how  to safely use, store and throw away your medicines at www.disposemymeds.org.          This list is accurate as of 7/16/18 11:59 PM.  Always use your most recent med list.                   Brand Name Dispense Instructions for use Diagnosis    ciprofloxacin 500 MG tablet    CIPRO     Take 1 tablet by mouth daily        D 5000 5000 units Tabs   Generic drug:  Cholecalciferol      Take 50,000 Units by mouth once a week For 12 weeks.        gabapentin 300 MG capsule    NEURONTIN     Take 300 mg by mouth 3 times daily        GENERLAC 10 GM/15ML solution   Generic drug:  lactulose      Take 5 mLs by mouth daily        phytonadione 5 MG tablet    MEPHYTON     Take 1 tablet by mouth daily        pramipexole 0.25 MG tablet    MIRAPEX     Take 1 tablet by mouth daily        propranolol 40 MG tablet    INDERAL     Take 40 mg by mouth 2 times daily        rifaximin 550 MG Tabs tablet    XIFAXAN     Take 550 mg by mouth 2 times daily        vitamin A 93150 UNIT capsule      Take 10,000 Units by mouth daily        vitamin E 400 UNIT capsule      Take 400 Units by mouth daily        zinc sulfate 220 (50 Zn) MG capsule    ZINCATE     Take 220 mg by mouth daily

## 2018-07-18 PROBLEM — Z94.4 STATUS POST LIVER TRANSPLANTATION (H): Status: ACTIVE | Noted: 2018-07-18

## 2018-07-18 NOTE — PROGRESS NOTES
SUBJECTIVE:   Philipp Jefferson is a 62 year old male who presents to clinic today for the following health issues: Incision check  HPI Comments: Patient arrives here for incision check.  He on June 17 had a liver transplant.  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     Priority: Medium     Liver lesion 10/24/2017     Priority: Medium     Pancytopenia (H) 10/24/2017     Priority: Medium     Chondromalacia of left knee 05/16/2016     Priority: Medium     Bilateral carpal tunnel syndrome 05/12/2016     Priority: Medium     Mallet finger 12/13/2012     Priority: Medium     Insomnia 07/27/2012     Priority: Medium     Cirrhosis of liver (H) 07/26/2012     Priority: Medium     Other ascites 07/26/2012     Priority: Medium     Benign neoplasm of colon 10/15/2009     Priority: Medium     Overview:   Colonoscopy 10/15/09 Next due 2019       Past Medical History:   Diagnosis Date     Chondromalacia of left knee     5/16/2016     Nontraumatic subarachnoid hemorrhage (H)     10/03/2007,Right frontal temporal subarachnoid bleed secondary to trauma     Other specified diseases of liver (CODE)     possible abdominal mass      No Known Allergies    Review of Systems     OBJECTIVE:     /72 (BP Location: Right arm, Patient Position: Sitting)  Pulse 88  Temp 98.5  F (36.9  C)  Wt 231 lb 9.6 oz (105.1 kg)  BMI 35.21 kg/m2  Body mass index is 35.21 kg/(m^2).  Physical Exam   Constitutional: He appears well-developed.   Skin:   There is one small area where  the surgical site has broken down slightly.  Measures about a centimeter and half in length and about a half a centimeter in width.           ASSESSMENT/PLAN:         1. Status post liver transplantation (H)  Advised the patient that this is normal.  Recommended wet to dry to the small area.  Should heal up well.        Ole Hearn MD  [facility]

## 2018-07-21 ENCOUNTER — HOSPITAL ENCOUNTER (EMERGENCY)
Facility: OTHER | Age: 63
Discharge: OTHER INSTITUTION | End: 2018-07-21
Attending: EMERGENCY MEDICINE | Admitting: EMERGENCY MEDICINE
Payer: COMMERCIAL

## 2018-07-21 ENCOUNTER — APPOINTMENT (OUTPATIENT)
Dept: CT IMAGING | Facility: OTHER | Age: 63
End: 2018-07-21
Attending: EMERGENCY MEDICINE
Payer: COMMERCIAL

## 2018-07-21 VITALS
TEMPERATURE: 97.4 F | DIASTOLIC BLOOD PRESSURE: 73 MMHG | RESPIRATION RATE: 18 BRPM | HEIGHT: 68 IN | WEIGHT: 233 LBS | OXYGEN SATURATION: 96 % | BODY MASS INDEX: 35.31 KG/M2 | SYSTOLIC BLOOD PRESSURE: 150 MMHG

## 2018-07-21 DIAGNOSIS — K56.600 PARTIAL SMALL BOWEL OBSTRUCTION (H): ICD-10-CM

## 2018-07-21 DIAGNOSIS — Z94.4 LIVER REPLACED BY TRANSPLANT (H): ICD-10-CM

## 2018-07-21 LAB
ALBUMIN SERPL-MCNC: 3.6 G/DL (ref 3.5–5.7)
ALP SERPL-CCNC: 64 U/L (ref 34–104)
ALT SERPL W P-5'-P-CCNC: 17 U/L (ref 7–52)
ANION GAP SERPL CALCULATED.3IONS-SCNC: 6 MMOL/L (ref 3–14)
ANISOCYTOSIS BLD QL SMEAR: ABNORMAL
AST SERPL W P-5'-P-CCNC: 15 U/L (ref 13–39)
BASOPHILS # BLD AUTO: 0 10E9/L (ref 0–0.2)
BASOPHILS NFR BLD AUTO: 0 %
BILIRUB SERPL-MCNC: 1.9 MG/DL (ref 0.3–1)
BUN SERPL-MCNC: 14 MG/DL (ref 7–25)
CALCIUM SERPL-MCNC: 8.8 MG/DL (ref 8.6–10.3)
CHLORIDE SERPL-SCNC: 108 MMOL/L (ref 98–107)
CO2 SERPL-SCNC: 26 MMOL/L (ref 21–31)
CREAT SERPL-MCNC: 0.91 MG/DL (ref 0.7–1.3)
DIFFERENTIAL METHOD BLD: ABNORMAL
EOSINOPHIL # BLD AUTO: 0.3 10E9/L (ref 0–0.7)
EOSINOPHIL NFR BLD AUTO: 6 %
ERYTHROCYTE [DISTWIDTH] IN BLOOD BY AUTOMATED COUNT: 17.4 % (ref 10–15)
GFR SERPL CREATININE-BSD FRML MDRD: 85 ML/MIN/1.7M2
GLUCOSE SERPL-MCNC: 99 MG/DL (ref 70–105)
HCT VFR BLD AUTO: 31.9 % (ref 40–53)
HGB BLD-MCNC: 10.8 G/DL (ref 13.3–17.7)
LYMPHOCYTES # BLD AUTO: 0.5 10E9/L (ref 0.8–5.3)
LYMPHOCYTES NFR BLD AUTO: 11 %
MCH RBC QN AUTO: 31.5 PG (ref 26.5–33)
MCHC RBC AUTO-ENTMCNC: 33.9 G/DL (ref 31.5–36.5)
MCV RBC AUTO: 93 FL (ref 78–100)
MONOCYTES # BLD AUTO: 0.4 10E9/L (ref 0–1.3)
MONOCYTES NFR BLD AUTO: 10 %
NEUTROPHILS # BLD AUTO: 3 10E9/L (ref 1.6–8.3)
NEUTROPHILS NFR BLD AUTO: 72 %
NEUTS BAND # BLD AUTO: 0 10E9/L (ref 0–0.6)
NEUTS BAND NFR BLD MANUAL: 1 %
PLATELET # BLD AUTO: 61 10E9/L (ref 150–450)
PLATELET # BLD EST: ABNORMAL 10*3/UL
POIKILOCYTOSIS BLD QL SMEAR: ABNORMAL
POTASSIUM SERPL-SCNC: 4 MMOL/L (ref 3.5–5.1)
PROT SERPL-MCNC: 5.7 G/DL (ref 6.4–8.9)
RBC # BLD AUTO: 3.43 10E12/L (ref 4.4–5.9)
SODIUM SERPL-SCNC: 140 MMOL/L (ref 134–144)
WBC # BLD AUTO: 4.2 10E9/L (ref 4–11)

## 2018-07-21 PROCEDURE — 36415 COLL VENOUS BLD VENIPUNCTURE: CPT | Performed by: EMERGENCY MEDICINE

## 2018-07-21 PROCEDURE — 99285 EMERGENCY DEPT VISIT HI MDM: CPT | Mod: 25 | Performed by: EMERGENCY MEDICINE

## 2018-07-21 PROCEDURE — 96372 THER/PROPH/DIAG INJ SC/IM: CPT | Performed by: EMERGENCY MEDICINE

## 2018-07-21 PROCEDURE — 99285 EMERGENCY DEPT VISIT HI MDM: CPT | Mod: Z6 | Performed by: EMERGENCY MEDICINE

## 2018-07-21 PROCEDURE — 74177 CT ABD & PELVIS W/CONTRAST: CPT | Mod: TC

## 2018-07-21 PROCEDURE — 85025 COMPLETE CBC W/AUTO DIFF WBC: CPT | Performed by: EMERGENCY MEDICINE

## 2018-07-21 PROCEDURE — 80053 COMPREHEN METABOLIC PANEL: CPT | Performed by: EMERGENCY MEDICINE

## 2018-07-21 PROCEDURE — 96374 THER/PROPH/DIAG INJ IV PUSH: CPT | Performed by: EMERGENCY MEDICINE

## 2018-07-21 PROCEDURE — 25000125 ZZHC RX 250: Performed by: EMERGENCY MEDICINE

## 2018-07-21 PROCEDURE — 25000128 H RX IP 250 OP 636: Performed by: EMERGENCY MEDICINE

## 2018-07-21 RX ORDER — TRAMADOL HYDROCHLORIDE 50 MG/1
50 TABLET ORAL EVERY 6 HOURS PRN
Qty: 10 TABLET | Refills: 0 | Status: SHIPPED | OUTPATIENT
Start: 2018-07-21 | End: 2020-07-27

## 2018-07-21 RX ORDER — TACROLIMUS 5 MG/1
5 CAPSULE ORAL 2 TIMES DAILY
COMMUNITY
End: 2020-07-27

## 2018-07-21 RX ORDER — MULTIVITAMIN,THERAPEUTIC
1 TABLET ORAL DAILY
COMMUNITY

## 2018-07-21 RX ORDER — HYDROMORPHONE HYDROCHLORIDE 1 MG/ML
1 INJECTION, SOLUTION INTRAMUSCULAR; INTRAVENOUS; SUBCUTANEOUS ONCE
Status: COMPLETED | OUTPATIENT
Start: 2018-07-21 | End: 2018-07-21

## 2018-07-21 RX ORDER — MYCOPHENOLATE MOFETIL 500 MG/1
1000 TABLET ORAL 2 TIMES DAILY
COMMUNITY
End: 2022-03-02

## 2018-07-21 RX ORDER — HYDROMORPHONE HYDROCHLORIDE 1 MG/ML
0.5 INJECTION, SOLUTION INTRAMUSCULAR; INTRAVENOUS; SUBCUTANEOUS ONCE
Status: COMPLETED | OUTPATIENT
Start: 2018-07-21 | End: 2018-07-21

## 2018-07-21 RX ADMIN — HYDROMORPHONE HYDROCHLORIDE 0.5 MG: 1 INJECTION, SOLUTION INTRAMUSCULAR; INTRAVENOUS; SUBCUTANEOUS at 07:51

## 2018-07-21 RX ADMIN — HYDROMORPHONE HYDROCHLORIDE 1 MG: 1 INJECTION, SOLUTION INTRAMUSCULAR; INTRAVENOUS; SUBCUTANEOUS at 09:39

## 2018-07-21 RX ADMIN — IOHEXOL 100 ML: 350 INJECTION, SOLUTION INTRAVENOUS at 08:11

## 2018-07-21 ASSESSMENT — ENCOUNTER SYMPTOMS
SHORTNESS OF BREATH: 0
FEVER: 0
ARTHRALGIAS: 0
CONSTIPATION: 1
ABDOMINAL PAIN: 1
NAUSEA: 0
VOMITING: 0
DYSURIA: 0
CHEST TIGHTNESS: 0
AGITATION: 0
LIGHT-HEADEDNESS: 0
CHILLS: 0

## 2018-07-21 NOTE — ED AVS SNAPSHOT
[facility]    [address]    [place] [address]    Phone:  [phone]    Fax:  [phone]                                       Philipp Jefferson   MRN: [id number]    Department:  [facility]   Date of Visit:  7/21/2018           After Visit Summary Signature Page     I have received my discharge instructions, and my questions have been answered. I have discussed any challenges I see with this plan with the nurse or doctor.    ..........................................................................................................................................  Patient/Patient Representative Signature      ..........................................................................................................................................  Patient Representative Print Name and Relationship to Patient    ..................................................               ................................................  Date                                            Time    ..........................................................................................................................................  Reviewed by Signature/Title    ...................................................              ..............................................  Date                                                            Time

## 2018-07-21 NOTE — ED PROVIDER NOTES
History     Chief Complaint   Patient presents with     Abdominal Pain     Patient is a 62 year old male presenting with abdominal pain. The history is provided by the patient and the spouse.   Abdominal Pain   Associated symptoms: constipation    Associated symptoms: no chest pain, no chills, no dysuria, no fever, no nausea, no shortness of breath and no vomiting      Philippmarisabel Jefferson is a 62 year old male who had a liver transplant done in the middle of June. he has been doing quite well with this, however after dinner last night he started having some abdominal pain and cramping. He had a very small bowel movement yesterday. Since then he has not had any more fall moments and does not believe he is passing any gas. He describes the pain as crampy. It is across his mid to lower abdomen, not worse one side more than the other. It does radiate into his back. He takes a single oxycodone daily, but that is not helping right now. He called the [place] transplant team and they suggested that he come in for evaluation, they are concerned about possible obstruction.     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     Priority: Medium     Liver lesion 10/24/2017     Priority: Medium     Pancytopenia (H) 10/24/2017     Priority: Medium     Chondromalacia of left knee 05/16/2016     Priority: Medium     Bilateral carpal tunnel syndrome 05/12/2016     Priority: Medium     Mallet finger 12/13/2012     Priority: Medium     Insomnia 07/27/2012     Priority: Medium     Cirrhosis of liver (H) 07/26/2012      Priority: Medium     Other ascites 07/26/2012     Priority: Medium     Benign neoplasm of colon 10/15/2009     Priority: Medium     Overview:   Colonoscopy 10/15/09 Next due 2019          Past Medical History:    Past Medical History:   Diagnosis Date     Chondromalacia of left knee      Nontraumatic subarachnoid hemorrhage (H)      Other specified diseases of liver (CODE)        Past Surgical History:    Past Surgical History:   Procedure Laterality Date     EXCISE CYST GENERIC (LOCATION)      Under his right eyelid ~10/04/07Craniotomy for subarachnoid bleed at [facility]     OTHER SURGICAL HISTORY      10/04/07,[id number],CR[facility]OTOMY[facility]       Family History:    No family history on file.    Social History:  Marital Status:   [2]  Social History   Substance Use Topics     Smoking status: Former Smoker     Packs/day: 0.50     Types: Cigarettes     Smokeless tobacco: Never Used      Comment: Quit smoking: on patch- in  process of quiting     Alcohol use No        Medications:      AmLODIPine Besylate (NORVASC PO)   ASPIRIN PO   gabapentin (NEURONTIN) 300 MG capsule   multivitamin, therapeutic (THERA-VIT) TABS tablet   mycophenolate (GENERIC EQUIVALENT) 500 MG tablet   PANTOPRAZOLE SODIUM PO   pramipexole (MIRAPEX) 0.25 MG tablet   PREDNISONE PO   tacrolimus (GENERIC EQUIVALENT) 5 MG capsule         Review of Systems   Constitutional: Negative for chills and fever.   HENT: Negative for congestion.    Eyes: Negative for visual disturbance.   Respiratory: Negative for chest tightness and shortness of breath.    Cardiovascular: Negative for chest pain.   Gastrointestinal: Positive for abdominal pain and constipation. Negative for nausea and vomiting.   Genitourinary: Negative for dysuria.   Musculoskeletal: Negative for arthralgias.   Skin: Negative for rash.   Neurological: Negative for light-headedness.   Psychiatric/Behavioral: Negative for  "agitation.       Physical Exam   BP: 152/82  Heart Rate: 87  Temp: 97.4  F (36.3  C)  Resp: 18  Height: 172.7 cm (5' 8\")  Weight: 105.7 kg (233 lb)  SpO2: 95 %      Physical Exam   Constitutional: He appears well-developed and well-nourished. No distress.   HENT:   Head: Normocephalic and atraumatic.   Eyes: Conjunctivae are normal.   Neck: Neck supple.   Cardiovascular: Normal rate, regular rhythm and normal heart sounds.    Pulmonary/Chest: Effort normal and breath sounds normal. No respiratory distress.   Abdominal: Soft. Bowel sounds are decreased.   Abdomen is very distended. He has very decreased and tympanic sounding bowel sounds. He is diffusely tender, is definite tender right upper quadrant and across the mid to lower abdomen. Surgical incisions look good. He also has some left-sided CVA tenderness.   Skin: He is not diaphoretic.   Nursing note and vitals reviewed.      ED Course     ED Course     Procedures  0724  we will check some labs including liver function tests. We'll obtain CT scan of the abdomen and treat his pain with some IM Dilaudid.                Results for orders placed or performed during the hospital encounter of 07/21/18 (from the past 24 hour(s))   CBC with platelets differential   Result Value Ref Range    WBC 4.2 4.0 - 11.0 10e9/L    RBC Count 3.43 (L) 4.4 - 5.9 10e12/L    Hemoglobin 10.8 (L) 13.3 - 17.7 g/dL    Hematocrit 31.9 (L) 40.0 - 53.0 %    MCV 93 78 - 100 fl    MCH 31.5 26.5 - 33.0 pg    MCHC 33.9 31.5 - 36.5 g/dL    RDW 17.4 (H) 10.0 - 15.0 %    Platelet Count 61 (L) 150 - 450 10e9/L    Diff Method Manual Differential     % Neutrophils 72.0 %    % Lymphocytes 11.0 %    % Monocytes 10.0 %    % Eosinophils 6.0 %    % Basophils 0.0 %    Absolute Neutrophil 3.0 1.6 - 8.3 10e9/L    Absolute Lymphocytes 0.5 (L) 0.8 - 5.3 10e9/L    Absolute Monocytes 0.4 0.0 - 1.3 10e9/L    Absolute Eosinophils 0.3 0.0 - 0.7 10e9/L    Absolute Basophils 0.0 0.0 - 0.2 10e9/L    Absolute Bands 0.0 " 0.0 - 0.6 10e9/L    Anisocytosis 2+     Platelet Estimate DECREASED, AGREES WITH INSTRUMENT     Poikilocytosis 1+     % Band 1.0 %   Comprehensive metabolic panel   Result Value Ref Range    Sodium 140 134 - 144 mmol/L    Potassium 4.0 3.5 - 5.1 mmol/L    Chloride 108 (H) 98 - 107 mmol/L    Carbon Dioxide 26 21 - 31 mmol/L    Anion Gap 6 3 - 14 mmol/L    Glucose 99 70 - 105 mg/dL    Urea Nitrogen 14 7 - 25 mg/dL    Creatinine 0.91 0.70 - 1.30 mg/dL    GFR Estimate 85 >60 mL/min/1.7m2    GFR Estimate If Black >90 >60 mL/min/1.7m2    Calcium 8.8 8.6 - 10.3 mg/dL    Bilirubin Total 1.9 (H) 0.3 - 1.0 mg/dL    Albumin 3.6 3.5 - 5.7 g/dL    Protein Total 5.7 (L) 6.4 - 8.9 g/dL    Alkaline Phosphatase 64 34 - 104 U/L    ALT 17 7 - 52 U/L    AST 15 13 - 39 U/L   CT Abdomen Pelvis w Contrast    Narrative    EXAM:    CT Abdomen and Pelvis With Intravenous Contrast    EXAM DATE/TIME:    7/21/2018 8:01 AM    CLINICAL HISTORY:    62 years  male; Signs and symptoms; Other: ? Obstruction; Prior surgery;   Surgery date: 1-6 months; Surgery type: Liver transplant; Additional info:   Recent liver transplant, ? obstruction    TECHNIQUE:    Axial computed tomography images of the abdomen and pelvis with intravenous   contrast.  All CT scans at this facility use at least one of these dose   optimization techniques: automated exposure control; mA and/or kV adjustment   per patient size (includes targeted exams where dose is matched to clinical   indication); or iterative reconstruction.    Coronal and sagittal reformatted images were created and reviewed.    CONTRAST:    100 mL of omnipaque 350 administered intravenously.      COMPARISON:    No relevant prior studies available.    FINDINGS:    Lung bases:  Compressive atelectasis or pneumonia in the right lung base.    Pleural space:  Small to moderate right pleural effusion.     ABDOMEN:    Liver:  Unremarkable.  No mass.    Gallbladder and bile ducts:  Prior cholecystectomy.  No ductal  dilation.    Pancreas:  Unremarkable.  No mass.  No ductal dilation.    Spleen:  Unremarkable.  No splenomegaly.    Adrenals:  Unremarkable.  No mass.    Kidneys and ureters:  Nonobstructing left nephrolithiasis.  Right kidney   unremarkable.    Stomach and bowel:  Mild dilated loops of small bowel, with air-fluid levels,   maximum small bowel caliber 4 cm.  Transition zone not precisely identified.    Gas and stool is noted within the colon.  No mucosal thickening.     PELVIS:    Appendix:  No findings to suggest acute appendicitis.    Bladder:  Unremarkable.  No mass.    Reproductive:  Unremarkable as visualized.     ABDOMEN and PELVIS:    Intraperitoneal space:  Small abdominal and pelvic free fluid.  No free air.    Bones/joints:  Degenerative changes.  No acute fracture.  No dislocation.    Soft tissues:  Subcutaneous induration and edema in the anterior abdominal   wall.  At the periumbilical region there is 4 cm subcutaneous fluid collection   which could represent hematoma or seroma.    Vasculature:  Unremarkable.  No abdominal aortic aneurysm.    Lymph nodes:  Borderline enlarged retroperitoneal lymph nodes extending to   the chuck caval station.      Impression    IMPRESSION:           Small to moderate right pleural effusion.      Compressive atelectasis or pneumonia in the right lung base.      Small abdominal and pelvic free fluid.      Borderline enlarged retroperitoneal lymph nodes extending to the chuck caval   station.      Findings suggestive of low-grade partial or intermittent small bowel   obstruction.      Nonobstructing left nephrolithiasis.      Subcutaneous induration and edema in the anterior abdominal wall.  At the   periumbilical region there is 4 cm subcutaneous fluid collection which could   represent hematoma or seroma.    THIS DOCUMENT HAS BEEN ELECTRONICALLY SIGNED BY LISETTE WESLEY MD       Medications   HYDROmorphone (PF) (DILAUDID) injection 0.5 mg (0.5 mg Intramuscular Given  7/21/18 0751)   iohexol (OMNIPAQUE) 350 mg/mL solution 100 mL (100 mLs Intravenous Given 7/21/18 0811)   HYDROmorphone (PF) (DILAUDID) injection 1 mg (1 mg Intravenous Given 7/21/18 2923)       Assessments & Plan (with Medical Decision Making)     I have reviewed the nursing notes.    I have reviewed the findings, diagnosis, plan and need for follow up with the patient.  Patient does appear to have partial small bowel obstruction. On exam he is somewhat distended and has very minimal bowel sounds and is quite tender. Labs are quite reassuring. Liver related tests are stable. I called and spoke with Dr. Ni at [facility] on the transplant team. We were able to send copies of the CT scan to her and she was able to review this along with the transplant surgeon on-call. They felt that they may see a transition point and felt that it would be safest to have the patient go down to [facility] for admission and observation. As the patient is stable, they felt going by private car might be reasonable if I saw no reason why he couldn't go. The patient has been quite comfortable here. His vital signs and his labs are all reassuring and stable. I gave the patient the option and they would prefer to go by private car. I will give him some tramadol that he could take for pain if it starts to get worse.  New Prescriptions    No medications on file       Final diagnoses:   Partial small bowel obstruction   Liver replaced by transplant (H)       7/21/2018   [facility] [place] AND \[facility]\""     Kervin Zhong MD  07/21/18 6119

## 2018-07-21 NOTE — ED TRIAGE NOTES
Patient has been having abdominal cramping and pain since last night. Having bowel movements, but very small ones. No nausea or vomiting. Had liver transplant 6/17/18 at [facility]. Called there and was advised to come in to be checked out.

## 2018-07-21 NOTE — ED AVS SNAPSHOT
[facility]    [address] [facility] [address]    Phone:  [phone]    Fax:  [phone]                                       Philipp Jefferson   MRN: [id number]    Department:  [facility]   Date of Visit:  7/21/2018           Patient Information     Date Of Birth          1955        Your diagnoses for this visit were:     Partial small bowel obstruction     Liver replaced by transplant (H)        You were seen by Kervin Zhong MD.      Your next 10 appointments already scheduled     Jul 23, 2018  7:40 AM CDT   LAB with GH LAB   [facility] ([facility])    [address] [facility] [address] [phone]           Please do not eat 10-12 hours before your appointment if you are coming in fasting for labs on lipids, cholesterol, or glucose (sugar). This does not apply to pregnant women. Water, hot tea and black coffee (with nothing added) are okay. Do not drink other fluids, diet soda or chew gum.            Jul 30, 2018  7:40 AM CDT   LAB with GH LAB   [facility] ([facility])    [address] [facility] [address] [phone]           Please do not eat 10-12 hours before your appointment if you are coming in fasting for labs on lipids, cholesterol, or glucose (sugar). This does not apply to pregnant women. Water, hot tea and black coffee (with nothing added) are okay. Do not drink other fluids, diet soda or chew gum.            Aug 06, 2018  7:50 AM CDT   LAB with GH LAB   [facility] ([facility])    [address] [id number] [phone]           Please do not eat 10-12 hours before your appointment if you are coming in fasting for labs on lipids, cholesterol, or glucose (sugar). This does not apply to pregnant women. Water, hot tea and black coffee (with nothing  added) are okay. Do not drink other fluids, diet soda or chew gum.            Aug 13, 2018  7:20 AM CDT   LAB with GH LAB   [facility] ([facility])    [address] [facility] [address]   [phone]           Please do not eat 10-12 hours before your appointment if you are coming in fasting for labs on lipids, cholesterol, or glucose (sugar). This does not apply to pregnant women. Water, hot tea and black coffee (with nothing added) are okay. Do not drink other fluids, diet soda or chew gum.            Aug 20, 2018  7:40 AM CDT   LAB with GH LAB   [facility] ([facility])    [address] [facility] [address]   [phone]           Please do not eat 10-12 hours before your appointment if you are coming in fasting for labs on lipids, cholesterol, or glucose (sugar). This does not apply to pregnant women. Water, hot tea and black coffee (with nothing added) are okay. Do not drink other fluids, diet soda or chew gum.            Aug 27, 2018  7:50 AM CDT   LAB with GH LAB   [facility] ([facility])    [address] [facility] [address]   [phone]           Please do not eat 10-12 hours before your appointment if you are coming in fasting for labs on lipids, cholesterol, or glucose (sugar). This does not apply to pregnant women. Water, hot tea and black coffee (with nothing added) are okay. Do not drink other fluids, diet soda or chew gum.            Sep 04, 2018  7:10 AM CDT   LAB with GH LAB   [facility] ([facility])    [address] [facility] [address]   [phone]           Please do not eat 10-12 hours before your appointment if you are coming in fasting for labs on lipids, cholesterol, or glucose (sugar). This does not apply to pregnant women. Water, hot tea and black coffee (with  nothing added) are okay. Do not drink other fluids, diet soda or chew gum.            Sep 10, 2018  7:50 AM CDT   LAB with GH LAB   [facility] ([facility])    [address] [facility] [address]   [phone]           Please do not eat 10-12 hours before your appointment if you are coming in fasting for labs on lipids, cholesterol, or glucose (sugar). This does not apply to pregnant women. Water, hot tea and black coffee (with nothing added) are okay. Do not drink other fluids, diet soda or chew gum.            Sep 17, 2018  7:40 AM CDT   LAB with GH LAB   [facility] ([facility])    [address] [facility] [address]   [phone]           Please do not eat 10-12 hours before your appointment if you are coming in fasting for labs on lipids, cholesterol, or glucose (sugar). This does not apply to pregnant women. Water, hot tea and black coffee (with nothing added) are okay. Do not drink other fluids, diet soda or chew gum.            Sep 24, 2018  7:40 AM CDT   LAB with GH LAB   [facility] ([facility])    [address] [facility] [address]   [phone]           Please do not eat 10-12 hours before your appointment if you are coming in fasting for labs on lipids, cholesterol, or glucose (sugar). This does not apply to pregnant women. Water, hot tea and black coffee (with nothing added) are okay. Do not drink other fluids, diet soda or chew gum.              24 Hour Appointment Hotline     To schedule an appointment at [facility], please call [phone]. If you don't have a family doctor or clinic, we will help you find one. [place] clinics are conveniently located to serve the needs of you and your family.           Review of your medicines      START taking        Dose / Directions Last dose taken    traMADol 50 MG tablet   Commonly known  as:  ULTRAM   Dose:  50 mg   Quantity:  10 tablet        Take 1 tablet (50 mg) by mouth every 6 hours as needed for severe pain   Refills:  0          Our records show that you are taking the medicines listed below. If these are incorrect, please call your family doctor or clinic.        Dose / Directions Last dose taken    ASPIRIN PO   Dose:  81 mg        Take 81 mg by mouth daily   Refills:  0        gabapentin 300 MG capsule   Commonly known as:  NEURONTIN   Dose:  300 mg        Take 300 mg by mouth 3 times daily   Refills:  0        multivitamin, therapeutic Tabs tablet   Dose:  1 tablet        Take 1 tablet by mouth daily   Refills:  0        mycophenolate 500 MG tablet   Commonly known as:  GENERIC EQUIVALENT   Dose:  1000 mg   Indication:  Liver Transplant Recipients        Take 1,000 mg by mouth 2 times daily   Refills:  0        NORVASC PO   Dose:  10 mg        Take 10 mg by mouth daily   Refills:  0        PANTOPRAZOLE SODIUM PO   Dose:  40 mg        Take 40 mg by mouth every morning (before breakfast)   Refills:  0        pramipexole 0.25 MG tablet   Commonly known as:  MIRAPEX   Dose:  1 tablet        Take 1 tablet by mouth daily   Refills:  0        PREDNISONE PO   Dose:  7.5 mg        Take 7.5 mg by mouth daily   Refills:  0        tacrolimus 5 MG capsule   Commonly known as:  GENERIC EQUIVALENT   Dose:  5 mg   Indication:  Liver Transplant Recipients        Take 5 mg by mouth 2 times daily   Refills:  0                Information about OPIOIDS     PRESCRIPTION OPIOIDS: WHAT YOU NEED TO KNOW   We gave you an opioid (narcotic) pain medicine. It is important to manage your pain, but opioids are not always the best choice. You should first try all the other options your care team gave you. Take this medicine for as short a time (and as few doses) as possible.     These medicines have risks:    DO NOT drive when on new or higher doses of pain medicine. These medicines can affect your alertness and reaction  times, and you could be arrested for driving under the influence (DUI). If you need to use opioids long-term, talk to your care team about driving.    DO NOT operate heave machinery    DO NOT do any other dangerous activities while taking these medicines.     DO NOT drink any alcohol while taking these medicines.      If the opioid prescribed includes acetaminophen, DO NOT take with any other medicines that contain acetaminophen. Read all labels carefully. Look for the word  acetaminophen  or  Tylenol.  Ask your pharmacist if you have questions or are unsure.    You can get addicted to pain medicines, especially if you have a history of addiction (chemical, alcohol or substance dependence). Talk to your care team about ways to reduce this risk.    Store your pills in a secure place, locked if possible. We will not replace any lost or stolen medicine. If you don t finish your medicine, please throw away (dispose) as directed by your pharmacist. The [place] [employer]ution [employer] has more information about safe disposal: https://www.pca.[facility].[place].us/living-green/managing-unwanted-medications.     All opioids tend to cause constipation. Drink plenty of water and eat foods that have a lot of fiber, such as fruits, vegetables, prune juice, apple juice and high-fiber cereal. Take a laxative (Miralax, milk of magnesia, Colace, Senna) if you don t move your bowels at least every other day.         Prescriptions were sent or printed at these locations (1 Prescription)                   Other Prescriptions                Printed at Department/Unit printer (1 of 1)         traMADol (ULTRAM) 50 MG tablet                Procedures and tests performed during your visit     CBC with platelets differential    CT Abdomen Pelvis w Contrast    Comprehensive metabolic panel      Orders Needing Specimen Collection     None      Pending Results     No orders found from 7/19/2018 to 7/22/2018.            Pending Culture Results     No  orders found from 7/19/2018 to 7/22/2018.            Pending Results Instructions     If you had any lab results that were not finalized at the time of your Discharge, you can call the ED Lab Result RN at [phone]. You will be contacted by this team for any positive Lab results or changes in treatment. The nurses are available 7 days a week from 10A to 6:30P.  You can leave a message 24 hours per day and they will return your call.        Thank you for choosing [facility]       Thank you for choosing [facility] for your care. Our goal is always to provide you with excellent care. Hearing back from our patients is one way we can continue to improve our services. Please take a few minutes to complete the written survey that you may receive in the mail after you visit with us. Thank you!        Care EveryWhere ID     This is your Care EveryWhere ID. This could be used by other organizations to access your [facility] medical records  [id number]        Equal Access to Services     JULIANNA BROTHERS : Saurav Gallegos, mc bradshaw, mayur collier, skylar sheehan . So [facility] [phone].    ATENCIÓN: Si habla español, tiene a polk disposición servicios gratuitos de asistencia lingüística. Llame al [phone].    We comply with applicable federal civil rights laws and [place] laws. We do not discriminate on the basis of race, color, national origin, age, disability, sex, sexual orientation, or gender identity.            After Visit Summary       This is your record. Keep this with you and show to your community pharmacist(s) and doctor(s) at your next visit.

## 2018-07-21 NOTE — ED AVS SNAPSHOT
Philipp Jefferson #[id number] (CSN: [id number])  (62 year old M)  (Adm: 18)     [id number]               [facility]: [phone]            Patient Demographics     Patient Name Sex          Age SSN Address Phone    Philipp Jefferson Male 1955 (62 year old) [id number]   [place] [address] [phone] (Home) *Preferred*  [phone] (Mobile)      PCP and Center    Primary Care Provider  Phone [place]     Ramon Vinson [phone] [address], [place] [address]        Emergency Contact(s)     Name Relation Home Work Mobile    Soni Jefferson [phone]      Nitesh Jefferson Brother [phone]        Admission Information     Attending Provider Admitting Provider Admission Type Admission Date/Time    Kevrin Zhong MD  Emergency 18  0656    Discharge Date Hospital Service Auth/Cert Status Service Area     Emergency Medicine [facility]    Unit Room/Bed Admission Status        EMERGENCY DEPT ED07/ED07 Admission (Confirmed)       Admission     Complaint    None      Hospital Account     Name Acct ID Class Status Primary Coverage    Philipp Jefferson [id number] Emergency Open BCBS - [place]            Guarantor Account (for Hospital Account #[id number])     Name Relation to Pt Service Area Active? Acct Type    Philipp Jefferson Self FCS Yes Personal/Family    Address Phone          [address] [address] [address] [phone](H)              Coverage Information (for Hospital Account #[id number])     1. [place]     F/O Payor/Plan Precert #    [place]     Subscriber Subscriber #    Johanny Jefferson [id number]    Address Phone    [address] [address]  [place] [address] [phone]          2. MEDICARE/MEDICARE     F/O Payor/Plan Precert #    MEDICARE/MEDICARE     Subscriber Subscriber #    Philipp Jefferson [id number]    Address Phone    ATT[employer] CLAIMS  [address]  [place] [address]  "[phone]                                                      INTERAGENCY TRANSFER FORM - PHYSICIAN ORDERS   7/21/2018                       [facility]: [phone]            Attending Provider: Kervin Zhong MD     Allergies:  No Known Allergies    Infection:  None   Service:  EMERGENCY ME    Ht:  1.727 m (5' 8\")   Wt:  105.7 kg (233 lb)   Admission Wt:  105.7 kg (233 lb)    BMI:  35.43 kg/m 2   BSA:  2.25 m 2            ED Clinical Impression     Diagnosis Description Comment Added By Time Added    Partial small bowel obstruction [K56.600] Partial small bowel obstruction [K56.600]  Kervin Zhong MD 7/21/2018  1:25 PM    Liver replaced by transplant (H) [Z94.4] Liver replaced by transplant (H) [Z94.4]  Kervin Zhong MD 7/21/2018  1:26 PM      Hospital Problems as of 7/21/2018     None      Non-Hospital Problems as of 7/21/2018              Priority Class Noted    Benign neoplasm of colon Medium  10/15/2009    Cirrhosis of liver (H) Medium  7/26/2012    Other ascites Medium  7/26/2012    Insomnia Medium  7/27/2012    Mallet finger Medium  12/13/2012    Bilateral carpal tunnel syndrome Medium  5/12/2016    Chondromalacia of left knee Medium  5/16/2016    Bilateral primary osteoarthritis of knee Medium  10/24/2017    High alpha fetoprotein (AFP) tumor marker Medium  10/24/2017    Liver lesion Medium  10/24/2017    Pancytopenia (H) Medium  10/24/2017    History of hepatitis C Medium  2/7/2018    Hypertension Medium  2/7/2018    Nicotine addiction Medium  2/7/2018    Status post liver transplantation (H) Medium  7/18/2018      Code Status History     This patient does not have a recorded code status. Please follow your organizational policy for patients in this situation.      Current Code Status     This patient does not have a recorded code status. Please follow your organizational policy for patients in this situation.         Medication Review      UNREVIEWED medications. Ask your " doctor about these medications        Dose / Directions Comments    ASPIRIN PO        Dose:  81 mg   Take 81 mg by mouth daily   Refills:  0        gabapentin 300 MG capsule   Commonly known as:  NEURONTIN        Dose:  300 mg   Take 300 mg by mouth 3 times daily   Refills:  0        multivitamin, therapeutic Tabs tablet        Dose:  1 tablet   Take 1 tablet by mouth daily   Refills:  0        mycophenolate 500 MG tablet   Commonly known as:  GENERIC EQUIVALENT   Indication:  Liver Transplant Recipients        Dose:  1000 mg   Take 1,000 mg by mouth 2 times daily   Refills:  0        NORVASC PO        Dose:  10 mg   Take 10 mg by mouth daily   Refills:  0        PANTOPRAZOLE SODIUM PO        Dose:  40 mg   Take 40 mg by mouth every morning (before breakfast)   Refills:  0        pramipexole 0.25 MG tablet   Commonly known as:  MIRAPEX        Dose:  1 tablet   Take 1 tablet by mouth daily   Refills:  0        PREDNISONE PO        Dose:  7.5 mg   Take 7.5 mg by mouth daily   Refills:  0        tacrolimus 5 MG capsule   Commonly known as:  GENERIC EQUIVALENT   Indication:  Liver Transplant Recipients        Dose:  5 mg   Take 5 mg by mouth 2 times daily   Refills:  0          START taking        Dose / Directions Comments    traMADol 50 MG tablet   Commonly known as:  ULTRAM        Dose:  50 mg   Take 1 tablet (50 mg) by mouth every 6 hours as needed for severe pain   Quantity:  10 tablet   Refills:  0                Your next 10 appointments already scheduled     Jul 23, 2018  7:40 AM CDT   LAB with  LAB   [facility] ([facility])    [address] [facility] [address]   [phone]           Please do not eat 10-12 hours before your appointment if you are coming in fasting for labs on lipids, cholesterol, or glucose (sugar). This does not apply to pregnant women. Water, hot tea and black coffee (with nothing added) are okay. Do not drink other fluids, diet  soda or chew gum.            Jul 30, 2018  7:40 AM CDT   LAB with GH LAB   [facility] ([facility])    [address] [facility] [address]   [phone]           Please do not eat 10-12 hours before your appointment if you are coming in fasting for labs on lipids, cholesterol, or glucose (sugar). This does not apply to pregnant women. Water, hot tea and black coffee (with nothing added) are okay. Do not drink other fluids, diet soda or chew gum.            Aug 06, 2018  7:50 AM CDT   LAB with GH LAB   [facility] ([facility])    [address] [facility] [address]   [phone]           Please do not eat 10-12 hours before your appointment if you are coming in fasting for labs on lipids, cholesterol, or glucose (sugar). This does not apply to pregnant women. Water, hot tea and black coffee (with nothing added) are okay. Do not drink other fluids, diet soda or chew gum.            Aug 13, 2018  7:20 AM CDT   LAB with GH LAB   [facility] ([facility])    [address] [facility] [address]   [phone]           Please do not eat 10-12 hours before your appointment if you are coming in fasting for labs on lipids, cholesterol, or glucose (sugar). This does not apply to pregnant women. Water, hot tea and black coffee (with nothing added) are okay. Do not drink other fluids, diet soda or chew gum.            Aug 20, 2018  7:40 AM CDT   LAB with GH LAB   [facility] ([facility])    [address] [facility] [address]   [phone]           Please do not eat 10-12 hours before your appointment if you are coming in fasting for labs on lipids, cholesterol, or glucose (sugar). This does not apply to pregnant women. Water, hot tea and black coffee (with nothing added) are okay. Do not drink other  fluids, diet soda or chew gum.            Aug 27, 2018  7:50 AM CDT   LAB with GH LAB   [facility]    [address] [facility] [address]   [phone]           Please do not eat 10-12 hours before your appointment if you are coming in fasting for labs on lipids, cholesterol, or glucose (sugar). This does not apply to pregnant women. Water, hot tea and black coffee (with nothing added) are okay. Do not drink other fluids, diet soda or chew gum.            Sep 04, 2018  7:10 AM CDT   LAB with GH LAB   [facility] ([facility])    [address] [facility] [address]   [phone]           Please do not eat 10-12 hours before your appointment if you are coming in fasting for labs on lipids, cholesterol, or glucose (sugar). This does not apply to pregnant women. Water, hot tea and black coffee (with nothing added) are okay. Do not drink other fluids, diet soda or chew gum.            Sep 10, 2018  7:50 AM CDT   LAB with GH LAB   [facility] ([facility])    [address] [facility] [address]   [phone]           Please do not eat 10-12 hours before your appointment if you are coming in fasting for labs on lipids, cholesterol, or glucose (sugar). This does not apply to pregnant women. Water, hot tea and black coffee (with nothing added) are okay. Do not drink other fluids, diet soda or chew gum.            Sep 17, 2018  7:40 AM CDT   LAB with GH LAB   [facility] ([facility])    [address] [facility] [address]   [phone]           Please do not eat 10-12 hours before your appointment if you are coming in fasting for labs on lipids, cholesterol, or glucose (sugar). This does not apply to pregnant women. Water, hot tea and black coffee (with nothing added) are okay. Do not drink  "other fluids, diet soda or chew gum.            Sep 24, 2018  7:40 AM CDT   LAB with GH LAB   [facility])    [address]  [place] [address] [phone]           Please do not eat 10-12 hours before your appointment if you are coming in fasting for labs on lipids, cholesterol, or glucose (sugar). This does not apply to pregnant women. Water, hot tea and black coffee (with nothing added) are okay. Do not drink other fluids, diet soda or chew gum.                                                  INTERAGENCY TRANSFER FORM - NURSING   7/21/2018                       [facility]: [phone]            Attending Provider: Kervin Zhong MD     Allergies:  No Known Allergies    Infection:  None   Service:  EMERGENCY ME    Ht:  1.727 m (5' 8\")   Wt:  105.7 kg (233 lb)   Admission Wt:  105.7 kg (233 lb)    BMI:  35.43 kg/m 2   BSA:  2.25 m 2            Advance Directives        Scanned docmt in ACP Activity?           No scanned doc        Immunizations     None      ASSESSMENT     Discharge Profile Flowsheet     GASTROINTESTINAL (ADULT,PEDIATRIC,OB)     GI Signs/Symptoms  abdominal pain;constipation 07/21/18 0721    GI WDL  ex;appearance/characteristics;GI symptoms;stool 07/21/18 0721   COMMUNICATION ASSESSMENT      Abdominal Appearance  distended;rounded 07/21/18 0721   Patient's communication style  spoken language (English or Bilingual) 07/21/18 0656    All Quadrants Bowel Sounds  audible and active in all quadrants 07/21/18 0721   SKIN      All Quadrants Palpation  firm 07/21/18 0721   Skin WDL  WDL 07/21/18 0721    Last Bowel Movement  07/20/18 07/21/18 0721                      Assessment WDL (Within Defined Limits) Definitions           Skin WDL     Effective: 09/28/15    Row Information: <b>WDL Definition:</b> Warm; dry; intact; elastic; without discoloration; pressure points without redness<br> <font " "color=\"gray\"><i>Item=AS skin wdl>>List=AS skin wdl>>Version=F14</i></font>      Vitals     Vital Signs Flowsheet     VITAL SIGNS     0-10 Pain Scale  2 07/21/18 1215    Temp  97.4  F (36.3  C) 07/21/18 0704   HEIGHT AND WEIGHT      Temp src  Tympanic 07/21/18 0704   Height  1.727 m (5' 8\") 07/21/18 0704    Resp  18 07/21/18 0704   Height Method  Stated 07/21/18 0704    Heart Rate  87 07/21/18 0704   Weight  105.7 kg (233 lb) 07/21/18 0704    Pulse/Heart Rate Source  Monitor 07/21/18 0704   BSA (Calculated - sq m)  2.25 07/21/18 0704    BP  150/73 07/21/18 1215   BMI (Calculated)  35.5 07/21/18 0704    OXYGEN THERAPY     SIRIA COMA SCALE      SpO2  96 % 07/21/18 1215   Best Eye Response  4-->(E4) spontaneous 07/21/18 0719    O2 Device  None (Room air) 07/21/18 0704   Best Motor Response  6-->(M6) obeys commands 07/21/18 0719    PAIN/COMFORT     Best Verbal Response  5-->(V5) oriented 07/21/18 0719    Patient's Stated Pain Goal  No pain 07/21/18 0704   Siria Coma Scale Score  15 07/21/18 0719            Patient Lines/Drains/Airways Status    Active LINES/DRAINS/AIRWAYS     Name: Placement date: Placement time: Site: Days: Last dressing change:    Peripheral IV 07/21/18 Left Hand 07/21/18 0757   Hand   less than 1             Patient Lines/Drains/Airways Status    Active PICC/CVC     None            Intake/Output Detail Report     None      Case Management/Discharge Planning     Case Management/Discharge Planning Flowsheet     ABUSE RISK SCREEN     OBSERVATION: Is there reason to believe there has been maltreatment of a vulnerable adult (ie. Physical/Sexual/Emotional abuse, self neglect, lack of adequate food, shelter, medical care, or financial exploitation)?  no 07/21/18 0659    QUESTION TO PATIENT:  Has a member of your family or a partner(now or in the past) intimidated, hurt, manipulated, or controlled you in any way?  no 07/21/18 0659   HOMICIDE RISK      QUESTION TO PATIENT: Do you feel safe going back to " the place where you are living?  yes 18 0659   Feels Like Hurting Others  no 18 0659                  [facility]: [phone]            Medication Administration Report for Philipp Jefferson as of 18 1335   Legend:    Given Hold Not Given Due Canceled Entry Other Actions    Time Time (Time) Time  Time-Action       Inactive    Active    Linked        Medications 07/15/18 07/16/18 07/17/18 07/18/18 07/19/18 07/20/18 07/21/18   Completed Medications      Dose: 0.5 mg  Freq: ONCE Route: IM  Start: 18   End: 18 075   Admin Instructions: For ordered doses up to 4 mg give IV Push undiluted. Administer each 2mg over 2-5 minutes.    Admin. Amount: 0.5 mg  Last Admin: 18 075  Dispense Loc: EMERGENCY DEPARTMENT  Administrations Remainin           0751 (0.5 mg)-Given             Dose: 1 mg  Freq: ONCE Route: IV  Start: 18   End: 18 0939   Admin Instructions: For ordered doses up to 4 mg give IV Push undiluted. Administer each 2mg over 2-5 minutes.    Admin. Amount: 1 mg  Last Admin: 18 0939  Dispense Loc: EMERGENCY DEPARTMENT  Administrations Remainin           0939 (1 mg)-Given             Dose: 100 mL  Freq: ONCE Route: IV  Start: 18 0803   End: 18 0811   Admin Instructions: GFR>90    Admin. Amount: 100 mL  Last Admin: 18 0811  Dispense Loc:  Radiology Floorstock  Administrations Remainin  Volume: 100 mL   Current Line: Peripheral IV 18 Left Hand          0811 (100 mL)-Given                    INTERAGENCY TRANSFER FORM - NOTES (H&P, Discharge Summary, Consults, Procedures, Therapies)   2018                       [facility]: [phone]            History & Physicals     No notes of this type exist for this encounter.      Discharge Summaries     No notes of this type exist for this encounter.      Consult Notes     No notes of this type exist for this encounter.          Progress Notes - Physician (Notes for yesterday and today)      ED Provider Notes by Kervin Zhong MD at 7/21/2018  6:52 AM     Author:  Kervin Zhong MD Service:  Emergency Medicine Author Type:  Physician    Filed:  7/21/2018  1:29 PM Date of Service:  7/21/2018  6:52 AM Creation Time:  7/21/2018  7:20 AM    Status:  Signed :  Kervin Zhong MD (Physician)           History[TV1.1]     Chief Complaint   Patient presents with     Abdominal Pain[TV1.2]     Patient is a 62 year old male presenting with abdominal pain. The history is provided by the patient and the spouse.   Abdominal Pain   Associated symptoms: constipation    Associated symptoms: no chest pain, no chills, no dysuria, no fever, no nausea, no shortness of breath and no vomiting      Philippmarisabel Jefferson is a 62 year old male who had a liver transplant done in the middle of June. he has been doing quite well with this, however after dinner last night he started having some abdominal pain and cramping. He had a very small bowel movement yesterday. Since then he has not had any more fall moments and does not believe he is passing any gas. He describes the pain as crampy. It is across his mid to lower abdomen, not worse one side more than the other. It does radiate into his back. He takes a single oxycodone daily, but that is not helping right now. He called the [facility] transplant team and they suggested that he come in for evaluation, they are concerned about possible obstruction.     Problem List:[TV1.1]    Patient Active Problem List    Diagnosis Date Noted     Status post liver transplantation (H) 07/18/2018     Priority: Medium     History of hepatitis C 02/07/2018     Priority: Medium     Overview:   Followed at [facility]       Hypertension 02/07/2018     Priority: Medium     Nicotine addiction 02/07/2018     Priority: Medium     Bilateral primary osteoarthritis of knee 10/24/2017     Priority: Medium     High alpha fetoprotein (AFP) tumor marker  10/24/2017     Priority: Medium     Liver lesion 10/24/2017     Priority: Medium     Pancytopenia (H) 10/24/2017     Priority: Medium     Chondromalacia of left knee 05/16/2016     Priority: Medium     Bilateral carpal tunnel syndrome 05/12/2016     Priority: Medium     Mallet finger 12/13/2012     Priority: Medium     Insomnia 07/27/2012     Priority: Medium     Cirrhosis of liver (H) 07/26/2012     Priority: Medium     Other ascites 07/26/2012     Priority: Medium     Benign neoplasm of colon 10/15/2009     Priority: Medium     Overview:   Colonoscopy 10/15/09 Next due 2019[TV1.2]          Past Medical History:[TV1.1]    Past Medical History:   Diagnosis Date     Chondromalacia of left knee      Nontraumatic subarachnoid hemorrhage (H)      Other specified diseases of liver (CODE)[TV1.2]        Past Surgical History:[TV1.1]    Past Surgical History:   Procedure Laterality Date     EXCISE CYST GENERIC (LOCATION)      Under his right eyelid ~10/04/07Craniotomy for subarachnoid bleed at [facility]     OTHER SURGICAL HISTORY      10/04/07,[id number],CR[facility]OTOMY[facility][TV1.2]       Family History:[TV1.1]    No family history on file.[TV1.2]    Social History:  Marital Status:   [2][TV1.1]  Social History   Substance Use Topics     Smoking status: Former Smoker     Packs/day: 0.50     Types: Cigarettes     Smokeless tobacco: Never Used      Comment: Quit smoking: on patch- in  process of quiting     Alcohol use No[TV1.2]        Medications:[TV1.1]      AmLODIPine Besylate (NORVASC PO)   ASPIRIN PO   gabapentin (NEURONTIN) 300 MG capsule   multivitamin, therapeutic (THERA-VIT) TABS tablet   mycophenolate (GENERIC EQUIVALENT) 500 MG tablet   PANTOPRAZOLE SODIUM PO   pramipexole (MIRAPEX) 0.25 MG tablet   PREDNISONE PO   tacrolimus (GENERIC EQUIVALENT) 5 MG capsule[TV1.2]         Review of Systems   Constitutional: Negative for chills and fever.  "  HENT: Negative for congestion.    Eyes: Negative for visual disturbance.   Respiratory: Negative for chest tightness and shortness of breath.    Cardiovascular: Negative for chest pain.   Gastrointestinal: Positive for abdominal pain and constipation. Negative for nausea and vomiting.   Genitourinary: Negative for dysuria.   Musculoskeletal: Negative for arthralgias.   Skin: Negative for rash.   Neurological: Negative for light-headedness.   Psychiatric/Behavioral: Negative for agitation.       Physical Exam[TV1.1]   BP: 152/82  Heart Rate: 87  Temp: 97.4  F (36.3  C)  Resp: 18  Height: 172.7 cm (5' 8\")  Weight: 105.7 kg (233 lb)  SpO2: 95 %[TV1.2]      Physical Exam   Constitutional: He appears well-developed and well-nourished. No distress.   HENT:   Head: Normocephalic and atraumatic.   Eyes: Conjunctivae are normal.   Neck: Neck supple.   Cardiovascular: Normal rate, regular rhythm and normal heart sounds.    Pulmonary/Chest: Effort normal and breath sounds normal. No respiratory distress.   Abdominal: Soft. Bowel sounds are decreased.   Abdomen is very distended. He has very decreased and tympanic sounding bowel sounds. He is diffusely tender, is definite tender right upper quadrant and across the mid to lower abdomen. Surgical incisions look good. He also has some left-sided CVA tenderness.   Skin: He is not diaphoretic.   Nursing note and vitals reviewed.      ED Course[TV1.1]     ED Course[TV1.2]     Procedures  0724  we will check some labs including liver function tests. We'll obtain CT scan of the abdomen and treat his pain with some IM Dilaudid.[TV1.1]                Results for orders placed or performed during the hospital encounter of 07/21/18 (from the past 24 hour(s))   CBC with platelets differential   Result Value Ref Range    WBC 4.2 4.0 - 11.0 10e9/L    RBC Count 3.43 (L) 4.4 - 5.9 10e12/L    Hemoglobin 10.8 (L) 13.3 - 17.7 g/dL    Hematocrit 31.9 (L) 40.0 - 53.0 %    MCV 93 78 - 100 fl    MCH " 31.5 26.5 - 33.0 pg    MCHC 33.9 31.5 - 36.5 g/dL    RDW 17.4 (H) 10.0 - 15.0 %    Platelet Count 61 (L) 150 - 450 10e9/L    Diff Method Manual Differential     % Neutrophils 72.0 %    % Lymphocytes 11.0 %    % Monocytes 10.0 %    % Eosinophils 6.0 %    % Basophils 0.0 %    Absolute Neutrophil 3.0 1.6 - 8.3 10e9/L    Absolute Lymphocytes 0.5 (L) 0.8 - 5.3 10e9/L    Absolute Monocytes 0.4 0.0 - 1.3 10e9/L    Absolute Eosinophils 0.3 0.0 - 0.7 10e9/L    Absolute Basophils 0.0 0.0 - 0.2 10e9/L    Absolute Bands 0.0 0.0 - 0.6 10e9/L    Anisocytosis 2+     Platelet Estimate DECREASED, AGREES WITH INSTRUMENT     Poikilocytosis 1+     % Band 1.0 %   Comprehensive metabolic panel   Result Value Ref Range    Sodium 140 134 - 144 mmol/L    Potassium 4.0 3.5 - 5.1 mmol/L    Chloride 108 (H) 98 - 107 mmol/L    Carbon Dioxide 26 21 - 31 mmol/L    Anion Gap 6 3 - 14 mmol/L    Glucose 99 70 - 105 mg/dL    Urea Nitrogen 14 7 - 25 mg/dL    Creatinine 0.91 0.70 - 1.30 mg/dL    GFR Estimate 85 >60 mL/min/1.7m2    GFR Estimate If Black >90 >60 mL/min/1.7m2    Calcium 8.8 8.6 - 10.3 mg/dL    Bilirubin Total 1.9 (H) 0.3 - 1.0 mg/dL    Albumin 3.6 3.5 - 5.7 g/dL    Protein Total 5.7 (L) 6.4 - 8.9 g/dL    Alkaline Phosphatase 64 34 - 104 U/L    ALT 17 7 - 52 U/L    AST 15 13 - 39 U/L   CT Abdomen Pelvis w Contrast    Narrative    EXAM:    CT Abdomen and Pelvis With Intravenous Contrast    EXAM DATE/TIME:    7/21/2018 8:01 AM    CLINICAL HISTORY:    62 years  male; Signs and symptoms; Other: ? Obstruction; Prior surgery;   Surgery date: 1-6 months; Surgery type: Liver transplant; Additional info:   Recent liver transplant, ? obstruction    TECHNIQUE:    Axial computed tomography images of the abdomen and pelvis with intravenous   contrast.  All CT scans at this facility use at least one of these dose   optimization techniques: automated exposure control; mA and/or kV adjustment   per patient size (includes targeted exams where dose is  matched to clinical   indication); or iterative reconstruction.    Coronal and sagittal reformatted images were created and reviewed.    CONTRAST:    100 mL of omnipaque 350 administered intravenously.      COMPARISON:    No relevant prior studies available.    FINDINGS:    Lung bases:  Compressive atelectasis or pneumonia in the right lung base.    Pleural space:  Small to moderate right pleural effusion.     ABDOMEN:    Liver:  Unremarkable.  No mass.    Gallbladder and bile ducts:  Prior cholecystectomy.  No ductal dilation.    Pancreas:  Unremarkable.  No mass.  No ductal dilation.    Spleen:  Unremarkable.  No splenomegaly.    Adrenals:  Unremarkable.  No mass.    Kidneys and ureters:  Nonobstructing left nephrolithiasis.  Right kidney   unremarkable.    Stomach and bowel:  Mild dilated loops of small bowel, with air-fluid levels,   maximum small bowel caliber 4 cm.  Transition zone not precisely identified.    Gas and stool is noted within the colon.  No mucosal thickening.     PELVIS:    Appendix:  No findings to suggest acute appendicitis.    Bladder:  Unremarkable.  No mass.    Reproductive:  Unremarkable as visualized.     ABDOMEN and PELVIS:    Intraperitoneal space:  Small abdominal and pelvic free fluid.  No free air.    Bones/joints:  Degenerative changes.  No acute fracture.  No dislocation.    Soft tissues:  Subcutaneous induration and edema in the anterior abdominal   wall.  At the periumbilical region there is 4 cm subcutaneous fluid collection   which could represent hematoma or seroma.    Vasculature:  Unremarkable.  No abdominal aortic aneurysm.    Lymph nodes:  Borderline enlarged retroperitoneal lymph nodes extending to   the chuck caval station.      Impression    IMPRESSION:           Small to moderate right pleural effusion.      Compressive atelectasis or pneumonia in the right lung base.      Small abdominal and pelvic free fluid.      Borderline enlarged retroperitoneal lymph nodes  extending to the chuck caval   station.      Findings suggestive of low-grade partial or intermittent small bowel   obstruction.      Nonobstructing left nephrolithiasis.      Subcutaneous induration and edema in the anterior abdominal wall.  At the   periumbilical region there is 4 cm subcutaneous fluid collection which could   represent hematoma or seroma.    THIS DOCUMENT HAS BEEN ELECTRONICALLY SIGNED BY LISETTE WESLEY MD       Medications   HYDROmorphone (PF) (DILAUDID) injection 0.5 mg (0.5 mg Intramuscular Given 7/21/18 0792)   iohexol (OMNIPAQUE) 350 mg/mL solution 100 mL (100 mLs Intravenous Given 7/21/18 0811)   HYDROmorphone (PF) (DILAUDID) injection 1 mg (1 mg Intravenous Given 7/21/18 8725)[TV1.2]       Assessments & Plan (with Medical Decision Making)     I have reviewed the nursing notes.    I have reviewed the findings, diagnosis, plan and need for follow up with the patient.[TV1.1]  Patient does appear to have partial small bowel obstruction. On exam he is somewhat distended and has very minimal bowel sounds and is quite tender. Labs are quite reassuring. Liver related tests are stable. I called and spoke with Dr. Ni at [facility] on the transplant team. We were able to send copies of the CT scan to her and she was able to review this along with the transplant surgeon on-call. They felt that they may see a transition point and felt that it would be safest to have the patient go down to [facility] for admission and observation. As the patient is stable, they felt going by private car might be reasonable if I saw no reason why he couldn't go. The patient has been quite comfortable here. His vital signs and his labs are all reassuring and stable. I gave the patient the option and they would prefer to go by private car. I will give him some tramadol that he could take for pain if it starts to get worse.  New Prescriptions    No medications on file       Final diagnoses:   Partial small bowel  obstruction   Liver replaced by transplant (H)[TV1.2]       7/21/2018   [facility][TV1.1]     Kervin Zhong MD  07/21/18 1329  [TV1.3]     Revision History        User Key Date/Time User Provider Type Action    > TV1.3 7/21/2018  1:29 PM Kervin Zhong MD Physician Sign     TV1.2 7/21/2018  1:26 PM Kervin Zhong MD Physician      TV1.1 7/21/2018  7:20 AM Kervin Zhong MD Physician                   Procedure Notes     No notes of this type exist for this encounter.      Progress Notes - Therapies (Notes from 07/18/18 through 07/21/18)     No notes of this type exist for this encounter.                                          INTERAGENCY TRANSFER FORM - LAB / IMAGING / EKG / EMG RESULTS   7/21/2018                       [facility]: [phone]            Unresulted Labs     None         Lab Results - 3 Days      CBC with platelets differential [[id number]] (Abnormal)  Resulted: 07/21/18 0819, Result status: Final result    Ordering provider: Kervin Zhong MD  07/21/18 0720 Resulting lab: [facility]    Specimen Information    Type Source Collected On   Blood  07/21/18 0736          Components       Value Reference Range Flag Lab   WBC 4.2 4.0 - 11.0 10e9/L  [facility]   RBC Count 3.43 4.4 - 5.9 10e12/L L [facility]   Hemoglobin 10.8 13.3 - 17.7 g/dL L [facility]   Hematocrit 31.9 40.0 - 53.0 % L [facility]   MCV 93 78 - 100 fl  [facility]   MCH 31.5 26.5 - 33.0 pg  [facility]   MCHC 33.9 31.5 - 36.5 g/dL  [facility]   RDW 17.4 10.0 - 15.0 % H [facility]   Platelet Count 61 150 - 450 10e9/L L [facility]   Diff Method Manual Differential   [facility]   % Neutrophils 72.0 %  [facility]   % Lymphocytes 11.0 %  [facility]   % Monocytes 10.0 %  [facility]   % Eosinophils 6.0 %  [facility]   % Basophils 0.0 %  [facility]   Absolute Neutrophil 3.0 1.6 - 8.3 10e9/L  [facility]   Absolute Lymphocytes 0.5 0.8 - 5.3 10e9/L L [facility]   Absolute Monocytes 0.4  0.0 - 1.3 10e9/L  [facility]   Absolute Eosinophils 0.3 0.0 - 0.7 10e9/L  [facility]   Absolute Basophils 0.0 0.0 - 0.2 10e9/L  [facility]   Absolute Bands 0.0 0.0 - 0.6 10e9/L  [facility]   Anisocytosis 2+   [facility]   Platelet Estimate DECREASED, AGREES WITH INSTRUMENT   [facility]   Poikilocytosis 1+   [facility]   % Band 1.0 %  [facility]            Comprehensive metabolic panel [[id number]] (Abnormal)  Resulted: 07/21/18 0811, Result status: Final result    Ordering provider: Kervin Zhong MD  07/21/18 0720 Resulting lab: [facility]    Specimen Information    Type Source Collected On   Blood  07/21/18 0736          Components       Value Reference Range Flag Lab   Sodium 140 134 - 144 mmol/L  [facility]   Potassium 4.0 3.5 - 5.1 mmol/L  [facility]   Chloride 108 98 - 107 mmol/L H [facility]   Carbon Dioxide 26 21 - 31 mmol/L  [facility]   Anion Gap 6 3 - 14 mmol/L  [facility]   Glucose 99 70 - 105 mg/dL  [facility]   Urea Nitrogen 14 7 - 25 mg/dL  [facility]   Creatinine 0.91 0.70 - 1.30 mg/dL  [facility]   GFR Estimate 85 >60 mL/min/1.7m2  [facility]   GFR Estimate If Black >90 >60 mL/min/1.7m2  [facility]   Calcium 8.8 8.6 - 10.3 mg/dL  [facility]   Bilirubin Total 1.9 0.3 - 1.0 mg/dL H [facility]   Albumin 3.6 3.5 - 5.7 g/dL  [facility]   Protein Total 5.7 6.4 - 8.9 g/dL L [facility]   Alkaline Phosphatase 64 34 - 104 U/L  [facility]   ALT 17 7 - 52 U/L  [facility]   AST 15 13 - 39 U/L  [facility]            Testing Performed By     Lab - Abbreviation Name Director Address Valid Date Range    1743 - [facility] Unknown [address]  [place] [address] 08/07/15 0948 - Present               Imaging Results - 3 Days      CT Abdomen Pelvis w Contrast [[id number]]  Resulted: 07/21/18 0912, Result status: Final result    Ordering provider: Kervin Zhong MD  07/21/18 0720 Resulted by: LISETTE WESLEY    Performed: 07/21/18 0801 - 07/21/18 0819  Resulting lab: RADIOLOGY RESULTS    Narrative:       EXAM:    CT Abdomen and Pelvis With Intravenous Contrast    EXAM DATE/TIME:    7/21/2018 8:01 AM    CLINICAL HISTORY:    62 years  male; Signs and symptoms; Other: ? Obstruction; Prior surgery;   Surgery date: 1-6 months; Surgery type: Liver transplant; Additional info:   Recent liver transplant, ? obstruction    TECHNIQUE:    Axial computed tomography images of the abdomen and pelvis with intravenous   contrast.  All CT scans at this facility use at least one of these dose   optimization techniques: automated exposure control; mA and/or kV adjustment   per patient size (includes targeted exams where dose is matched to clinical   indication); or iterative reconstruction.    Coronal and sagittal reformatted images were created and reviewed.    CONTRAST:    100 mL of omnipaque 350 administered intravenously.      COMPARISON:    No relevant prior studies available.    FINDINGS:    Lung bases:  Compressive atelectasis or pneumonia in the right lung base.    Pleural space:  Small to moderate right pleural effusion.     ABDOMEN:    Liver:  Unremarkable.  No mass.    Gallbladder and bile ducts:  Prior cholecystectomy.  No ductal dilation.    Pancreas:  Unremarkable.  No mass.  No ductal dilation.    Spleen:  Unremarkable.  No splenomegaly.    Adrenals:  Unremarkable.  No mass.    Kidneys and ureters:  Nonobstructing left nephrolithiasis.  Right kidney   unremarkable.    Stomach and bowel:  Mild dilated loops of small bowel, with air-fluid levels,   maximum small bowel caliber 4 cm.  Transition zone not precisely identified.    Gas and stool is noted within the colon.  No mucosal thickening.     PELVIS:    Appendix:  No findings to suggest acute appendicitis.    Bladder:  Unremarkable.  No mass.    Reproductive:  Unremarkable as visualized.     ABDOMEN and PELVIS:    Intraperitoneal space:  Small abdominal and pelvic free fluid.  No free air.    Bones/joints:   Degenerative changes.  No acute fracture.  No dislocation.    Soft tissues:  Subcutaneous induration and edema in the anterior abdominal   wall.  At the periumbilical region there is 4 cm subcutaneous fluid collection   which could represent hematoma or seroma.    Vasculature:  Unremarkable.  No abdominal aortic aneurysm.    Lymph nodes:  Borderline enlarged retroperitoneal lymph nodes extending to   the chuck caval station.      Impression:       IMPRESSION:           Small to moderate right pleural effusion.      Compressive atelectasis or pneumonia in the right lung base.      Small abdominal and pelvic free fluid.      Borderline enlarged retroperitoneal lymph nodes extending to the chuck caval   station.      Findings suggestive of low-grade partial or intermittent small bowel   obstruction.      Nonobstructing left nephrolithiasis.      Subcutaneous induration and edema in the anterior abdominal wall.  At the   periumbilical region there is 4 cm subcutaneous fluid collection which could   represent hematoma or seroma.    THIS DOCUMENT HAS BEEN ELECTRONICALLY SIGNED BY LISETTE WESLEY MD    Specimen Information    Type Source Collected On     07/21/18 0801            Testing Performed By     Lab - Abbreviation Name Director Address Valid Date Range    104 - [facility] RADIOLOGY RESULTS Unknown Unknown 02/16/05 1553 - Present            Encounter-Level Documents:     There are no encounter-level documents.      Order-Level Documents:     There are no order-level documents.

## 2018-07-23 DIAGNOSIS — Z01.89 EXAMINATION FOR, LABORATORY: Primary | ICD-10-CM

## 2018-07-23 PROCEDURE — 36415 COLL VENOUS BLD VENIPUNCTURE: CPT

## 2018-07-30 DIAGNOSIS — Z94.4 LIVER REPLACED BY TRANSPLANT (H): ICD-10-CM

## 2018-07-30 LAB
ALBUMIN SERPL-MCNC: 3.6 G/DL (ref 3.5–5.7)
ALP SERPL-CCNC: 57 U/L (ref 34–104)
ALT SERPL W P-5'-P-CCNC: 17 U/L (ref 7–52)
ANION GAP SERPL CALCULATED.3IONS-SCNC: 9 MMOL/L (ref 3–14)
AST SERPL W P-5'-P-CCNC: 14 U/L (ref 13–39)
BILIRUB SERPL-MCNC: 1.9 MG/DL (ref 0.3–1)
BUN SERPL-MCNC: 16 MG/DL (ref 7–25)
CALCIUM SERPL-MCNC: 8.8 MG/DL (ref 8.6–10.3)
CHLORIDE SERPL-SCNC: 106 MMOL/L (ref 98–107)
CO2 SERPL-SCNC: 25 MMOL/L (ref 21–31)
CREAT SERPL-MCNC: 1.08 MG/DL (ref 0.7–1.3)
ERYTHROCYTE [DISTWIDTH] IN BLOOD BY AUTOMATED COUNT: 16.5 % (ref 10–15)
GFR SERPL CREATININE-BSD FRML MDRD: 70 ML/MIN/1.7M2
GLUCOSE SERPL-MCNC: 160 MG/DL (ref 70–105)
HCT VFR BLD AUTO: 30.5 % (ref 40–53)
HGB BLD-MCNC: 10.5 G/DL (ref 13.3–17.7)
MCH RBC QN AUTO: 31.5 PG (ref 26.5–33)
MCHC RBC AUTO-ENTMCNC: 34.4 G/DL (ref 31.5–36.5)
MCV RBC AUTO: 92 FL (ref 78–100)
PLATELET # BLD AUTO: 65 10E9/L (ref 150–450)
POTASSIUM SERPL-SCNC: 3.5 MMOL/L (ref 3.5–5.1)
PROT SERPL-MCNC: 5.9 G/DL (ref 6.4–8.9)
RBC # BLD AUTO: 3.33 10E12/L (ref 4.4–5.9)
SODIUM SERPL-SCNC: 140 MMOL/L (ref 134–144)
WBC # BLD AUTO: 3.8 10E9/L (ref 4–11)

## 2018-07-30 PROCEDURE — 36415 COLL VENOUS BLD VENIPUNCTURE: CPT

## 2018-07-30 PROCEDURE — 85027 COMPLETE CBC AUTOMATED: CPT

## 2018-07-30 PROCEDURE — 80053 COMPREHEN METABOLIC PANEL: CPT

## 2018-08-06 ENCOUNTER — APPOINTMENT (OUTPATIENT)
Dept: LAB | Facility: OTHER | Age: 63
End: 2018-08-06
Payer: COMMERCIAL

## 2018-08-13 DIAGNOSIS — Z01.89 LABORATORY PROCEDURE: Primary | ICD-10-CM

## 2018-08-16 DIAGNOSIS — Z94.4 LIVER REPLACED BY TRANSPLANT (H): ICD-10-CM

## 2018-08-16 LAB
ALBUMIN SERPL-MCNC: 3.7 G/DL (ref 3.5–5.7)
ALP SERPL-CCNC: 58 U/L (ref 34–104)
ALT SERPL W P-5'-P-CCNC: 14 U/L (ref 7–52)
ANION GAP SERPL CALCULATED.3IONS-SCNC: 7 MMOL/L (ref 3–14)
AST SERPL W P-5'-P-CCNC: 18 U/L (ref 13–39)
BILIRUB SERPL-MCNC: 1.8 MG/DL (ref 0.3–1)
BUN SERPL-MCNC: 14 MG/DL (ref 7–25)
CALCIUM SERPL-MCNC: 8.5 MG/DL (ref 8.6–10.3)
CHLORIDE SERPL-SCNC: 108 MMOL/L (ref 98–107)
CO2 SERPL-SCNC: 24 MMOL/L (ref 21–31)
CREAT SERPL-MCNC: 1.11 MG/DL (ref 0.7–1.3)
ERYTHROCYTE [DISTWIDTH] IN BLOOD BY AUTOMATED COUNT: 15.4 % (ref 10–15)
GFR SERPL CREATININE-BSD FRML MDRD: 67 ML/MIN/1.7M2
GLUCOSE SERPL-MCNC: 156 MG/DL (ref 70–105)
HCT VFR BLD AUTO: 29.7 % (ref 40–53)
HGB BLD-MCNC: 9.9 G/DL (ref 13.3–17.7)
MCH RBC QN AUTO: 30.6 PG (ref 26.5–33)
MCHC RBC AUTO-ENTMCNC: 33.3 G/DL (ref 31.5–36.5)
MCV RBC AUTO: 92 FL (ref 78–100)
PLATELET # BLD AUTO: 60 10E9/L (ref 150–450)
POTASSIUM SERPL-SCNC: 3.9 MMOL/L (ref 3.5–5.1)
PROT SERPL-MCNC: 5.5 G/DL (ref 6.4–8.9)
RBC # BLD AUTO: 3.24 10E12/L (ref 4.4–5.9)
SODIUM SERPL-SCNC: 139 MMOL/L (ref 134–144)
WBC # BLD AUTO: 3.2 10E9/L (ref 4–11)

## 2018-08-16 PROCEDURE — 36415 COLL VENOUS BLD VENIPUNCTURE: CPT

## 2018-08-16 PROCEDURE — 80053 COMPREHEN METABOLIC PANEL: CPT

## 2018-08-16 PROCEDURE — 85027 COMPLETE CBC AUTOMATED: CPT

## 2018-08-20 DIAGNOSIS — Z01.89 EXAMINATION FOR, LABORATORY: Primary | ICD-10-CM

## 2018-08-20 DIAGNOSIS — Z94.4 LIVER REPLACED BY TRANSPLANT (H): ICD-10-CM

## 2018-08-20 LAB
ALBUMIN SERPL-MCNC: 3.5 G/DL (ref 3.5–5.7)
ALP SERPL-CCNC: 74 U/L (ref 34–104)
ALT SERPL W P-5'-P-CCNC: 15 U/L (ref 7–52)
ANION GAP SERPL CALCULATED.3IONS-SCNC: 7 MMOL/L (ref 3–14)
AST SERPL W P-5'-P-CCNC: 16 U/L (ref 13–39)
BILIRUB SERPL-MCNC: 1.3 MG/DL (ref 0.3–1)
BUN SERPL-MCNC: 11 MG/DL (ref 7–25)
CALCIUM SERPL-MCNC: 8.5 MG/DL (ref 8.6–10.3)
CHLORIDE SERPL-SCNC: 110 MMOL/L (ref 98–107)
CO2 SERPL-SCNC: 22 MMOL/L (ref 21–31)
CREAT SERPL-MCNC: 0.97 MG/DL (ref 0.7–1.3)
ERYTHROCYTE [DISTWIDTH] IN BLOOD BY AUTOMATED COUNT: 15.9 % (ref 10–15)
GFR SERPL CREATININE-BSD FRML MDRD: 79 ML/MIN/1.7M2
GLUCOSE SERPL-MCNC: 137 MG/DL (ref 70–105)
HCT VFR BLD AUTO: 30.8 % (ref 40–53)
HGB BLD-MCNC: 10.4 G/DL (ref 13.3–17.7)
MCH RBC QN AUTO: 31.2 PG (ref 26.5–33)
MCHC RBC AUTO-ENTMCNC: 33.8 G/DL (ref 31.5–36.5)
MCV RBC AUTO: 93 FL (ref 78–100)
PLATELET # BLD AUTO: 64 10E9/L (ref 150–450)
POTASSIUM SERPL-SCNC: 3.6 MMOL/L (ref 3.5–5.1)
PROT SERPL-MCNC: 5.7 G/DL (ref 6.4–8.9)
RBC # BLD AUTO: 3.33 10E12/L (ref 4.4–5.9)
SODIUM SERPL-SCNC: 139 MMOL/L (ref 134–144)
WBC # BLD AUTO: 2.7 10E9/L (ref 4–11)

## 2018-08-20 PROCEDURE — 80053 COMPREHEN METABOLIC PANEL: CPT

## 2018-08-20 PROCEDURE — 85027 COMPLETE CBC AUTOMATED: CPT

## 2018-08-20 PROCEDURE — 36415 COLL VENOUS BLD VENIPUNCTURE: CPT

## 2018-08-27 DIAGNOSIS — Z94.4 LIVER REPLACED BY TRANSPLANT (H): ICD-10-CM

## 2018-08-27 LAB
ALBUMIN SERPL-MCNC: 3.5 G/DL (ref 3.5–5.7)
ALP SERPL-CCNC: 67 U/L (ref 34–104)
ALT SERPL W P-5'-P-CCNC: 10 U/L (ref 7–52)
ANION GAP SERPL CALCULATED.3IONS-SCNC: 4 MMOL/L (ref 3–14)
AST SERPL W P-5'-P-CCNC: 17 U/L (ref 13–39)
BILIRUB SERPL-MCNC: 1 MG/DL (ref 0.3–1)
BUN SERPL-MCNC: 12 MG/DL (ref 7–25)
CALCIUM SERPL-MCNC: 8.6 MG/DL (ref 8.6–10.3)
CHLORIDE SERPL-SCNC: 109 MMOL/L (ref 98–107)
CO2 SERPL-SCNC: 28 MMOL/L (ref 21–31)
CREAT SERPL-MCNC: 0.9 MG/DL (ref 0.7–1.3)
ERYTHROCYTE [DISTWIDTH] IN BLOOD BY AUTOMATED COUNT: 15.9 % (ref 10–15)
GFR SERPL CREATININE-BSD FRML MDRD: 86 ML/MIN/1.7M2
GLUCOSE SERPL-MCNC: 121 MG/DL (ref 70–105)
HCT VFR BLD AUTO: 31.4 % (ref 40–53)
HGB BLD-MCNC: 10.2 G/DL (ref 13.3–17.7)
MCH RBC QN AUTO: 30.3 PG (ref 26.5–33)
MCHC RBC AUTO-ENTMCNC: 32.5 G/DL (ref 31.5–36.5)
MCV RBC AUTO: 93 FL (ref 78–100)
PLATELET # BLD AUTO: 58 10E9/L (ref 150–450)
POTASSIUM SERPL-SCNC: 3.7 MMOL/L (ref 3.5–5.1)
PROT SERPL-MCNC: 5.3 G/DL (ref 6.4–8.9)
RBC # BLD AUTO: 3.37 10E12/L (ref 4.4–5.9)
SODIUM SERPL-SCNC: 141 MMOL/L (ref 134–144)
WBC # BLD AUTO: 3 10E9/L (ref 4–11)

## 2018-08-27 PROCEDURE — 36415 COLL VENOUS BLD VENIPUNCTURE: CPT

## 2018-08-27 PROCEDURE — 82947 ASSAY GLUCOSE BLOOD QUANT: CPT

## 2018-08-27 PROCEDURE — 80053 COMPREHEN METABOLIC PANEL: CPT

## 2018-08-27 PROCEDURE — 85027 COMPLETE CBC AUTOMATED: CPT

## 2018-09-04 DIAGNOSIS — Z94.4 LIVER REPLACED BY TRANSPLANT (H): ICD-10-CM

## 2018-09-04 LAB
ALBUMIN SERPL-MCNC: 3.4 G/DL (ref 3.5–5.7)
ALP SERPL-CCNC: 57 U/L (ref 34–104)
ALT SERPL W P-5'-P-CCNC: 12 U/L (ref 7–52)
ANION GAP SERPL CALCULATED.3IONS-SCNC: 6 MMOL/L (ref 3–14)
AST SERPL W P-5'-P-CCNC: 19 U/L (ref 13–39)
BILIRUB SERPL-MCNC: 0.9 MG/DL (ref 0.3–1)
BUN SERPL-MCNC: 12 MG/DL (ref 7–25)
CALCIUM SERPL-MCNC: 8.6 MG/DL (ref 8.6–10.3)
CHLORIDE SERPL-SCNC: 108 MMOL/L (ref 98–107)
CO2 SERPL-SCNC: 25 MMOL/L (ref 21–31)
CREAT SERPL-MCNC: 1.03 MG/DL (ref 0.7–1.3)
ERYTHROCYTE [DISTWIDTH] IN BLOOD BY AUTOMATED COUNT: 15.4 % (ref 10–15)
GFR SERPL CREATININE-BSD FRML MDRD: 73 ML/MIN/1.7M2
GLUCOSE SERPL-MCNC: 161 MG/DL (ref 70–105)
HCT VFR BLD AUTO: 31.7 % (ref 40–53)
HGB BLD-MCNC: 10.6 G/DL (ref 13.3–17.7)
MCH RBC QN AUTO: 30 PG (ref 26.5–33)
MCHC RBC AUTO-ENTMCNC: 33.4 G/DL (ref 31.5–36.5)
MCV RBC AUTO: 90 FL (ref 78–100)
PLATELET # BLD AUTO: 71 10E9/L (ref 150–450)
POTASSIUM SERPL-SCNC: 3.6 MMOL/L (ref 3.5–5.1)
PROT SERPL-MCNC: 5.8 G/DL (ref 6.4–8.9)
RBC # BLD AUTO: 3.53 10E12/L (ref 4.4–5.9)
SODIUM SERPL-SCNC: 139 MMOL/L (ref 134–144)
WBC # BLD AUTO: 2.8 10E9/L (ref 4–11)

## 2018-09-04 PROCEDURE — 36415 COLL VENOUS BLD VENIPUNCTURE: CPT

## 2018-09-04 PROCEDURE — 85027 COMPLETE CBC AUTOMATED: CPT

## 2018-09-04 PROCEDURE — 80053 COMPREHEN METABOLIC PANEL: CPT

## 2018-09-10 DIAGNOSIS — Z94.4 LIVER REPLACED BY TRANSPLANT (H): ICD-10-CM

## 2018-09-10 LAB
ALBUMIN SERPL-MCNC: 3.5 G/DL (ref 3.5–5.7)
ALP SERPL-CCNC: 66 U/L (ref 34–104)
ALT SERPL W P-5'-P-CCNC: 10 U/L (ref 7–52)
ANION GAP SERPL CALCULATED.3IONS-SCNC: 7 MMOL/L (ref 3–14)
AST SERPL W P-5'-P-CCNC: 19 U/L (ref 13–39)
BILIRUB SERPL-MCNC: 0.8 MG/DL (ref 0.3–1)
BUN SERPL-MCNC: 12 MG/DL (ref 7–25)
CALCIUM SERPL-MCNC: 8.6 MG/DL (ref 8.6–10.3)
CHLORIDE SERPL-SCNC: 110 MMOL/L (ref 98–107)
CO2 SERPL-SCNC: 24 MMOL/L (ref 21–31)
CREAT SERPL-MCNC: 0.98 MG/DL (ref 0.7–1.3)
ERYTHROCYTE [DISTWIDTH] IN BLOOD BY AUTOMATED COUNT: 14.9 % (ref 10–15)
GFR SERPL CREATININE-BSD FRML MDRD: 78 ML/MIN/1.7M2
GLUCOSE SERPL-MCNC: 154 MG/DL (ref 70–105)
HCT VFR BLD AUTO: 33.6 % (ref 40–53)
HGB BLD-MCNC: 11.2 G/DL (ref 13.3–17.7)
MCH RBC QN AUTO: 29.8 PG (ref 26.5–33)
MCHC RBC AUTO-ENTMCNC: 33.3 G/DL (ref 31.5–36.5)
MCV RBC AUTO: 89 FL (ref 78–100)
PLATELET # BLD AUTO: 66 10E9/L (ref 150–450)
POTASSIUM SERPL-SCNC: 3.9 MMOL/L (ref 3.5–5.1)
PROT SERPL-MCNC: 6 G/DL (ref 6.4–8.9)
RBC # BLD AUTO: 3.76 10E12/L (ref 4.4–5.9)
SODIUM SERPL-SCNC: 141 MMOL/L (ref 134–144)
WBC # BLD AUTO: 2.6 10E9/L (ref 4–11)

## 2018-09-10 PROCEDURE — 80053 COMPREHEN METABOLIC PANEL: CPT

## 2018-09-10 PROCEDURE — 85027 COMPLETE CBC AUTOMATED: CPT

## 2018-09-10 PROCEDURE — 36415 COLL VENOUS BLD VENIPUNCTURE: CPT

## 2018-09-17 DIAGNOSIS — Z94.4 LIVER REPLACED BY TRANSPLANT (H): ICD-10-CM

## 2018-09-17 LAB
ALBUMIN SERPL-MCNC: 3.5 G/DL (ref 3.5–5.7)
ALP SERPL-CCNC: 74 U/L (ref 34–104)
ALT SERPL W P-5'-P-CCNC: 9 U/L (ref 7–52)
ANION GAP SERPL CALCULATED.3IONS-SCNC: 8 MMOL/L (ref 3–14)
AST SERPL W P-5'-P-CCNC: 19 U/L (ref 13–39)
BILIRUB SERPL-MCNC: 0.9 MG/DL (ref 0.3–1)
BUN SERPL-MCNC: 13 MG/DL (ref 7–25)
CALCIUM SERPL-MCNC: 8.9 MG/DL (ref 8.6–10.3)
CHLORIDE SERPL-SCNC: 108 MMOL/L (ref 98–107)
CO2 SERPL-SCNC: 24 MMOL/L (ref 21–31)
CREAT SERPL-MCNC: 1.04 MG/DL (ref 0.7–1.3)
ERYTHROCYTE [DISTWIDTH] IN BLOOD BY AUTOMATED COUNT: 14.8 % (ref 10–15)
GFR SERPL CREATININE-BSD FRML MDRD: 73 ML/MIN/1.7M2
GLUCOSE SERPL-MCNC: 112 MG/DL (ref 70–105)
HCT VFR BLD AUTO: 33 % (ref 40–53)
HGB BLD-MCNC: 11.2 G/DL (ref 13.3–17.7)
MCH RBC QN AUTO: 29.9 PG (ref 26.5–33)
MCHC RBC AUTO-ENTMCNC: 33.9 G/DL (ref 31.5–36.5)
MCV RBC AUTO: 88 FL (ref 78–100)
PLATELET # BLD AUTO: 88 10E9/L (ref 150–450)
POTASSIUM SERPL-SCNC: 4.1 MMOL/L (ref 3.5–5.1)
PROT SERPL-MCNC: 6.1 G/DL (ref 6.4–8.9)
RBC # BLD AUTO: 3.75 10E12/L (ref 4.4–5.9)
SODIUM SERPL-SCNC: 140 MMOL/L (ref 134–144)
WBC # BLD AUTO: 2.1 10E9/L (ref 4–11)

## 2018-09-17 PROCEDURE — 36415 COLL VENOUS BLD VENIPUNCTURE: CPT

## 2018-09-17 PROCEDURE — 80053 COMPREHEN METABOLIC PANEL: CPT

## 2018-09-17 PROCEDURE — 85027 COMPLETE CBC AUTOMATED: CPT

## 2018-09-24 DIAGNOSIS — Z94.4 LIVER REPLACED BY TRANSPLANT (H): ICD-10-CM

## 2018-09-24 LAB
ALBUMIN SERPL-MCNC: 3.4 G/DL (ref 3.5–5.7)
ALP SERPL-CCNC: 69 U/L (ref 34–104)
ALT SERPL W P-5'-P-CCNC: 10 U/L (ref 7–52)
ANION GAP SERPL CALCULATED.3IONS-SCNC: 7 MMOL/L (ref 3–14)
AST SERPL W P-5'-P-CCNC: 19 U/L (ref 13–39)
BILIRUB SERPL-MCNC: 0.9 MG/DL (ref 0.3–1)
BUN SERPL-MCNC: 10 MG/DL (ref 7–25)
CALCIUM SERPL-MCNC: 8.9 MG/DL (ref 8.6–10.3)
CHLORIDE SERPL-SCNC: 110 MMOL/L (ref 98–107)
CO2 SERPL-SCNC: 24 MMOL/L (ref 21–31)
CREAT SERPL-MCNC: 0.98 MG/DL (ref 0.7–1.3)
ERYTHROCYTE [DISTWIDTH] IN BLOOD BY AUTOMATED COUNT: 15.3 % (ref 10–15)
GFR SERPL CREATININE-BSD FRML MDRD: 78 ML/MIN/1.7M2
GLUCOSE SERPL-MCNC: 121 MG/DL (ref 70–105)
HCT VFR BLD AUTO: 34.6 % (ref 40–53)
HGB BLD-MCNC: 11.8 G/DL (ref 13.3–17.7)
MCH RBC QN AUTO: 29.9 PG (ref 26.5–33)
MCHC RBC AUTO-ENTMCNC: 34.1 G/DL (ref 31.5–36.5)
MCV RBC AUTO: 88 FL (ref 78–100)
PLATELET # BLD AUTO: 75 10E9/L (ref 150–450)
POTASSIUM SERPL-SCNC: 3.9 MMOL/L (ref 3.5–5.1)
PROT SERPL-MCNC: 5.8 G/DL (ref 6.4–8.9)
RBC # BLD AUTO: 3.95 10E12/L (ref 4.4–5.9)
SODIUM SERPL-SCNC: 141 MMOL/L (ref 134–144)
WBC # BLD AUTO: 1.6 10E9/L (ref 4–11)

## 2018-09-24 PROCEDURE — 36415 COLL VENOUS BLD VENIPUNCTURE: CPT

## 2018-09-24 PROCEDURE — 80053 COMPREHEN METABOLIC PANEL: CPT

## 2018-09-24 PROCEDURE — 85027 COMPLETE CBC AUTOMATED: CPT

## 2018-10-02 DIAGNOSIS — Z94.4 LIVER REPLACED BY TRANSPLANT (H): ICD-10-CM

## 2018-10-02 LAB
ALBUMIN SERPL-MCNC: 3.3 G/DL (ref 3.5–5.7)
ALP SERPL-CCNC: 71 U/L (ref 34–104)
ALT SERPL W P-5'-P-CCNC: 10 U/L (ref 7–52)
ANION GAP SERPL CALCULATED.3IONS-SCNC: 9 MMOL/L (ref 3–14)
AST SERPL W P-5'-P-CCNC: 22 U/L (ref 13–39)
BILIRUB SERPL-MCNC: 1.1 MG/DL (ref 0.3–1)
BUN SERPL-MCNC: 12 MG/DL (ref 7–25)
CALCIUM SERPL-MCNC: 8.6 MG/DL (ref 8.6–10.3)
CHLORIDE SERPL-SCNC: 108 MMOL/L (ref 98–107)
CO2 SERPL-SCNC: 24 MMOL/L (ref 21–31)
CREAT SERPL-MCNC: 0.92 MG/DL (ref 0.7–1.3)
ERYTHROCYTE [DISTWIDTH] IN BLOOD BY AUTOMATED COUNT: 16 % (ref 10–15)
GFR SERPL CREATININE-BSD FRML MDRD: 84 ML/MIN/1.7M2
GLUCOSE SERPL-MCNC: 102 MG/DL (ref 70–105)
HCT VFR BLD AUTO: 34.5 % (ref 40–53)
HGB BLD-MCNC: 11.5 G/DL (ref 13.3–17.7)
MCH RBC QN AUTO: 29.9 PG (ref 26.5–33)
MCHC RBC AUTO-ENTMCNC: 33.3 G/DL (ref 31.5–36.5)
MCV RBC AUTO: 90 FL (ref 78–100)
PLATELET # BLD AUTO: 69 10E9/L (ref 150–450)
POTASSIUM SERPL-SCNC: 4 MMOL/L (ref 3.5–5.1)
PROT SERPL-MCNC: 5.8 G/DL (ref 6.4–8.9)
RBC # BLD AUTO: 3.85 10E12/L (ref 4.4–5.9)
SODIUM SERPL-SCNC: 141 MMOL/L (ref 134–144)
WBC # BLD AUTO: 1.8 10E9/L (ref 4–11)

## 2018-10-02 PROCEDURE — 80053 COMPREHEN METABOLIC PANEL: CPT

## 2018-10-02 PROCEDURE — 85027 COMPLETE CBC AUTOMATED: CPT

## 2018-10-02 PROCEDURE — 36415 COLL VENOUS BLD VENIPUNCTURE: CPT

## 2018-10-08 DIAGNOSIS — Z94.4 LIVER REPLACED BY TRANSPLANT (H): ICD-10-CM

## 2018-10-08 LAB
ALBUMIN SERPL-MCNC: 3.2 G/DL (ref 3.5–5.7)
ALP SERPL-CCNC: 65 U/L (ref 34–104)
ALT SERPL W P-5'-P-CCNC: 9 U/L (ref 7–52)
ANION GAP SERPL CALCULATED.3IONS-SCNC: 7 MMOL/L (ref 3–14)
AST SERPL W P-5'-P-CCNC: 20 U/L (ref 13–39)
BILIRUB SERPL-MCNC: 0.7 MG/DL (ref 0.3–1)
BUN SERPL-MCNC: 14 MG/DL (ref 7–25)
CALCIUM SERPL-MCNC: 8.9 MG/DL (ref 8.6–10.3)
CHLORIDE SERPL-SCNC: 108 MMOL/L (ref 98–107)
CO2 SERPL-SCNC: 25 MMOL/L (ref 21–31)
CREAT SERPL-MCNC: 0.93 MG/DL (ref 0.7–1.3)
ERYTHROCYTE [DISTWIDTH] IN BLOOD BY AUTOMATED COUNT: 16.1 % (ref 10–15)
GFR SERPL CREATININE-BSD FRML MDRD: 83 ML/MIN/1.7M2
GLUCOSE SERPL-MCNC: 143 MG/DL (ref 70–105)
HCT VFR BLD AUTO: 32.9 % (ref 40–53)
HGB BLD-MCNC: 11.3 G/DL (ref 13.3–17.7)
MCH RBC QN AUTO: 30.1 PG (ref 26.5–33)
MCHC RBC AUTO-ENTMCNC: 34.3 G/DL (ref 31.5–36.5)
MCV RBC AUTO: 88 FL (ref 78–100)
PLATELET # BLD AUTO: 74 10E9/L (ref 150–450)
POTASSIUM SERPL-SCNC: 4.2 MMOL/L (ref 3.5–5.1)
PROT SERPL-MCNC: 5.8 G/DL (ref 6.4–8.9)
RBC # BLD AUTO: 3.76 10E12/L (ref 4.4–5.9)
SODIUM SERPL-SCNC: 140 MMOL/L (ref 134–144)
WBC # BLD AUTO: 1.5 10E9/L (ref 4–11)

## 2018-10-08 PROCEDURE — 36415 COLL VENOUS BLD VENIPUNCTURE: CPT

## 2018-10-08 PROCEDURE — 85027 COMPLETE CBC AUTOMATED: CPT

## 2018-10-08 PROCEDURE — 80053 COMPREHEN METABOLIC PANEL: CPT

## 2018-10-15 DIAGNOSIS — Z94.4 LIVER REPLACED BY TRANSPLANT (H): ICD-10-CM

## 2018-10-15 LAB
ALBUMIN SERPL-MCNC: 3.2 G/DL (ref 3.5–5.7)
ALP SERPL-CCNC: 72 U/L (ref 34–104)
ALT SERPL W P-5'-P-CCNC: 12 U/L (ref 7–52)
ANION GAP SERPL CALCULATED.3IONS-SCNC: 4 MMOL/L (ref 3–14)
AST SERPL W P-5'-P-CCNC: 24 U/L (ref 13–39)
BILIRUB SERPL-MCNC: 0.9 MG/DL (ref 0.3–1)
BUN SERPL-MCNC: 12 MG/DL (ref 7–25)
CALCIUM SERPL-MCNC: 8.5 MG/DL (ref 8.6–10.3)
CHLORIDE SERPL-SCNC: 109 MMOL/L (ref 98–107)
CO2 SERPL-SCNC: 30 MMOL/L (ref 21–31)
CREAT SERPL-MCNC: 0.99 MG/DL (ref 0.7–1.3)
ERYTHROCYTE [DISTWIDTH] IN BLOOD BY AUTOMATED COUNT: 16.1 % (ref 10–15)
GFR SERPL CREATININE-BSD FRML MDRD: 77 ML/MIN/1.7M2
GLUCOSE SERPL-MCNC: 95 MG/DL (ref 70–105)
HCT VFR BLD AUTO: 34.6 % (ref 40–53)
HGB BLD-MCNC: 11.5 G/DL (ref 13.3–17.7)
MCH RBC QN AUTO: 29.9 PG (ref 26.5–33)
MCHC RBC AUTO-ENTMCNC: 33.2 G/DL (ref 31.5–36.5)
MCV RBC AUTO: 90 FL (ref 78–100)
PLATELET # BLD AUTO: 75 10E9/L (ref 150–450)
POTASSIUM SERPL-SCNC: 4.5 MMOL/L (ref 3.5–5.1)
PROT SERPL-MCNC: 5.5 G/DL (ref 6.4–8.9)
RBC # BLD AUTO: 3.85 10E12/L (ref 4.4–5.9)
SODIUM SERPL-SCNC: 143 MMOL/L (ref 134–144)
WBC # BLD AUTO: 1.6 10E9/L (ref 4–11)

## 2018-10-15 PROCEDURE — 80053 COMPREHEN METABOLIC PANEL: CPT

## 2018-10-15 PROCEDURE — 85027 COMPLETE CBC AUTOMATED: CPT

## 2018-10-15 PROCEDURE — 36415 COLL VENOUS BLD VENIPUNCTURE: CPT

## 2018-10-22 DIAGNOSIS — Z94.4 LIVER REPLACED BY TRANSPLANT (H): ICD-10-CM

## 2018-10-22 LAB
ALBUMIN SERPL-MCNC: 3.4 G/DL (ref 3.5–5.7)
ALP SERPL-CCNC: 64 U/L (ref 34–104)
ALT SERPL W P-5'-P-CCNC: 11 U/L (ref 7–52)
ANION GAP SERPL CALCULATED.3IONS-SCNC: 7 MMOL/L (ref 3–14)
AST SERPL W P-5'-P-CCNC: 20 U/L (ref 13–39)
BILIRUB SERPL-MCNC: 0.7 MG/DL (ref 0.3–1)
BUN SERPL-MCNC: 10 MG/DL (ref 7–25)
CALCIUM SERPL-MCNC: 9 MG/DL (ref 8.6–10.3)
CHLORIDE SERPL-SCNC: 106 MMOL/L (ref 98–107)
CO2 SERPL-SCNC: 27 MMOL/L (ref 21–31)
CREAT SERPL-MCNC: 0.97 MG/DL (ref 0.7–1.3)
ERYTHROCYTE [DISTWIDTH] IN BLOOD BY AUTOMATED COUNT: 15.3 % (ref 10–15)
GFR SERPL CREATININE-BSD FRML MDRD: 79 ML/MIN/1.7M2
GLUCOSE SERPL-MCNC: 138 MG/DL (ref 70–105)
HCT VFR BLD AUTO: 36.8 % (ref 40–53)
HGB BLD-MCNC: 12.2 G/DL (ref 13.3–17.7)
MCH RBC QN AUTO: 29.3 PG (ref 26.5–33)
MCHC RBC AUTO-ENTMCNC: 33.2 G/DL (ref 31.5–36.5)
MCV RBC AUTO: 88 FL (ref 78–100)
PLATELET # BLD AUTO: 83 10E9/L (ref 150–450)
POTASSIUM SERPL-SCNC: 3.6 MMOL/L (ref 3.5–5.1)
PROT SERPL-MCNC: 6 G/DL (ref 6.4–8.9)
RBC # BLD AUTO: 4.17 10E12/L (ref 4.4–5.9)
SODIUM SERPL-SCNC: 140 MMOL/L (ref 134–144)
WBC # BLD AUTO: 2.4 10E9/L (ref 4–11)

## 2018-10-22 PROCEDURE — 85027 COMPLETE CBC AUTOMATED: CPT

## 2018-10-22 PROCEDURE — 80053 COMPREHEN METABOLIC PANEL: CPT

## 2018-10-22 PROCEDURE — 36415 COLL VENOUS BLD VENIPUNCTURE: CPT

## 2018-10-29 DIAGNOSIS — Z94.4 LIVER REPLACED BY TRANSPLANT (H): ICD-10-CM

## 2018-10-29 LAB
ALBUMIN SERPL-MCNC: 3.5 G/DL (ref 3.5–5.7)
ALP SERPL-CCNC: 66 U/L (ref 34–104)
ALT SERPL W P-5'-P-CCNC: 14 U/L (ref 7–52)
ANION GAP SERPL CALCULATED.3IONS-SCNC: 8 MMOL/L (ref 3–14)
AST SERPL W P-5'-P-CCNC: 25 U/L (ref 13–39)
BILIRUB SERPL-MCNC: 0.7 MG/DL (ref 0.3–1)
BUN SERPL-MCNC: 14 MG/DL (ref 7–25)
CALCIUM SERPL-MCNC: 8.7 MG/DL (ref 8.6–10.3)
CHLORIDE SERPL-SCNC: 110 MMOL/L (ref 98–107)
CO2 SERPL-SCNC: 25 MMOL/L (ref 21–31)
CREAT SERPL-MCNC: 0.98 MG/DL (ref 0.7–1.3)
ERYTHROCYTE [DISTWIDTH] IN BLOOD BY AUTOMATED COUNT: 14.8 % (ref 10–15)
GFR SERPL CREATININE-BSD FRML MDRD: 78 ML/MIN/1.7M2
GLUCOSE SERPL-MCNC: 131 MG/DL (ref 70–105)
HCT VFR BLD AUTO: 37.1 % (ref 40–53)
HGB BLD-MCNC: 12.3 G/DL (ref 13.3–17.7)
MCH RBC QN AUTO: 29.4 PG (ref 26.5–33)
MCHC RBC AUTO-ENTMCNC: 33.2 G/DL (ref 31.5–36.5)
MCV RBC AUTO: 89 FL (ref 78–100)
PLATELET # BLD AUTO: 99 10E9/L (ref 150–450)
POTASSIUM SERPL-SCNC: 3.7 MMOL/L (ref 3.5–5.1)
PROT SERPL-MCNC: 5.9 G/DL (ref 6.4–8.9)
RBC # BLD AUTO: 4.19 10E12/L (ref 4.4–5.9)
SODIUM SERPL-SCNC: 143 MMOL/L (ref 134–144)
WBC # BLD AUTO: 3.5 10E9/L (ref 4–11)

## 2018-10-29 PROCEDURE — 36415 COLL VENOUS BLD VENIPUNCTURE: CPT

## 2018-10-29 PROCEDURE — 80053 COMPREHEN METABOLIC PANEL: CPT

## 2018-10-29 PROCEDURE — 85027 COMPLETE CBC AUTOMATED: CPT

## 2018-11-05 DIAGNOSIS — Z94.4 LIVER REPLACED BY TRANSPLANT (H): ICD-10-CM

## 2018-11-05 LAB
ALBUMIN SERPL-MCNC: 3.4 G/DL (ref 3.5–5.7)
ALP SERPL-CCNC: 64 U/L (ref 34–104)
ALT SERPL W P-5'-P-CCNC: 14 U/L (ref 7–52)
ANION GAP SERPL CALCULATED.3IONS-SCNC: 9 MMOL/L (ref 3–14)
AST SERPL W P-5'-P-CCNC: 24 U/L (ref 13–39)
BILIRUB SERPL-MCNC: 0.8 MG/DL (ref 0.3–1)
BUN SERPL-MCNC: 15 MG/DL (ref 7–25)
CALCIUM SERPL-MCNC: 8.9 MG/DL (ref 8.6–10.3)
CHLORIDE SERPL-SCNC: 107 MMOL/L (ref 98–107)
CO2 SERPL-SCNC: 25 MMOL/L (ref 21–31)
CREAT SERPL-MCNC: 0.99 MG/DL (ref 0.7–1.3)
ERYTHROCYTE [DISTWIDTH] IN BLOOD BY AUTOMATED COUNT: 14.9 % (ref 10–15)
GFR SERPL CREATININE-BSD FRML MDRD: 77 ML/MIN/1.7M2
GLUCOSE SERPL-MCNC: 189 MG/DL (ref 70–105)
HCT VFR BLD AUTO: 37.1 % (ref 40–53)
HGB BLD-MCNC: 12.5 G/DL (ref 13.3–17.7)
MCH RBC QN AUTO: 29.6 PG (ref 26.5–33)
MCHC RBC AUTO-ENTMCNC: 33.7 G/DL (ref 31.5–36.5)
MCV RBC AUTO: 88 FL (ref 78–100)
PLATELET # BLD AUTO: 88 10E9/L (ref 150–450)
POTASSIUM SERPL-SCNC: 3.7 MMOL/L (ref 3.5–5.1)
PROT SERPL-MCNC: 6.1 G/DL (ref 6.4–8.9)
RBC # BLD AUTO: 4.23 10E12/L (ref 4.4–5.9)
SODIUM SERPL-SCNC: 141 MMOL/L (ref 134–144)
WBC # BLD AUTO: 3.8 10E9/L (ref 4–11)

## 2018-11-05 PROCEDURE — 80053 COMPREHEN METABOLIC PANEL: CPT

## 2018-11-05 PROCEDURE — 85027 COMPLETE CBC AUTOMATED: CPT

## 2018-11-05 PROCEDURE — 36415 COLL VENOUS BLD VENIPUNCTURE: CPT

## 2018-11-12 DIAGNOSIS — Z94.4 LIVER REPLACED BY TRANSPLANT (H): ICD-10-CM

## 2018-11-12 LAB
ALBUMIN SERPL-MCNC: 3.7 G/DL (ref 3.5–5.7)
ALP SERPL-CCNC: 69 U/L (ref 34–104)
ALT SERPL W P-5'-P-CCNC: 12 U/L (ref 7–52)
ANION GAP SERPL CALCULATED.3IONS-SCNC: 8 MMOL/L (ref 3–14)
AST SERPL W P-5'-P-CCNC: 23 U/L (ref 13–39)
BILIRUB SERPL-MCNC: 1 MG/DL (ref 0.3–1)
BUN SERPL-MCNC: 15 MG/DL (ref 7–25)
CALCIUM SERPL-MCNC: 9 MG/DL (ref 8.6–10.3)
CHLORIDE SERPL-SCNC: 107 MMOL/L (ref 98–107)
CO2 SERPL-SCNC: 28 MMOL/L (ref 21–31)
CREAT SERPL-MCNC: 1.08 MG/DL (ref 0.7–1.3)
ERYTHROCYTE [DISTWIDTH] IN BLOOD BY AUTOMATED COUNT: 15.4 % (ref 10–15)
GFR SERPL CREATININE-BSD FRML MDRD: 70 ML/MIN/1.7M2
GLUCOSE SERPL-MCNC: 108 MG/DL (ref 70–105)
HCT VFR BLD AUTO: 38.5 % (ref 40–53)
HGB BLD-MCNC: 13 G/DL (ref 13.3–17.7)
MCH RBC QN AUTO: 30 PG (ref 26.5–33)
MCHC RBC AUTO-ENTMCNC: 33.8 G/DL (ref 31.5–36.5)
MCV RBC AUTO: 89 FL (ref 78–100)
PLATELET # BLD AUTO: 73 10E9/L (ref 150–450)
POTASSIUM SERPL-SCNC: 4 MMOL/L (ref 3.5–5.1)
PROT SERPL-MCNC: 6.1 G/DL (ref 6.4–8.9)
RBC # BLD AUTO: 4.33 10E12/L (ref 4.4–5.9)
SODIUM SERPL-SCNC: 143 MMOL/L (ref 134–144)
WBC # BLD AUTO: 4.3 10E9/L (ref 4–11)

## 2018-11-12 PROCEDURE — 80053 COMPREHEN METABOLIC PANEL: CPT

## 2018-11-12 PROCEDURE — 85027 COMPLETE CBC AUTOMATED: CPT

## 2018-11-12 PROCEDURE — 36415 COLL VENOUS BLD VENIPUNCTURE: CPT

## 2018-11-19 DIAGNOSIS — Z94.4 LIVER REPLACED BY TRANSPLANT (H): ICD-10-CM

## 2018-11-19 LAB
ALBUMIN SERPL-MCNC: 3.7 G/DL (ref 3.5–5.7)
ALP SERPL-CCNC: 71 U/L (ref 34–104)
ALT SERPL W P-5'-P-CCNC: 11 U/L (ref 7–52)
ANION GAP SERPL CALCULATED.3IONS-SCNC: 7 MMOL/L (ref 3–14)
AST SERPL W P-5'-P-CCNC: 21 U/L (ref 13–39)
BILIRUB SERPL-MCNC: 0.9 MG/DL (ref 0.3–1)
BUN SERPL-MCNC: 13 MG/DL (ref 7–25)
CALCIUM SERPL-MCNC: 9.1 MG/DL (ref 8.6–10.3)
CHLORIDE SERPL-SCNC: 106 MMOL/L (ref 98–107)
CO2 SERPL-SCNC: 28 MMOL/L (ref 21–31)
CREAT SERPL-MCNC: 1.04 MG/DL (ref 0.7–1.3)
ERYTHROCYTE [DISTWIDTH] IN BLOOD BY AUTOMATED COUNT: 15.9 % (ref 10–15)
GFR SERPL CREATININE-BSD FRML MDRD: 73 ML/MIN/1.7M2
GLUCOSE SERPL-MCNC: 121 MG/DL (ref 70–105)
HCT VFR BLD AUTO: 36.9 % (ref 40–53)
HGB BLD-MCNC: 12.5 G/DL (ref 13.3–17.7)
MCH RBC QN AUTO: 30.3 PG (ref 26.5–33)
MCHC RBC AUTO-ENTMCNC: 33.9 G/DL (ref 31.5–36.5)
MCV RBC AUTO: 89 FL (ref 78–100)
PLATELET # BLD AUTO: 86 10E9/L (ref 150–450)
POTASSIUM SERPL-SCNC: 3.6 MMOL/L (ref 3.5–5.1)
PROT SERPL-MCNC: 6.2 G/DL (ref 6.4–8.9)
RBC # BLD AUTO: 4.13 10E12/L (ref 4.4–5.9)
SODIUM SERPL-SCNC: 141 MMOL/L (ref 134–144)
WBC # BLD AUTO: 3.8 10E9/L (ref 4–11)

## 2018-11-19 PROCEDURE — 85027 COMPLETE CBC AUTOMATED: CPT

## 2018-11-19 PROCEDURE — 36415 COLL VENOUS BLD VENIPUNCTURE: CPT

## 2018-11-19 PROCEDURE — 80053 COMPREHEN METABOLIC PANEL: CPT

## 2018-11-26 DIAGNOSIS — Z94.4 LIVER REPLACED BY TRANSPLANT (H): ICD-10-CM

## 2018-11-26 LAB
ALBUMIN SERPL-MCNC: 3.7 G/DL (ref 3.5–5.7)
ALP SERPL-CCNC: 61 U/L (ref 34–104)
ALT SERPL W P-5'-P-CCNC: 11 U/L (ref 7–52)
ANION GAP SERPL CALCULATED.3IONS-SCNC: 6 MMOL/L (ref 3–14)
AST SERPL W P-5'-P-CCNC: 18 U/L (ref 13–39)
BILIRUB SERPL-MCNC: 1.2 MG/DL (ref 0.3–1)
BUN SERPL-MCNC: 15 MG/DL (ref 7–25)
CALCIUM SERPL-MCNC: 9 MG/DL (ref 8.6–10.3)
CHLORIDE SERPL-SCNC: 106 MMOL/L (ref 98–107)
CO2 SERPL-SCNC: 27 MMOL/L (ref 21–31)
CREAT SERPL-MCNC: 0.97 MG/DL (ref 0.7–1.3)
ERYTHROCYTE [DISTWIDTH] IN BLOOD BY AUTOMATED COUNT: 15.5 % (ref 10–15)
GFR SERPL CREATININE-BSD FRML MDRD: 79 ML/MIN/1.7M2
GLUCOSE SERPL-MCNC: 95 MG/DL (ref 70–105)
HCT VFR BLD AUTO: 38 % (ref 40–53)
HGB BLD-MCNC: 12.9 G/DL (ref 13.3–17.7)
MCH RBC QN AUTO: 30.2 PG (ref 26.5–33)
MCHC RBC AUTO-ENTMCNC: 33.9 G/DL (ref 31.5–36.5)
MCV RBC AUTO: 89 FL (ref 78–100)
PLATELET # BLD AUTO: 78 10E9/L (ref 150–450)
POTASSIUM SERPL-SCNC: 4 MMOL/L (ref 3.5–5.1)
PROT SERPL-MCNC: 6.5 G/DL (ref 6.4–8.9)
RBC # BLD AUTO: 4.27 10E12/L (ref 4.4–5.9)
SODIUM SERPL-SCNC: 139 MMOL/L (ref 134–144)
WBC # BLD AUTO: 3.8 10E9/L (ref 4–11)

## 2018-11-26 PROCEDURE — 80053 COMPREHEN METABOLIC PANEL: CPT

## 2018-11-26 PROCEDURE — 85027 COMPLETE CBC AUTOMATED: CPT

## 2018-11-26 PROCEDURE — 36415 COLL VENOUS BLD VENIPUNCTURE: CPT

## 2018-12-03 DIAGNOSIS — Z94.4 LIVER REPLACED BY TRANSPLANT (H): ICD-10-CM

## 2018-12-03 LAB
ALBUMIN SERPL-MCNC: 3.8 G/DL (ref 3.5–5.7)
ALP SERPL-CCNC: 68 U/L (ref 34–104)
ALT SERPL W P-5'-P-CCNC: 11 U/L (ref 7–52)
ANION GAP SERPL CALCULATED.3IONS-SCNC: 6 MMOL/L (ref 3–14)
AST SERPL W P-5'-P-CCNC: 17 U/L (ref 13–39)
BILIRUB SERPL-MCNC: 1.5 MG/DL (ref 0.3–1)
BUN SERPL-MCNC: 16 MG/DL (ref 7–25)
CALCIUM SERPL-MCNC: 8.8 MG/DL (ref 8.6–10.3)
CHLORIDE SERPL-SCNC: 104 MMOL/L (ref 98–107)
CO2 SERPL-SCNC: 28 MMOL/L (ref 21–31)
CREAT SERPL-MCNC: 1.07 MG/DL (ref 0.7–1.3)
ERYTHROCYTE [DISTWIDTH] IN BLOOD BY AUTOMATED COUNT: 15.1 % (ref 10–15)
GFR SERPL CREATININE-BSD FRML MDRD: 70 ML/MIN/1.7M2
GLUCOSE SERPL-MCNC: 118 MG/DL (ref 70–105)
HCT VFR BLD AUTO: 39.8 % (ref 40–53)
HGB BLD-MCNC: 13.5 G/DL (ref 13.3–17.7)
MCH RBC QN AUTO: 30.1 PG (ref 26.5–33)
MCHC RBC AUTO-ENTMCNC: 33.9 G/DL (ref 31.5–36.5)
MCV RBC AUTO: 89 FL (ref 78–100)
PLATELET # BLD AUTO: 79 10E9/L (ref 150–450)
POTASSIUM SERPL-SCNC: 4.1 MMOL/L (ref 3.5–5.1)
PROT SERPL-MCNC: 6.5 G/DL (ref 6.4–8.9)
RBC # BLD AUTO: 4.49 10E12/L (ref 4.4–5.9)
SODIUM SERPL-SCNC: 138 MMOL/L (ref 134–144)
WBC # BLD AUTO: 3.4 10E9/L (ref 4–11)

## 2018-12-03 PROCEDURE — 80053 COMPREHEN METABOLIC PANEL: CPT

## 2018-12-03 PROCEDURE — 85027 COMPLETE CBC AUTOMATED: CPT

## 2018-12-03 PROCEDURE — 36415 COLL VENOUS BLD VENIPUNCTURE: CPT

## 2018-12-10 ENCOUNTER — ALLIED HEALTH/NURSE VISIT (OUTPATIENT)
Dept: FAMILY MEDICINE | Facility: OTHER | Age: 63
End: 2018-12-10
Attending: INTERNAL MEDICINE
Payer: COMMERCIAL

## 2018-12-10 ENCOUNTER — MEDICAL CORRESPONDENCE (OUTPATIENT)
Dept: HEALTH INFORMATION MANAGEMENT | Facility: OTHER | Age: 63
End: 2018-12-10

## 2018-12-10 DIAGNOSIS — Z94.4 LIVER REPLACED BY TRANSPLANT (H): ICD-10-CM

## 2018-12-10 DIAGNOSIS — Z23 NEED FOR PROPHYLACTIC VACCINATION AND INOCULATION AGAINST INFLUENZA: Primary | ICD-10-CM

## 2018-12-10 LAB
ALBUMIN SERPL-MCNC: 3.7 G/DL (ref 3.5–5.7)
ALP SERPL-CCNC: 74 U/L (ref 34–104)
ALT SERPL W P-5'-P-CCNC: 10 U/L (ref 7–52)
ANION GAP SERPL CALCULATED.3IONS-SCNC: 6 MMOL/L (ref 3–14)
AST SERPL W P-5'-P-CCNC: 18 U/L (ref 13–39)
BILIRUB SERPL-MCNC: 1.1 MG/DL (ref 0.3–1)
BUN SERPL-MCNC: 18 MG/DL (ref 7–25)
CALCIUM SERPL-MCNC: 9.2 MG/DL (ref 8.6–10.3)
CHLORIDE SERPL-SCNC: 104 MMOL/L (ref 98–107)
CO2 SERPL-SCNC: 27 MMOL/L (ref 21–31)
CREAT SERPL-MCNC: 1 MG/DL (ref 0.7–1.3)
ERYTHROCYTE [DISTWIDTH] IN BLOOD BY AUTOMATED COUNT: 14.6 % (ref 10–15)
GFR SERPL CREATININE-BSD FRML MDRD: 76 ML/MIN/1.7M2
GLUCOSE SERPL-MCNC: 106 MG/DL (ref 70–105)
HCT VFR BLD AUTO: 36.1 % (ref 40–53)
HGB BLD-MCNC: 12.6 G/DL (ref 13.3–17.7)
MCH RBC QN AUTO: 30 PG (ref 26.5–33)
MCHC RBC AUTO-ENTMCNC: 34.9 G/DL (ref 31.5–36.5)
MCV RBC AUTO: 86 FL (ref 78–100)
PLATELET # BLD AUTO: 94 10E9/L (ref 150–450)
POTASSIUM SERPL-SCNC: 4.2 MMOL/L (ref 3.5–5.1)
PROT SERPL-MCNC: 6.7 G/DL (ref 6.4–8.9)
RBC # BLD AUTO: 4.2 10E12/L (ref 4.4–5.9)
SODIUM SERPL-SCNC: 137 MMOL/L (ref 134–144)
WBC # BLD AUTO: 3.6 10E9/L (ref 4–11)

## 2018-12-10 PROCEDURE — 80053 COMPREHEN METABOLIC PANEL: CPT

## 2018-12-10 PROCEDURE — 36415 COLL VENOUS BLD VENIPUNCTURE: CPT

## 2018-12-10 PROCEDURE — 90471 IMMUNIZATION ADMIN: CPT

## 2018-12-10 PROCEDURE — 85027 COMPLETE CBC AUTOMATED: CPT

## 2018-12-10 PROCEDURE — 90686 IIV4 VACC NO PRSV 0.5 ML IM: CPT

## 2018-12-10 NOTE — PROGRESS NOTES
Injectable Influenza Immunization Documentation    1.  Is the person to be vaccinated sick today?   No    2. Does the person to be vaccinated have an allergy to a component   of the vaccine?   No  Egg Allergy Algorithm Link    3. Has the person to be vaccinated ever had a serious reaction   to influenza vaccine in the past?   No    4. Has the person to be vaccinated ever had Guillain-Barré syndrome?   No  Pt denies allergies to yeast gelatin neosporin eggs thimerasol or latex or past reactions to vaccinations.   received order from [facility] for pt to receive this injection today. Order to [facility] to be scanned to EHR  Form completed by Bree Jay RN on 12/10/2018 at 7:55 AM

## 2018-12-17 DIAGNOSIS — Z94.4 LIVER REPLACED BY TRANSPLANT (H): ICD-10-CM

## 2018-12-17 LAB
ALBUMIN SERPL-MCNC: 3.6 G/DL (ref 3.5–5.7)
ALP SERPL-CCNC: 90 U/L (ref 34–104)
ALT SERPL W P-5'-P-CCNC: 9 U/L (ref 7–52)
ANION GAP SERPL CALCULATED.3IONS-SCNC: 7 MMOL/L (ref 3–14)
AST SERPL W P-5'-P-CCNC: 17 U/L (ref 13–39)
BILIRUB SERPL-MCNC: 0.9 MG/DL (ref 0.3–1)
BUN SERPL-MCNC: 12 MG/DL (ref 7–25)
CALCIUM SERPL-MCNC: 8.8 MG/DL (ref 8.6–10.3)
CHLORIDE SERPL-SCNC: 106 MMOL/L (ref 98–107)
CO2 SERPL-SCNC: 27 MMOL/L (ref 21–31)
CREAT SERPL-MCNC: 1.05 MG/DL (ref 0.7–1.3)
ERYTHROCYTE [DISTWIDTH] IN BLOOD BY AUTOMATED COUNT: 15.1 % (ref 10–15)
GFR SERPL CREATININE-BSD FRML MDRD: 72 ML/MIN/1.7M2
GLUCOSE SERPL-MCNC: 174 MG/DL (ref 70–105)
HCT VFR BLD AUTO: 35.9 % (ref 40–53)
HGB BLD-MCNC: 12.3 G/DL (ref 13.3–17.7)
MCH RBC QN AUTO: 30.1 PG (ref 26.5–33)
MCHC RBC AUTO-ENTMCNC: 34.3 G/DL (ref 31.5–36.5)
MCV RBC AUTO: 88 FL (ref 78–100)
PLATELET # BLD AUTO: 110 10E9/L (ref 150–450)
POTASSIUM SERPL-SCNC: 3.9 MMOL/L (ref 3.5–5.1)
PROT SERPL-MCNC: 6 G/DL (ref 6.4–8.9)
RBC # BLD AUTO: 4.09 10E12/L (ref 4.4–5.9)
SODIUM SERPL-SCNC: 140 MMOL/L (ref 134–144)
WBC # BLD AUTO: 3.3 10E9/L (ref 4–11)

## 2018-12-17 PROCEDURE — 80053 COMPREHEN METABOLIC PANEL: CPT

## 2018-12-17 PROCEDURE — 36415 COLL VENOUS BLD VENIPUNCTURE: CPT

## 2018-12-17 PROCEDURE — 85027 COMPLETE CBC AUTOMATED: CPT

## 2018-12-24 DIAGNOSIS — Z94.4 LIVER REPLACED BY TRANSPLANT (H): ICD-10-CM

## 2018-12-24 LAB
ALBUMIN SERPL-MCNC: 3.8 G/DL (ref 3.5–5.7)
ALP SERPL-CCNC: 75 U/L (ref 34–104)
ALT SERPL W P-5'-P-CCNC: 10 U/L (ref 7–52)
ANION GAP SERPL CALCULATED.3IONS-SCNC: 5 MMOL/L (ref 3–14)
AST SERPL W P-5'-P-CCNC: 17 U/L (ref 13–39)
BILIRUB SERPL-MCNC: 1.2 MG/DL (ref 0.3–1)
BUN SERPL-MCNC: 15 MG/DL (ref 7–25)
CALCIUM SERPL-MCNC: 8.9 MG/DL (ref 8.6–10.3)
CHLORIDE SERPL-SCNC: 106 MMOL/L (ref 98–107)
CO2 SERPL-SCNC: 28 MMOL/L (ref 21–31)
CREAT SERPL-MCNC: 0.99 MG/DL (ref 0.7–1.3)
ERYTHROCYTE [DISTWIDTH] IN BLOOD BY AUTOMATED COUNT: 15.3 % (ref 10–15)
GFR SERPL CREATININE-BSD FRML MDRD: 77 ML/MIN/{1.73_M2}
GLUCOSE SERPL-MCNC: 91 MG/DL (ref 70–105)
HCT VFR BLD AUTO: 38.2 % (ref 40–53)
HGB BLD-MCNC: 12.9 G/DL (ref 13.3–17.7)
MCH RBC QN AUTO: 29.8 PG (ref 26.5–33)
MCHC RBC AUTO-ENTMCNC: 33.8 G/DL (ref 31.5–36.5)
MCV RBC AUTO: 88 FL (ref 78–100)
PLATELET # BLD AUTO: 95 10E9/L (ref 150–450)
POTASSIUM SERPL-SCNC: 4.1 MMOL/L (ref 3.5–5.1)
PROT SERPL-MCNC: 7 G/DL (ref 6.4–8.9)
RBC # BLD AUTO: 4.33 10E12/L (ref 4.4–5.9)
SODIUM SERPL-SCNC: 139 MMOL/L (ref 134–144)
WBC # BLD AUTO: 3.5 10E9/L (ref 4–11)

## 2018-12-24 PROCEDURE — 36415 COLL VENOUS BLD VENIPUNCTURE: CPT

## 2018-12-24 PROCEDURE — 80053 COMPREHEN METABOLIC PANEL: CPT

## 2018-12-24 PROCEDURE — 85027 COMPLETE CBC AUTOMATED: CPT

## 2019-01-01 ENCOUNTER — TRANSFERRED RECORDS (OUTPATIENT)
Dept: MULTI SPECIALTY CLINIC | Facility: CLINIC | Age: 64
End: 2019-01-01

## 2019-01-08 DIAGNOSIS — Z94.4 LIVER REPLACED BY TRANSPLANT (H): ICD-10-CM

## 2019-01-08 LAB
ALBUMIN SERPL-MCNC: 4 G/DL (ref 3.5–5.7)
ALP SERPL-CCNC: 73 U/L (ref 34–104)
ALT SERPL W P-5'-P-CCNC: 12 U/L (ref 7–52)
ANION GAP SERPL CALCULATED.3IONS-SCNC: 9 MMOL/L (ref 3–14)
AST SERPL W P-5'-P-CCNC: 16 U/L (ref 13–39)
BILIRUB SERPL-MCNC: 1.1 MG/DL (ref 0.3–1)
BUN SERPL-MCNC: 17 MG/DL (ref 7–25)
CALCIUM SERPL-MCNC: 8.9 MG/DL (ref 8.6–10.3)
CHLORIDE SERPL-SCNC: 106 MMOL/L (ref 98–107)
CO2 SERPL-SCNC: 24 MMOL/L (ref 21–31)
CREAT SERPL-MCNC: 0.99 MG/DL (ref 0.7–1.3)
ERYTHROCYTE [DISTWIDTH] IN BLOOD BY AUTOMATED COUNT: 15.1 % (ref 10–15)
GFR SERPL CREATININE-BSD FRML MDRD: 77 ML/MIN/{1.73_M2}
GLUCOSE SERPL-MCNC: 114 MG/DL (ref 70–105)
HCT VFR BLD AUTO: 39.9 % (ref 40–53)
HGB BLD-MCNC: 13.6 G/DL (ref 13.3–17.7)
MCH RBC QN AUTO: 30.3 PG (ref 26.5–33)
MCHC RBC AUTO-ENTMCNC: 34.1 G/DL (ref 31.5–36.5)
MCV RBC AUTO: 89 FL (ref 78–100)
PLATELET # BLD AUTO: 108 10E9/L (ref 150–450)
POTASSIUM SERPL-SCNC: 4.3 MMOL/L (ref 3.5–5.1)
PROT SERPL-MCNC: 7 G/DL (ref 6.4–8.9)
RBC # BLD AUTO: 4.49 10E12/L (ref 4.4–5.9)
SODIUM SERPL-SCNC: 139 MMOL/L (ref 134–144)
WBC # BLD AUTO: 4.2 10E9/L (ref 4–11)

## 2019-01-08 PROCEDURE — 36415 COLL VENOUS BLD VENIPUNCTURE: CPT

## 2019-01-08 PROCEDURE — 85027 COMPLETE CBC AUTOMATED: CPT

## 2019-01-08 PROCEDURE — 80053 COMPREHEN METABOLIC PANEL: CPT

## 2019-01-21 DIAGNOSIS — Z94.4 LIVER REPLACED BY TRANSPLANT (H): ICD-10-CM

## 2019-01-21 LAB
ALBUMIN SERPL-MCNC: 4 G/DL (ref 3.5–5.7)
ALP SERPL-CCNC: 87 U/L (ref 34–104)
ALT SERPL W P-5'-P-CCNC: 13 U/L (ref 7–52)
ANION GAP SERPL CALCULATED.3IONS-SCNC: 9 MMOL/L (ref 3–14)
AST SERPL W P-5'-P-CCNC: 19 U/L (ref 13–39)
BILIRUB SERPL-MCNC: 1.2 MG/DL (ref 0.3–1)
BUN SERPL-MCNC: 18 MG/DL (ref 7–25)
CALCIUM SERPL-MCNC: 9.4 MG/DL (ref 8.6–10.3)
CHLORIDE SERPL-SCNC: 103 MMOL/L (ref 98–107)
CO2 SERPL-SCNC: 26 MMOL/L (ref 21–31)
CREAT SERPL-MCNC: 0.99 MG/DL (ref 0.7–1.3)
ERYTHROCYTE [DISTWIDTH] IN BLOOD BY AUTOMATED COUNT: 14.6 % (ref 10–15)
GFR SERPL CREATININE-BSD FRML MDRD: 77 ML/MIN/{1.73_M2}
GLUCOSE SERPL-MCNC: 96 MG/DL (ref 70–105)
HCT VFR BLD AUTO: 40 % (ref 40–53)
HGB BLD-MCNC: 13.7 G/DL (ref 13.3–17.7)
MCH RBC QN AUTO: 30 PG (ref 26.5–33)
MCHC RBC AUTO-ENTMCNC: 34.3 G/DL (ref 31.5–36.5)
MCV RBC AUTO: 88 FL (ref 78–100)
PLATELET # BLD AUTO: 111 10E9/L (ref 150–450)
POTASSIUM SERPL-SCNC: 4.5 MMOL/L (ref 3.5–5.1)
PROT SERPL-MCNC: 6.7 G/DL (ref 6.4–8.9)
RBC # BLD AUTO: 4.57 10E12/L (ref 4.4–5.9)
SODIUM SERPL-SCNC: 138 MMOL/L (ref 134–144)
WBC # BLD AUTO: 5.1 10E9/L (ref 4–11)

## 2019-01-21 PROCEDURE — 85027 COMPLETE CBC AUTOMATED: CPT

## 2019-01-21 PROCEDURE — 80053 COMPREHEN METABOLIC PANEL: CPT

## 2019-01-21 PROCEDURE — 36415 COLL VENOUS BLD VENIPUNCTURE: CPT

## 2019-02-04 DIAGNOSIS — Z94.4 LIVER REPLACED BY TRANSPLANT (H): ICD-10-CM

## 2019-02-04 LAB
ALBUMIN SERPL-MCNC: 4.1 G/DL (ref 3.5–5.7)
ALP SERPL-CCNC: 88 U/L (ref 34–104)
ALT SERPL W P-5'-P-CCNC: 11 U/L (ref 7–52)
ANION GAP SERPL CALCULATED.3IONS-SCNC: 5 MMOL/L (ref 3–14)
AST SERPL W P-5'-P-CCNC: 14 U/L (ref 13–39)
BILIRUB SERPL-MCNC: 1 MG/DL (ref 0.3–1)
BUN SERPL-MCNC: 23 MG/DL (ref 7–25)
CALCIUM SERPL-MCNC: 9.1 MG/DL (ref 8.6–10.3)
CHLORIDE SERPL-SCNC: 110 MMOL/L (ref 98–107)
CO2 SERPL-SCNC: 24 MMOL/L (ref 21–31)
CREAT SERPL-MCNC: 0.95 MG/DL (ref 0.7–1.3)
ERYTHROCYTE [DISTWIDTH] IN BLOOD BY AUTOMATED COUNT: 14.5 % (ref 10–15)
GFR SERPL CREATININE-BSD FRML MDRD: 81 ML/MIN/{1.73_M2}
GLUCOSE SERPL-MCNC: 107 MG/DL (ref 70–105)
HCT VFR BLD AUTO: 40.3 % (ref 40–53)
HGB BLD-MCNC: 14 G/DL (ref 13.3–17.7)
MCH RBC QN AUTO: 30.4 PG (ref 26.5–33)
MCHC RBC AUTO-ENTMCNC: 34.7 G/DL (ref 31.5–36.5)
MCV RBC AUTO: 88 FL (ref 78–100)
PLATELET # BLD AUTO: 130 10E9/L (ref 150–450)
POTASSIUM SERPL-SCNC: 4.7 MMOL/L (ref 3.5–5.1)
PROT SERPL-MCNC: 6.7 G/DL (ref 6.4–8.9)
RBC # BLD AUTO: 4.6 10E12/L (ref 4.4–5.9)
SODIUM SERPL-SCNC: 139 MMOL/L (ref 134–144)
WBC # BLD AUTO: 5 10E9/L (ref 4–11)

## 2019-02-04 PROCEDURE — 36415 COLL VENOUS BLD VENIPUNCTURE: CPT

## 2019-02-04 PROCEDURE — 80053 COMPREHEN METABOLIC PANEL: CPT

## 2019-02-04 PROCEDURE — 85027 COMPLETE CBC AUTOMATED: CPT

## 2019-02-06 DIAGNOSIS — Z87.891 HISTORY OF NICOTINE DEPENDENCE: Primary | ICD-10-CM

## 2019-02-07 NOTE — TELEPHONE ENCOUNTER
Refill request for nicotine patches.  Protocol fails due to BP and not on current medication list.  Routing to PCP to address refill.  Unable to complete prescription refill per RN Medication Refill Policy. May Chilel 2/7/2019 10:27 AM

## 2019-03-05 DIAGNOSIS — Z94.4 LIVER REPLACED BY TRANSPLANT (H): ICD-10-CM

## 2019-03-05 LAB
ALBUMIN SERPL-MCNC: 4 G/DL (ref 3.5–5.7)
ALP SERPL-CCNC: 72 U/L (ref 34–104)
ALT SERPL W P-5'-P-CCNC: 15 U/L (ref 7–52)
ANION GAP SERPL CALCULATED.3IONS-SCNC: 4 MMOL/L (ref 3–14)
AST SERPL W P-5'-P-CCNC: 14 U/L (ref 13–39)
BILIRUB SERPL-MCNC: 1.2 MG/DL (ref 0.3–1)
BUN SERPL-MCNC: 19 MG/DL (ref 7–25)
CALCIUM SERPL-MCNC: 9.1 MG/DL (ref 8.6–10.3)
CHLORIDE SERPL-SCNC: 108 MMOL/L (ref 98–107)
CO2 SERPL-SCNC: 25 MMOL/L (ref 21–31)
CREAT SERPL-MCNC: 1 MG/DL (ref 0.7–1.3)
ERYTHROCYTE [DISTWIDTH] IN BLOOD BY AUTOMATED COUNT: 15.6 % (ref 10–15)
GFR SERPL CREATININE-BSD FRML MDRD: 76 ML/MIN/{1.73_M2}
GLUCOSE SERPL-MCNC: 99 MG/DL (ref 70–105)
HCT VFR BLD AUTO: 42.3 % (ref 40–53)
HGB BLD-MCNC: 14.6 G/DL (ref 13.3–17.7)
MCH RBC QN AUTO: 31.1 PG (ref 26.5–33)
MCHC RBC AUTO-ENTMCNC: 34.5 G/DL (ref 31.5–36.5)
MCV RBC AUTO: 90 FL (ref 78–100)
PLATELET # BLD AUTO: 113 10E9/L (ref 150–450)
POTASSIUM SERPL-SCNC: 4 MMOL/L (ref 3.5–5.1)
PROT SERPL-MCNC: 6.8 G/DL (ref 6.4–8.9)
RBC # BLD AUTO: 4.69 10E12/L (ref 4.4–5.9)
SODIUM SERPL-SCNC: 137 MMOL/L (ref 134–144)
WBC # BLD AUTO: 5 10E9/L (ref 4–11)

## 2019-03-05 PROCEDURE — 36415 COLL VENOUS BLD VENIPUNCTURE: CPT

## 2019-03-05 PROCEDURE — 80053 COMPREHEN METABOLIC PANEL: CPT

## 2019-03-05 PROCEDURE — 85027 COMPLETE CBC AUTOMATED: CPT

## 2019-03-18 DIAGNOSIS — Z94.4 LIVER REPLACED BY TRANSPLANT (H): ICD-10-CM

## 2019-03-18 LAB
ALBUMIN SERPL-MCNC: 3.7 G/DL (ref 3.5–5.7)
ALP SERPL-CCNC: 67 U/L (ref 34–104)
ALT SERPL W P-5'-P-CCNC: 12 U/L (ref 7–52)
ANION GAP SERPL CALCULATED.3IONS-SCNC: 4 MMOL/L (ref 3–14)
AST SERPL W P-5'-P-CCNC: 12 U/L (ref 13–39)
BILIRUB SERPL-MCNC: 1.1 MG/DL (ref 0.3–1)
BUN SERPL-MCNC: 16 MG/DL (ref 7–25)
CALCIUM SERPL-MCNC: 8.8 MG/DL (ref 8.6–10.3)
CHLORIDE SERPL-SCNC: 108 MMOL/L (ref 98–107)
CO2 SERPL-SCNC: 26 MMOL/L (ref 21–31)
CREAT SERPL-MCNC: 0.96 MG/DL (ref 0.7–1.3)
ERYTHROCYTE [DISTWIDTH] IN BLOOD BY AUTOMATED COUNT: 15.9 % (ref 10–15)
GFR SERPL CREATININE-BSD FRML MDRD: 80 ML/MIN/{1.73_M2}
GLUCOSE SERPL-MCNC: 96 MG/DL (ref 70–105)
HCT VFR BLD AUTO: 43.8 % (ref 40–53)
HGB BLD-MCNC: 14.5 G/DL (ref 13.3–17.7)
MCH RBC QN AUTO: 31.7 PG (ref 26.5–33)
MCHC RBC AUTO-ENTMCNC: 33.1 G/DL (ref 31.5–36.5)
MCV RBC AUTO: 96 FL (ref 78–100)
PLATELET # BLD AUTO: 111 10E9/L (ref 150–450)
POTASSIUM SERPL-SCNC: 4.4 MMOL/L (ref 3.5–5.1)
PROT SERPL-MCNC: 6.3 G/DL (ref 6.4–8.9)
RBC # BLD AUTO: 4.58 10E12/L (ref 4.4–5.9)
SODIUM SERPL-SCNC: 138 MMOL/L (ref 134–144)
WBC # BLD AUTO: 4.3 10E9/L (ref 4–11)

## 2019-03-18 PROCEDURE — 80053 COMPREHEN METABOLIC PANEL: CPT

## 2019-03-18 PROCEDURE — 36415 COLL VENOUS BLD VENIPUNCTURE: CPT

## 2019-03-18 PROCEDURE — 85027 COMPLETE CBC AUTOMATED: CPT

## 2019-04-01 DIAGNOSIS — Z94.4 LIVER REPLACED BY TRANSPLANT (H): ICD-10-CM

## 2019-04-01 LAB
ALBUMIN SERPL-MCNC: 3.8 G/DL (ref 3.5–5.7)
ALP SERPL-CCNC: 77 U/L (ref 34–104)
ALT SERPL W P-5'-P-CCNC: 12 U/L (ref 7–52)
ANION GAP SERPL CALCULATED.3IONS-SCNC: 5 MMOL/L (ref 3–14)
AST SERPL W P-5'-P-CCNC: 14 U/L (ref 13–39)
BILIRUB SERPL-MCNC: 1.3 MG/DL (ref 0.3–1)
BUN SERPL-MCNC: 17 MG/DL (ref 7–25)
CALCIUM SERPL-MCNC: 8.9 MG/DL (ref 8.6–10.3)
CHLORIDE SERPL-SCNC: 107 MMOL/L (ref 98–107)
CO2 SERPL-SCNC: 27 MMOL/L (ref 21–31)
CREAT SERPL-MCNC: 0.92 MG/DL (ref 0.7–1.3)
ERYTHROCYTE [DISTWIDTH] IN BLOOD BY AUTOMATED COUNT: 15.7 % (ref 10–15)
GFR SERPL CREATININE-BSD FRML MDRD: 84 ML/MIN/{1.73_M2}
GLUCOSE SERPL-MCNC: 93 MG/DL (ref 70–105)
HCT VFR BLD AUTO: 42.5 % (ref 40–53)
HGB BLD-MCNC: 14.6 G/DL (ref 13.3–17.7)
MCH RBC QN AUTO: 31.4 PG (ref 26.5–33)
MCHC RBC AUTO-ENTMCNC: 34.4 G/DL (ref 31.5–36.5)
MCV RBC AUTO: 91 FL (ref 78–100)
PLATELET # BLD AUTO: 111 10E9/L (ref 150–450)
POTASSIUM SERPL-SCNC: 4 MMOL/L (ref 3.5–5.1)
PROT SERPL-MCNC: 6.2 G/DL (ref 6.4–8.9)
RBC # BLD AUTO: 4.65 10E12/L (ref 4.4–5.9)
SODIUM SERPL-SCNC: 139 MMOL/L (ref 134–144)
WBC # BLD AUTO: 4.5 10E9/L (ref 4–11)

## 2019-04-01 PROCEDURE — 36415 COLL VENOUS BLD VENIPUNCTURE: CPT

## 2019-04-01 PROCEDURE — 85027 COMPLETE CBC AUTOMATED: CPT

## 2019-04-01 PROCEDURE — 80053 COMPREHEN METABOLIC PANEL: CPT

## 2019-04-15 DIAGNOSIS — Z94.4 LIVER REPLACED BY TRANSPLANT (H): ICD-10-CM

## 2019-04-15 LAB
ALBUMIN SERPL-MCNC: 4 G/DL (ref 3.5–5.7)
ALP SERPL-CCNC: 82 U/L (ref 34–104)
ALT SERPL W P-5'-P-CCNC: 13 U/L (ref 7–52)
ANION GAP SERPL CALCULATED.3IONS-SCNC: 6 MMOL/L (ref 3–14)
AST SERPL W P-5'-P-CCNC: 15 U/L (ref 13–39)
BILIRUB SERPL-MCNC: 1.5 MG/DL (ref 0.3–1)
BUN SERPL-MCNC: 19 MG/DL (ref 7–25)
CALCIUM SERPL-MCNC: 9.3 MG/DL (ref 8.6–10.3)
CHLORIDE SERPL-SCNC: 108 MMOL/L (ref 98–107)
CO2 SERPL-SCNC: 25 MMOL/L (ref 21–31)
CREAT SERPL-MCNC: 0.99 MG/DL (ref 0.7–1.3)
ERYTHROCYTE [DISTWIDTH] IN BLOOD BY AUTOMATED COUNT: 14.8 % (ref 10–15)
GFR SERPL CREATININE-BSD FRML MDRD: 77 ML/MIN/{1.73_M2}
GLUCOSE SERPL-MCNC: 92 MG/DL (ref 70–105)
HCT VFR BLD AUTO: 42.8 % (ref 40–53)
HGB BLD-MCNC: 15.1 G/DL (ref 13.3–17.7)
MCH RBC QN AUTO: 32.1 PG (ref 26.5–33)
MCHC RBC AUTO-ENTMCNC: 35.3 G/DL (ref 31.5–36.5)
MCV RBC AUTO: 91 FL (ref 78–100)
PLATELET # BLD AUTO: 139 10E9/L (ref 150–450)
POTASSIUM SERPL-SCNC: 4.2 MMOL/L (ref 3.5–5.1)
PROT SERPL-MCNC: 6.5 G/DL (ref 6.4–8.9)
RBC # BLD AUTO: 4.7 10E12/L (ref 4.4–5.9)
SODIUM SERPL-SCNC: 139 MMOL/L (ref 134–144)
WBC # BLD AUTO: 6.3 10E9/L (ref 4–11)

## 2019-04-15 PROCEDURE — 85027 COMPLETE CBC AUTOMATED: CPT

## 2019-04-15 PROCEDURE — 36415 COLL VENOUS BLD VENIPUNCTURE: CPT

## 2019-04-15 PROCEDURE — 80053 COMPREHEN METABOLIC PANEL: CPT

## 2019-04-29 DIAGNOSIS — Z94.4 LIVER REPLACED BY TRANSPLANT (H): ICD-10-CM

## 2019-04-29 LAB
ALBUMIN SERPL-MCNC: 4 G/DL (ref 3.5–5.7)
ALP SERPL-CCNC: 80 U/L (ref 34–104)
ALT SERPL W P-5'-P-CCNC: 11 U/L (ref 7–52)
ANION GAP SERPL CALCULATED.3IONS-SCNC: 7 MMOL/L (ref 3–14)
AST SERPL W P-5'-P-CCNC: 13 U/L (ref 13–39)
BILIRUB SERPL-MCNC: 1.3 MG/DL (ref 0.3–1)
BUN SERPL-MCNC: 14 MG/DL (ref 7–25)
CALCIUM SERPL-MCNC: 9 MG/DL (ref 8.6–10.3)
CHLORIDE SERPL-SCNC: 106 MMOL/L (ref 98–107)
CO2 SERPL-SCNC: 27 MMOL/L (ref 21–31)
CREAT SERPL-MCNC: 1 MG/DL (ref 0.7–1.3)
ERYTHROCYTE [DISTWIDTH] IN BLOOD BY AUTOMATED COUNT: 14.4 % (ref 10–15)
GFR SERPL CREATININE-BSD FRML MDRD: 76 ML/MIN/{1.73_M2}
GLUCOSE SERPL-MCNC: 101 MG/DL (ref 70–105)
HCT VFR BLD AUTO: 41.8 % (ref 40–53)
HGB BLD-MCNC: 14.8 G/DL (ref 13.3–17.7)
MCH RBC QN AUTO: 32.3 PG (ref 26.5–33)
MCHC RBC AUTO-ENTMCNC: 35.4 G/DL (ref 31.5–36.5)
MCV RBC AUTO: 91 FL (ref 78–100)
PLATELET # BLD AUTO: 131 10E9/L (ref 150–450)
POTASSIUM SERPL-SCNC: 4.3 MMOL/L (ref 3.5–5.1)
PROT SERPL-MCNC: 6.2 G/DL (ref 6.4–8.9)
RBC # BLD AUTO: 4.58 10E12/L (ref 4.4–5.9)
SODIUM SERPL-SCNC: 140 MMOL/L (ref 134–144)
WBC # BLD AUTO: 5.3 10E9/L (ref 4–11)

## 2019-04-29 PROCEDURE — 85027 COMPLETE CBC AUTOMATED: CPT

## 2019-04-29 PROCEDURE — 80053 COMPREHEN METABOLIC PANEL: CPT

## 2019-04-29 PROCEDURE — 36415 COLL VENOUS BLD VENIPUNCTURE: CPT

## 2019-05-20 DIAGNOSIS — Z94.4 LIVER REPLACED BY TRANSPLANT (H): ICD-10-CM

## 2019-05-20 LAB
ALBUMIN SERPL-MCNC: 4.1 G/DL (ref 3.5–5.7)
ALP SERPL-CCNC: 90 U/L (ref 34–104)
ALT SERPL W P-5'-P-CCNC: 12 U/L (ref 7–52)
ANION GAP SERPL CALCULATED.3IONS-SCNC: 7 MMOL/L (ref 3–14)
AST SERPL W P-5'-P-CCNC: 14 U/L (ref 13–39)
BILIRUB SERPL-MCNC: 1.4 MG/DL (ref 0.3–1)
BUN SERPL-MCNC: 17 MG/DL (ref 7–25)
CALCIUM SERPL-MCNC: 9.1 MG/DL (ref 8.6–10.3)
CHLORIDE SERPL-SCNC: 108 MMOL/L (ref 98–107)
CO2 SERPL-SCNC: 25 MMOL/L (ref 21–31)
CREAT SERPL-MCNC: 0.93 MG/DL (ref 0.7–1.3)
ERYTHROCYTE [DISTWIDTH] IN BLOOD BY AUTOMATED COUNT: 13.9 % (ref 10–15)
GFR SERPL CREATININE-BSD FRML MDRD: 83 ML/MIN/{1.73_M2}
GLUCOSE SERPL-MCNC: 106 MG/DL (ref 70–105)
HCT VFR BLD AUTO: 41.2 % (ref 40–53)
HGB BLD-MCNC: 14.7 G/DL (ref 13.3–17.7)
MCH RBC QN AUTO: 32.2 PG (ref 26.5–33)
MCHC RBC AUTO-ENTMCNC: 35.7 G/DL (ref 31.5–36.5)
MCV RBC AUTO: 90 FL (ref 78–100)
PLATELET # BLD AUTO: 132 10E9/L (ref 150–450)
POTASSIUM SERPL-SCNC: 4 MMOL/L (ref 3.5–5.1)
PROT SERPL-MCNC: 6.1 G/DL (ref 6.4–8.9)
RBC # BLD AUTO: 4.56 10E12/L (ref 4.4–5.9)
SODIUM SERPL-SCNC: 140 MMOL/L (ref 134–144)
WBC # BLD AUTO: 5.2 10E9/L (ref 4–11)

## 2019-05-20 PROCEDURE — 80053 COMPREHEN METABOLIC PANEL: CPT

## 2019-05-20 PROCEDURE — 85027 COMPLETE CBC AUTOMATED: CPT

## 2019-05-20 PROCEDURE — 36415 COLL VENOUS BLD VENIPUNCTURE: CPT

## 2019-06-03 DIAGNOSIS — Z94.4 LIVER REPLACED BY TRANSPLANT (H): ICD-10-CM

## 2019-06-03 LAB
ALBUMIN SERPL-MCNC: 4.1 G/DL (ref 3.5–5.7)
ALP SERPL-CCNC: 91 U/L (ref 34–104)
ALT SERPL W P-5'-P-CCNC: 11 U/L (ref 7–52)
ANION GAP SERPL CALCULATED.3IONS-SCNC: 5 MMOL/L (ref 3–14)
AST SERPL W P-5'-P-CCNC: 14 U/L (ref 13–39)
BILIRUB SERPL-MCNC: 1.2 MG/DL (ref 0.3–1)
BUN SERPL-MCNC: 14 MG/DL (ref 7–25)
CALCIUM SERPL-MCNC: 9.4 MG/DL (ref 8.6–10.3)
CHLORIDE SERPL-SCNC: 104 MMOL/L (ref 98–107)
CO2 SERPL-SCNC: 28 MMOL/L (ref 21–31)
CREAT SERPL-MCNC: 0.99 MG/DL (ref 0.7–1.3)
ERYTHROCYTE [DISTWIDTH] IN BLOOD BY AUTOMATED COUNT: 13.2 % (ref 10–15)
GFR SERPL CREATININE-BSD FRML MDRD: 77 ML/MIN/{1.73_M2}
GLUCOSE SERPL-MCNC: 102 MG/DL (ref 70–105)
HCT VFR BLD AUTO: 42.4 % (ref 40–53)
HGB BLD-MCNC: 15 G/DL (ref 13.3–17.7)
MCH RBC QN AUTO: 32.2 PG (ref 26.5–33)
MCHC RBC AUTO-ENTMCNC: 35.4 G/DL (ref 31.5–36.5)
MCV RBC AUTO: 91 FL (ref 78–100)
PLATELET # BLD AUTO: 134 10E9/L (ref 150–450)
POTASSIUM SERPL-SCNC: 4.5 MMOL/L (ref 3.5–5.1)
PROT SERPL-MCNC: 6.5 G/DL (ref 6.4–8.9)
RBC # BLD AUTO: 4.66 10E12/L (ref 4.4–5.9)
SODIUM SERPL-SCNC: 137 MMOL/L (ref 134–144)
WBC # BLD AUTO: 5.7 10E9/L (ref 4–11)

## 2019-06-03 PROCEDURE — 85027 COMPLETE CBC AUTOMATED: CPT

## 2019-06-03 PROCEDURE — 80053 COMPREHEN METABOLIC PANEL: CPT

## 2019-06-03 PROCEDURE — 36415 COLL VENOUS BLD VENIPUNCTURE: CPT

## 2019-06-17 DIAGNOSIS — Z94.4 LIVER REPLACED BY TRANSPLANT (H): ICD-10-CM

## 2019-06-17 LAB
ALBUMIN SERPL-MCNC: 4 G/DL (ref 3.5–5.7)
ALP SERPL-CCNC: 81 U/L (ref 34–104)
ALT SERPL W P-5'-P-CCNC: 11 U/L (ref 7–52)
ANION GAP SERPL CALCULATED.3IONS-SCNC: 7 MMOL/L (ref 3–14)
AST SERPL W P-5'-P-CCNC: 14 U/L (ref 13–39)
BILIRUB SERPL-MCNC: 1.1 MG/DL (ref 0.3–1)
BUN SERPL-MCNC: 16 MG/DL (ref 7–25)
CALCIUM SERPL-MCNC: 9.2 MG/DL (ref 8.6–10.3)
CHLORIDE SERPL-SCNC: 107 MMOL/L (ref 98–107)
CO2 SERPL-SCNC: 25 MMOL/L (ref 21–31)
CREAT SERPL-MCNC: 0.94 MG/DL (ref 0.7–1.3)
ERYTHROCYTE [DISTWIDTH] IN BLOOD BY AUTOMATED COUNT: 13 % (ref 10–15)
GFR SERPL CREATININE-BSD FRML MDRD: 82 ML/MIN/{1.73_M2}
GLUCOSE SERPL-MCNC: 100 MG/DL (ref 70–105)
HCT VFR BLD AUTO: 42.5 % (ref 40–53)
HGB BLD-MCNC: 15 G/DL (ref 13.3–17.7)
MCH RBC QN AUTO: 31.8 PG (ref 26.5–33)
MCHC RBC AUTO-ENTMCNC: 35.3 G/DL (ref 31.5–36.5)
MCV RBC AUTO: 90 FL (ref 78–100)
PLATELET # BLD AUTO: 133 10E9/L (ref 150–450)
POTASSIUM SERPL-SCNC: 4.2 MMOL/L (ref 3.5–5.1)
PROT SERPL-MCNC: 6.3 G/DL (ref 6.4–8.9)
RBC # BLD AUTO: 4.72 10E12/L (ref 4.4–5.9)
SODIUM SERPL-SCNC: 139 MMOL/L (ref 134–144)
WBC # BLD AUTO: 5.1 10E9/L (ref 4–11)

## 2019-06-17 PROCEDURE — 85027 COMPLETE CBC AUTOMATED: CPT

## 2019-06-17 PROCEDURE — 80053 COMPREHEN METABOLIC PANEL: CPT

## 2019-06-17 PROCEDURE — 36415 COLL VENOUS BLD VENIPUNCTURE: CPT

## 2019-06-27 ENCOUNTER — TRANSFERRED RECORDS (OUTPATIENT)
Dept: HEALTH INFORMATION MANAGEMENT | Facility: OTHER | Age: 64
End: 2019-06-27

## 2019-06-28 ENCOUNTER — TRANSFERRED RECORDS (OUTPATIENT)
Dept: HEALTH INFORMATION MANAGEMENT | Facility: OTHER | Age: 64
End: 2019-06-28

## 2019-07-15 DIAGNOSIS — Z94.4 LIVER REPLACED BY TRANSPLANT (H): ICD-10-CM

## 2019-07-15 LAB
ALBUMIN SERPL-MCNC: 4 G/DL (ref 3.5–5.7)
ALP SERPL-CCNC: 84 U/L (ref 34–104)
ALT SERPL W P-5'-P-CCNC: 10 U/L (ref 7–52)
ANION GAP SERPL CALCULATED.3IONS-SCNC: 5 MMOL/L (ref 3–14)
AST SERPL W P-5'-P-CCNC: 13 U/L (ref 13–39)
BILIRUB SERPL-MCNC: 1.3 MG/DL (ref 0.3–1)
BUN SERPL-MCNC: 13 MG/DL (ref 7–25)
CALCIUM SERPL-MCNC: 8.9 MG/DL (ref 8.6–10.3)
CHLORIDE SERPL-SCNC: 107 MMOL/L (ref 98–107)
CO2 SERPL-SCNC: 27 MMOL/L (ref 21–31)
CREAT SERPL-MCNC: 0.98 MG/DL (ref 0.7–1.3)
ERYTHROCYTE [DISTWIDTH] IN BLOOD BY AUTOMATED COUNT: 13.9 % (ref 10–15)
GFR SERPL CREATININE-BSD FRML MDRD: 78 ML/MIN/{1.73_M2}
GLUCOSE SERPL-MCNC: 109 MG/DL (ref 70–105)
HCT VFR BLD AUTO: 42.2 % (ref 40–53)
HGB BLD-MCNC: 14.7 G/DL (ref 13.3–17.7)
MCH RBC QN AUTO: 31.7 PG (ref 26.5–33)
MCHC RBC AUTO-ENTMCNC: 34.8 G/DL (ref 31.5–36.5)
MCV RBC AUTO: 91 FL (ref 78–100)
PLATELET # BLD AUTO: 132 10E9/L (ref 150–450)
POTASSIUM SERPL-SCNC: 3.7 MMOL/L (ref 3.5–5.1)
PROT SERPL-MCNC: 6.1 G/DL (ref 6.4–8.9)
RBC # BLD AUTO: 4.63 10E12/L (ref 4.4–5.9)
SODIUM SERPL-SCNC: 139 MMOL/L (ref 134–144)
WBC # BLD AUTO: 5.2 10E9/L (ref 4–11)

## 2019-07-15 PROCEDURE — 80053 COMPREHEN METABOLIC PANEL: CPT

## 2019-07-15 PROCEDURE — 36415 COLL VENOUS BLD VENIPUNCTURE: CPT

## 2019-07-15 PROCEDURE — 85027 COMPLETE CBC AUTOMATED: CPT

## 2019-07-29 ENCOUNTER — MEDICAL CORRESPONDENCE (OUTPATIENT)
Dept: HEALTH INFORMATION MANAGEMENT | Facility: OTHER | Age: 64
End: 2019-07-29

## 2019-07-29 DIAGNOSIS — Z94.4 LIVER REPLACED BY TRANSPLANT (H): Primary | ICD-10-CM

## 2019-07-29 LAB
ALBUMIN SERPL-MCNC: 4 G/DL (ref 3.5–5.7)
ALP SERPL-CCNC: 85 U/L (ref 34–104)
ALT SERPL W P-5'-P-CCNC: 10 U/L (ref 7–52)
ANION GAP SERPL CALCULATED.3IONS-SCNC: 7 MMOL/L (ref 3–14)
AST SERPL W P-5'-P-CCNC: 16 U/L (ref 13–39)
BILIRUB SERPL-MCNC: 1.2 MG/DL (ref 0.3–1)
BUN SERPL-MCNC: 14 MG/DL (ref 7–25)
CALCIUM SERPL-MCNC: 8.8 MG/DL (ref 8.6–10.3)
CHLORIDE SERPL-SCNC: 107 MMOL/L (ref 98–107)
CO2 SERPL-SCNC: 25 MMOL/L (ref 21–31)
CREAT SERPL-MCNC: 0.92 MG/DL (ref 0.7–1.3)
ERYTHROCYTE [DISTWIDTH] IN BLOOD BY AUTOMATED COUNT: 13.9 % (ref 10–15)
GFR SERPL CREATININE-BSD FRML MDRD: 84 ML/MIN/{1.73_M2}
GLUCOSE SERPL-MCNC: 90 MG/DL (ref 70–105)
HCT VFR BLD AUTO: 41.8 % (ref 40–53)
HGB BLD-MCNC: 14.7 G/DL (ref 13.3–17.7)
MCH RBC QN AUTO: 31.3 PG (ref 26.5–33)
MCHC RBC AUTO-ENTMCNC: 35.2 G/DL (ref 31.5–36.5)
MCV RBC AUTO: 89 FL (ref 78–100)
PLATELET # BLD AUTO: 131 10E9/L (ref 150–450)
POTASSIUM SERPL-SCNC: 3.9 MMOL/L (ref 3.5–5.1)
PROT SERPL-MCNC: 6.3 G/DL (ref 6.4–8.9)
RBC # BLD AUTO: 4.7 10E12/L (ref 4.4–5.9)
SODIUM SERPL-SCNC: 139 MMOL/L (ref 134–144)
WBC # BLD AUTO: 5.1 10E9/L (ref 4–11)

## 2019-07-29 PROCEDURE — 85027 COMPLETE CBC AUTOMATED: CPT

## 2019-07-29 PROCEDURE — 80053 COMPREHEN METABOLIC PANEL: CPT

## 2019-07-29 PROCEDURE — 36415 COLL VENOUS BLD VENIPUNCTURE: CPT

## 2019-08-12 DIAGNOSIS — Z94.4 LIVER REPLACED BY TRANSPLANT (H): ICD-10-CM

## 2019-08-12 LAB
ALBUMIN SERPL-MCNC: 4.1 G/DL (ref 3.5–5.7)
ALP SERPL-CCNC: 82 U/L (ref 34–104)
ALT SERPL W P-5'-P-CCNC: 11 U/L (ref 7–52)
ANION GAP SERPL CALCULATED.3IONS-SCNC: 7 MMOL/L (ref 3–14)
AST SERPL W P-5'-P-CCNC: 13 U/L (ref 13–39)
BILIRUB SERPL-MCNC: 1.1 MG/DL (ref 0.3–1)
BUN SERPL-MCNC: 14 MG/DL (ref 7–25)
CALCIUM SERPL-MCNC: 8.8 MG/DL (ref 8.6–10.3)
CHLORIDE SERPL-SCNC: 108 MMOL/L (ref 98–107)
CO2 SERPL-SCNC: 25 MMOL/L (ref 21–31)
CREAT SERPL-MCNC: 1.04 MG/DL (ref 0.7–1.3)
ERYTHROCYTE [DISTWIDTH] IN BLOOD BY AUTOMATED COUNT: 13.7 % (ref 10–15)
GFR SERPL CREATININE-BSD FRML MDRD: 72 ML/MIN/{1.73_M2}
GLUCOSE SERPL-MCNC: 111 MG/DL (ref 70–105)
HCT VFR BLD AUTO: 43 % (ref 40–53)
HGB BLD-MCNC: 14.9 G/DL (ref 13.3–17.7)
MCH RBC QN AUTO: 31.3 PG (ref 26.5–33)
MCHC RBC AUTO-ENTMCNC: 34.7 G/DL (ref 31.5–36.5)
MCV RBC AUTO: 90 FL (ref 78–100)
PLATELET # BLD AUTO: 131 10E9/L (ref 150–450)
POTASSIUM SERPL-SCNC: 4 MMOL/L (ref 3.5–5.1)
PROT SERPL-MCNC: 6.2 G/DL (ref 6.4–8.9)
RBC # BLD AUTO: 4.76 10E12/L (ref 4.4–5.9)
SODIUM SERPL-SCNC: 140 MMOL/L (ref 134–144)
WBC # BLD AUTO: 5.1 10E9/L (ref 4–11)

## 2019-08-12 PROCEDURE — 36415 COLL VENOUS BLD VENIPUNCTURE: CPT

## 2019-08-12 PROCEDURE — 85027 COMPLETE CBC AUTOMATED: CPT

## 2019-08-12 PROCEDURE — 80053 COMPREHEN METABOLIC PANEL: CPT

## 2019-08-19 DIAGNOSIS — Z94.4 LIVER REPLACED BY TRANSPLANT (H): ICD-10-CM

## 2019-08-19 LAB
ALBUMIN SERPL-MCNC: 4.1 G/DL (ref 3.5–5.7)
ALP SERPL-CCNC: 89 U/L (ref 34–104)
ALT SERPL W P-5'-P-CCNC: 10 U/L (ref 7–52)
ANION GAP SERPL CALCULATED.3IONS-SCNC: 7 MMOL/L (ref 3–14)
AST SERPL W P-5'-P-CCNC: 14 U/L (ref 13–39)
BILIRUB SERPL-MCNC: 1.2 MG/DL (ref 0.3–1)
BUN SERPL-MCNC: 17 MG/DL (ref 7–25)
CALCIUM SERPL-MCNC: 9.2 MG/DL (ref 8.6–10.3)
CHLORIDE SERPL-SCNC: 107 MMOL/L (ref 98–107)
CO2 SERPL-SCNC: 26 MMOL/L (ref 21–31)
CREAT SERPL-MCNC: 1 MG/DL (ref 0.7–1.3)
ERYTHROCYTE [DISTWIDTH] IN BLOOD BY AUTOMATED COUNT: 13.7 % (ref 10–15)
GFR SERPL CREATININE-BSD FRML MDRD: 76 ML/MIN/{1.73_M2}
GLUCOSE SERPL-MCNC: 98 MG/DL (ref 70–105)
HCT VFR BLD AUTO: 43.7 % (ref 40–53)
HGB BLD-MCNC: 15.4 G/DL (ref 13.3–17.7)
MCH RBC QN AUTO: 31.7 PG (ref 26.5–33)
MCHC RBC AUTO-ENTMCNC: 35.2 G/DL (ref 31.5–36.5)
MCV RBC AUTO: 90 FL (ref 78–100)
PLATELET # BLD AUTO: 138 10E9/L (ref 150–450)
POTASSIUM SERPL-SCNC: 3.9 MMOL/L (ref 3.5–5.1)
PROT SERPL-MCNC: 6.2 G/DL (ref 6.4–8.9)
RBC # BLD AUTO: 4.86 10E12/L (ref 4.4–5.9)
SODIUM SERPL-SCNC: 140 MMOL/L (ref 134–144)
WBC # BLD AUTO: 5 10E9/L (ref 4–11)

## 2019-08-19 PROCEDURE — 85027 COMPLETE CBC AUTOMATED: CPT

## 2019-08-19 PROCEDURE — 36415 COLL VENOUS BLD VENIPUNCTURE: CPT

## 2019-08-19 PROCEDURE — 80053 COMPREHEN METABOLIC PANEL: CPT

## 2019-08-27 ENCOUNTER — TELEPHONE (OUTPATIENT)
Dept: FAMILY MEDICINE | Facility: OTHER | Age: 64
End: 2019-08-27

## 2019-08-27 DIAGNOSIS — G25.81 RESTLESS LEG SYNDROME: ICD-10-CM

## 2019-08-27 NOTE — TELEPHONE ENCOUNTER
States that he is needing a refill of medication that he used to get from another provider so he's needing a new prescription

## 2019-08-29 PROBLEM — G25.81 RESTLESS LEG SYNDROME: Status: ACTIVE | Noted: 2019-08-29

## 2019-08-29 RX ORDER — PRAMIPEXOLE DIHYDROCHLORIDE 0.25 MG/1
0.5 TABLET ORAL AT BEDTIME
Qty: 180 TABLET | Refills: 3 | Status: SHIPPED | OUTPATIENT
Start: 2019-08-29 | End: 2020-02-26

## 2019-08-29 NOTE — TELEPHONE ENCOUNTER
Patient needs new script by 8/29/19** Take 2 tablets by mouth every night at bedtime, #180. Per pharmacy.  Norma J. Gosselin, LPN LPN......  8/29/2019   1:38 PM    Med nydia'd up.

## 2019-09-04 DIAGNOSIS — Z94.4 LIVER REPLACED BY TRANSPLANT (H): ICD-10-CM

## 2019-09-04 LAB
ALBUMIN SERPL-MCNC: 4 G/DL (ref 3.5–5.7)
ALP SERPL-CCNC: 90 U/L (ref 34–104)
ALT SERPL W P-5'-P-CCNC: 10 U/L (ref 7–52)
ANION GAP SERPL CALCULATED.3IONS-SCNC: 6 MMOL/L (ref 3–14)
AST SERPL W P-5'-P-CCNC: 13 U/L (ref 13–39)
BILIRUB SERPL-MCNC: 1.1 MG/DL (ref 0.3–1)
BUN SERPL-MCNC: 11 MG/DL (ref 7–25)
CALCIUM SERPL-MCNC: 9.1 MG/DL (ref 8.6–10.3)
CHLORIDE SERPL-SCNC: 104 MMOL/L (ref 98–107)
CO2 SERPL-SCNC: 30 MMOL/L (ref 21–31)
CREAT SERPL-MCNC: 0.97 MG/DL (ref 0.7–1.3)
ERYTHROCYTE [DISTWIDTH] IN BLOOD BY AUTOMATED COUNT: 14.1 % (ref 10–15)
GFR SERPL CREATININE-BSD FRML MDRD: 78 ML/MIN/{1.73_M2}
GLUCOSE SERPL-MCNC: 97 MG/DL (ref 70–105)
HCT VFR BLD AUTO: 44.1 % (ref 40–53)
HGB BLD-MCNC: 15.2 G/DL (ref 13.3–17.7)
MCH RBC QN AUTO: 31 PG (ref 26.5–33)
MCHC RBC AUTO-ENTMCNC: 34.5 G/DL (ref 31.5–36.5)
MCV RBC AUTO: 90 FL (ref 78–100)
PLATELET # BLD AUTO: 135 10E9/L (ref 150–450)
POTASSIUM SERPL-SCNC: 4 MMOL/L (ref 3.5–5.1)
PROT SERPL-MCNC: 6.1 G/DL (ref 6.4–8.9)
RBC # BLD AUTO: 4.9 10E12/L (ref 4.4–5.9)
SODIUM SERPL-SCNC: 140 MMOL/L (ref 134–144)
WBC # BLD AUTO: 5 10E9/L (ref 4–11)

## 2019-09-04 PROCEDURE — 36415 COLL VENOUS BLD VENIPUNCTURE: CPT

## 2019-09-04 PROCEDURE — 80053 COMPREHEN METABOLIC PANEL: CPT

## 2019-09-04 PROCEDURE — 85027 COMPLETE CBC AUTOMATED: CPT

## 2019-09-16 DIAGNOSIS — Z94.4 LIVER REPLACED BY TRANSPLANT (H): ICD-10-CM

## 2019-09-16 LAB
ALBUMIN SERPL-MCNC: 4.2 G/DL (ref 3.5–5.7)
ALP SERPL-CCNC: 102 U/L (ref 34–104)
ALT SERPL W P-5'-P-CCNC: 11 U/L (ref 7–52)
ANION GAP SERPL CALCULATED.3IONS-SCNC: 10 MMOL/L (ref 3–14)
AST SERPL W P-5'-P-CCNC: 15 U/L (ref 13–39)
BILIRUB SERPL-MCNC: 1.4 MG/DL (ref 0.3–1)
BUN SERPL-MCNC: 15 MG/DL (ref 7–25)
CALCIUM SERPL-MCNC: 9.1 MG/DL (ref 8.6–10.3)
CHLORIDE SERPL-SCNC: 104 MMOL/L (ref 98–107)
CO2 SERPL-SCNC: 27 MMOL/L (ref 21–31)
CREAT SERPL-MCNC: 0.98 MG/DL (ref 0.7–1.3)
ERYTHROCYTE [DISTWIDTH] IN BLOOD BY AUTOMATED COUNT: 13.7 % (ref 10–15)
GFR SERPL CREATININE-BSD FRML MDRD: 78 ML/MIN/{1.73_M2}
GLUCOSE SERPL-MCNC: 107 MG/DL (ref 70–105)
HCT VFR BLD AUTO: 43.7 % (ref 40–53)
HGB BLD-MCNC: 15.4 G/DL (ref 13.3–17.7)
MCH RBC QN AUTO: 31.2 PG (ref 26.5–33)
MCHC RBC AUTO-ENTMCNC: 35.2 G/DL (ref 31.5–36.5)
MCV RBC AUTO: 89 FL (ref 78–100)
PLATELET # BLD AUTO: 144 10E9/L (ref 150–450)
POTASSIUM SERPL-SCNC: 3.7 MMOL/L (ref 3.5–5.1)
PROT SERPL-MCNC: 6.4 G/DL (ref 6.4–8.9)
RBC # BLD AUTO: 4.94 10E12/L (ref 4.4–5.9)
SODIUM SERPL-SCNC: 141 MMOL/L (ref 134–144)
WBC # BLD AUTO: 5.7 10E9/L (ref 4–11)

## 2019-09-16 PROCEDURE — 80053 COMPREHEN METABOLIC PANEL: CPT | Mod: ZL

## 2019-09-16 PROCEDURE — 36415 COLL VENOUS BLD VENIPUNCTURE: CPT | Mod: ZL

## 2019-09-16 PROCEDURE — 85027 COMPLETE CBC AUTOMATED: CPT | Mod: ZL

## 2019-10-14 DIAGNOSIS — Z94.4 TRANSPLANTED LIVER (H): ICD-10-CM

## 2019-10-14 LAB
ALBUMIN SERPL-MCNC: 4.2 G/DL (ref 3.5–5.7)
ALP SERPL-CCNC: 88 U/L (ref 34–104)
ALT SERPL W P-5'-P-CCNC: 9 U/L (ref 7–52)
ANION GAP SERPL CALCULATED.3IONS-SCNC: 9 MMOL/L (ref 3–14)
AST SERPL W P-5'-P-CCNC: 14 U/L (ref 13–39)
BILIRUB SERPL-MCNC: 1.5 MG/DL (ref 0.3–1)
BUN SERPL-MCNC: 15 MG/DL (ref 7–25)
CALCIUM SERPL-MCNC: 9.1 MG/DL (ref 8.6–10.3)
CHLORIDE SERPL-SCNC: 103 MMOL/L (ref 98–107)
CO2 SERPL-SCNC: 27 MMOL/L (ref 21–31)
CREAT SERPL-MCNC: 1 MG/DL (ref 0.7–1.3)
ERYTHROCYTE [DISTWIDTH] IN BLOOD BY AUTOMATED COUNT: 13.5 % (ref 10–15)
GFR SERPL CREATININE-BSD FRML MDRD: 76 ML/MIN/{1.73_M2}
GLUCOSE SERPL-MCNC: 109 MG/DL (ref 70–105)
HCT VFR BLD AUTO: 45.2 % (ref 40–53)
HGB BLD-MCNC: 15.5 G/DL (ref 13.3–17.7)
MCH RBC QN AUTO: 30.7 PG (ref 26.5–33)
MCHC RBC AUTO-ENTMCNC: 34.3 G/DL (ref 31.5–36.5)
MCV RBC AUTO: 90 FL (ref 78–100)
PLATELET # BLD AUTO: 141 10E9/L (ref 150–450)
POTASSIUM SERPL-SCNC: 4 MMOL/L (ref 3.5–5.1)
PROT SERPL-MCNC: 6.6 G/DL (ref 6.4–8.9)
RBC # BLD AUTO: 5.05 10E12/L (ref 4.4–5.9)
SODIUM SERPL-SCNC: 139 MMOL/L (ref 134–144)
WBC # BLD AUTO: 4.8 10E9/L (ref 4–11)

## 2019-10-14 PROCEDURE — 80053 COMPREHEN METABOLIC PANEL: CPT | Mod: ZL

## 2019-10-14 PROCEDURE — 85027 COMPLETE CBC AUTOMATED: CPT | Mod: ZL

## 2019-10-14 PROCEDURE — 36415 COLL VENOUS BLD VENIPUNCTURE: CPT | Mod: ZL

## 2019-11-11 DIAGNOSIS — Z94.4 TRANSPLANTED LIVER (H): ICD-10-CM

## 2019-11-11 LAB
ALBUMIN SERPL-MCNC: 3.9 G/DL (ref 3.5–5.7)
ALP SERPL-CCNC: 101 U/L (ref 34–104)
ALT SERPL W P-5'-P-CCNC: 10 U/L (ref 7–52)
ANION GAP SERPL CALCULATED.3IONS-SCNC: 8 MMOL/L (ref 3–14)
AST SERPL W P-5'-P-CCNC: 13 U/L (ref 13–39)
BILIRUB SERPL-MCNC: 0.9 MG/DL (ref 0.3–1)
BUN SERPL-MCNC: 15 MG/DL (ref 7–25)
CALCIUM SERPL-MCNC: 9.1 MG/DL (ref 8.6–10.3)
CHLORIDE SERPL-SCNC: 106 MMOL/L (ref 98–107)
CO2 SERPL-SCNC: 26 MMOL/L (ref 21–31)
CREAT SERPL-MCNC: 0.9 MG/DL (ref 0.7–1.3)
ERYTHROCYTE [DISTWIDTH] IN BLOOD BY AUTOMATED COUNT: 13.9 % (ref 10–15)
GFR SERPL CREATININE-BSD FRML MDRD: 86 ML/MIN/{1.73_M2}
GLUCOSE SERPL-MCNC: 171 MG/DL (ref 70–105)
HCT VFR BLD AUTO: 44.8 % (ref 40–53)
HGB BLD-MCNC: 15.3 G/DL (ref 13.3–17.7)
MCH RBC QN AUTO: 30.8 PG (ref 26.5–33)
MCHC RBC AUTO-ENTMCNC: 34.2 G/DL (ref 31.5–36.5)
MCV RBC AUTO: 90 FL (ref 78–100)
PLATELET # BLD AUTO: 129 10E9/L (ref 150–450)
POTASSIUM SERPL-SCNC: 4 MMOL/L (ref 3.5–5.1)
PROT SERPL-MCNC: 6.9 G/DL (ref 6.4–8.9)
RBC # BLD AUTO: 4.97 10E12/L (ref 4.4–5.9)
SODIUM SERPL-SCNC: 140 MMOL/L (ref 134–144)
WBC # BLD AUTO: 4.8 10E9/L (ref 4–11)

## 2019-11-11 PROCEDURE — 85027 COMPLETE CBC AUTOMATED: CPT | Mod: ZL

## 2019-11-11 PROCEDURE — 80053 COMPREHEN METABOLIC PANEL: CPT | Mod: ZL

## 2019-11-11 PROCEDURE — 36415 COLL VENOUS BLD VENIPUNCTURE: CPT | Mod: ZL

## 2019-12-09 DIAGNOSIS — Z94.4 TRANSPLANTED LIVER (H): ICD-10-CM

## 2019-12-09 LAB
ALBUMIN SERPL-MCNC: 4.4 G/DL (ref 3.5–5.7)
ALP SERPL-CCNC: 97 U/L (ref 34–104)
ALT SERPL W P-5'-P-CCNC: 10 U/L (ref 7–52)
ANION GAP SERPL CALCULATED.3IONS-SCNC: 9 MMOL/L (ref 3–14)
AST SERPL W P-5'-P-CCNC: 14 U/L (ref 13–39)
BILIRUB SERPL-MCNC: 2 MG/DL (ref 0.3–1)
BUN SERPL-MCNC: 22 MG/DL (ref 7–25)
CALCIUM SERPL-MCNC: 9.6 MG/DL (ref 8.6–10.3)
CHLORIDE SERPL-SCNC: 106 MMOL/L (ref 98–107)
CO2 SERPL-SCNC: 25 MMOL/L (ref 21–31)
CREAT SERPL-MCNC: 1.25 MG/DL (ref 0.7–1.3)
ERYTHROCYTE [DISTWIDTH] IN BLOOD BY AUTOMATED COUNT: 13.6 % (ref 10–15)
GFR SERPL CREATININE-BSD FRML MDRD: 59 ML/MIN/{1.73_M2}
GLUCOSE SERPL-MCNC: 133 MG/DL (ref 70–105)
HCT VFR BLD AUTO: 48.1 % (ref 40–53)
HGB BLD-MCNC: 16.5 G/DL (ref 13.3–17.7)
MCH RBC QN AUTO: 30.9 PG (ref 26.5–33)
MCHC RBC AUTO-ENTMCNC: 34.3 G/DL (ref 31.5–36.5)
MCV RBC AUTO: 90 FL (ref 78–100)
PLATELET # BLD AUTO: 154 10E9/L (ref 150–450)
POTASSIUM SERPL-SCNC: 3.8 MMOL/L (ref 3.5–5.1)
PROT SERPL-MCNC: 7.2 G/DL (ref 6.4–8.9)
RBC # BLD AUTO: 5.34 10E12/L (ref 4.4–5.9)
SODIUM SERPL-SCNC: 140 MMOL/L (ref 134–144)
WBC # BLD AUTO: 5.6 10E9/L (ref 4–11)

## 2019-12-09 PROCEDURE — 36415 COLL VENOUS BLD VENIPUNCTURE: CPT | Mod: ZL

## 2019-12-09 PROCEDURE — 85027 COMPLETE CBC AUTOMATED: CPT | Mod: ZL

## 2019-12-09 PROCEDURE — 80053 COMPREHEN METABOLIC PANEL: CPT | Mod: ZL

## 2020-01-06 ENCOUNTER — MEDICAL CORRESPONDENCE (OUTPATIENT)
Dept: HEALTH INFORMATION MANAGEMENT | Facility: OTHER | Age: 65
End: 2020-01-06

## 2020-01-06 DIAGNOSIS — Z94.4 TRANSPLANTED LIVER (H): ICD-10-CM

## 2020-01-06 LAB
ALBUMIN SERPL-MCNC: 4.3 G/DL (ref 3.5–5.7)
ALP SERPL-CCNC: 95 U/L (ref 34–104)
ALT SERPL W P-5'-P-CCNC: 9 U/L (ref 7–52)
ANION GAP SERPL CALCULATED.3IONS-SCNC: 8 MMOL/L (ref 3–14)
AST SERPL W P-5'-P-CCNC: 12 U/L (ref 13–39)
BILIRUB SERPL-MCNC: 1.1 MG/DL (ref 0.3–1)
BUN SERPL-MCNC: 13 MG/DL (ref 7–25)
CALCIUM SERPL-MCNC: 9.1 MG/DL (ref 8.6–10.3)
CHLORIDE SERPL-SCNC: 105 MMOL/L (ref 98–107)
CO2 SERPL-SCNC: 26 MMOL/L (ref 21–31)
CREAT SERPL-MCNC: 1.01 MG/DL (ref 0.7–1.3)
ERYTHROCYTE [DISTWIDTH] IN BLOOD BY AUTOMATED COUNT: 13.1 % (ref 10–15)
GFR SERPL CREATININE-BSD FRML MDRD: 75 ML/MIN/{1.73_M2}
GLUCOSE SERPL-MCNC: 103 MG/DL (ref 70–105)
HCT VFR BLD AUTO: 46 % (ref 40–53)
HGB BLD-MCNC: 15.8 G/DL (ref 13.3–17.7)
MCH RBC QN AUTO: 30.6 PG (ref 26.5–33)
MCHC RBC AUTO-ENTMCNC: 34.3 G/DL (ref 31.5–36.5)
MCV RBC AUTO: 89 FL (ref 78–100)
PLATELET # BLD AUTO: 125 10E9/L (ref 150–450)
POTASSIUM SERPL-SCNC: 4.2 MMOL/L (ref 3.5–5.1)
PROT SERPL-MCNC: 6.6 G/DL (ref 6.4–8.9)
RBC # BLD AUTO: 5.17 10E12/L (ref 4.4–5.9)
SODIUM SERPL-SCNC: 139 MMOL/L (ref 134–144)
WBC # BLD AUTO: 5.3 10E9/L (ref 4–11)

## 2020-01-06 PROCEDURE — 80053 COMPREHEN METABOLIC PANEL: CPT | Mod: ZL

## 2020-01-06 PROCEDURE — 36415 COLL VENOUS BLD VENIPUNCTURE: CPT | Mod: ZL

## 2020-01-06 PROCEDURE — 85027 COMPLETE CBC AUTOMATED: CPT | Mod: ZL

## 2020-02-03 DIAGNOSIS — Z94.4 TRANSPLANTED LIVER (H): ICD-10-CM

## 2020-02-03 LAB
ALBUMIN SERPL-MCNC: 4.2 G/DL (ref 3.5–5.7)
ALP SERPL-CCNC: 99 U/L (ref 34–104)
ALT SERPL W P-5'-P-CCNC: 11 U/L (ref 7–52)
ANION GAP SERPL CALCULATED.3IONS-SCNC: 8 MMOL/L (ref 3–14)
AST SERPL W P-5'-P-CCNC: 12 U/L (ref 13–39)
BILIRUB SERPL-MCNC: 1.3 MG/DL (ref 0.3–1)
BUN SERPL-MCNC: 15 MG/DL (ref 7–25)
CALCIUM SERPL-MCNC: 9.1 MG/DL (ref 8.6–10.3)
CHLORIDE SERPL-SCNC: 106 MMOL/L (ref 98–107)
CO2 SERPL-SCNC: 26 MMOL/L (ref 21–31)
CREAT SERPL-MCNC: 0.92 MG/DL (ref 0.7–1.3)
ERYTHROCYTE [DISTWIDTH] IN BLOOD BY AUTOMATED COUNT: 13.6 % (ref 10–15)
GFR SERPL CREATININE-BSD FRML MDRD: 83 ML/MIN/{1.73_M2}
GLUCOSE SERPL-MCNC: 98 MG/DL (ref 70–105)
HCT VFR BLD AUTO: 45.5 % (ref 40–53)
HGB BLD-MCNC: 16 G/DL (ref 13.3–17.7)
MCH RBC QN AUTO: 30.8 PG (ref 26.5–33)
MCHC RBC AUTO-ENTMCNC: 35.2 G/DL (ref 31.5–36.5)
MCV RBC AUTO: 88 FL (ref 78–100)
PLATELET # BLD AUTO: 136 10E9/L (ref 150–450)
POTASSIUM SERPL-SCNC: 4.1 MMOL/L (ref 3.5–5.1)
PROT SERPL-MCNC: 6.4 G/DL (ref 6.4–8.9)
RBC # BLD AUTO: 5.2 10E12/L (ref 4.4–5.9)
SODIUM SERPL-SCNC: 140 MMOL/L (ref 134–144)
WBC # BLD AUTO: 6.3 10E9/L (ref 4–11)

## 2020-02-03 PROCEDURE — 85027 COMPLETE CBC AUTOMATED: CPT | Mod: ZL

## 2020-02-03 PROCEDURE — 36415 COLL VENOUS BLD VENIPUNCTURE: CPT | Mod: ZL

## 2020-02-03 PROCEDURE — 80053 COMPREHEN METABOLIC PANEL: CPT | Mod: ZL

## 2020-02-26 DIAGNOSIS — G25.81 RESTLESS LEG SYNDROME: ICD-10-CM

## 2020-02-26 RX ORDER — PRAMIPEXOLE DIHYDROCHLORIDE 0.25 MG/1
0.5 TABLET ORAL AT BEDTIME
Qty: 180 TABLET | Refills: 3 | Status: SHIPPED | OUTPATIENT
Start: 2020-02-26 | End: 2021-02-12

## 2020-02-26 NOTE — TELEPHONE ENCOUNTER
"Received refill request from [facility] pharmacy for   Pramipexole 0.25 mg take 2 tablet by mouth every night at bedtime.  Per system  \"  pramipexole (MIRAPEX) 0.25 MG tablet 180 tablet 3 8/29/2019  No   Sig - Route: Take 2 tablets (0.5 mg) by mouth At Bedtime - Or     Called and spoke with [facility] and they found Rx from 8/29/19 but Rx was then transferred to [facility] on 11/29/19    Routing refill request to provider for review/approval because:  Patient needs to be seen because it has been more than 1 year since last office visit.    LOV: 7/16/18    Patient is due for annual review   Letter sent.    Ondina Billy RN on 2/26/2020 at 9:55 AM          "

## 2020-02-26 NOTE — LETTER
February 26, 2020      Philipp Jefferson     [facility] [id number]        Dear Philipp,     A refill request was received from your pharmacy for Pramipexole.    Additional refills require an office visit with Dr. Vinson for annual review.    Please call [phone] to schedule appointment.        Sincerely,      Refill Nurse

## 2020-05-04 DIAGNOSIS — Z94.4 TRANSPLANTED LIVER (H): ICD-10-CM

## 2020-05-04 LAB
ALBUMIN SERPL-MCNC: 4.1 G/DL (ref 3.5–5.7)
ALP SERPL-CCNC: 103 U/L (ref 34–104)
ALT SERPL W P-5'-P-CCNC: 11 U/L (ref 7–52)
ANION GAP SERPL CALCULATED.3IONS-SCNC: 8 MMOL/L (ref 3–14)
AST SERPL W P-5'-P-CCNC: 15 U/L (ref 13–39)
BILIRUB SERPL-MCNC: 1.7 MG/DL (ref 0.3–1)
BUN SERPL-MCNC: 11 MG/DL (ref 7–25)
CALCIUM SERPL-MCNC: 9.2 MG/DL (ref 8.6–10.3)
CHLORIDE SERPL-SCNC: 106 MMOL/L (ref 98–107)
CO2 SERPL-SCNC: 28 MMOL/L (ref 21–31)
CREAT SERPL-MCNC: 0.97 MG/DL (ref 0.7–1.3)
ERYTHROCYTE [DISTWIDTH] IN BLOOD BY AUTOMATED COUNT: 13.5 % (ref 10–15)
GFR SERPL CREATININE-BSD FRML MDRD: 78 ML/MIN/{1.73_M2}
GLUCOSE SERPL-MCNC: 120 MG/DL (ref 70–105)
HCT VFR BLD AUTO: 44.8 % (ref 40–53)
HGB BLD-MCNC: 15.4 G/DL (ref 13.3–17.7)
MCH RBC QN AUTO: 30.9 PG (ref 26.5–33)
MCHC RBC AUTO-ENTMCNC: 34.4 G/DL (ref 31.5–36.5)
MCV RBC AUTO: 90 FL (ref 78–100)
PLATELET # BLD AUTO: 131 10E9/L (ref 150–450)
POTASSIUM SERPL-SCNC: 3.5 MMOL/L (ref 3.5–5.1)
PROT SERPL-MCNC: 6.5 G/DL (ref 6.4–8.9)
RBC # BLD AUTO: 4.98 10E12/L (ref 4.4–5.9)
SODIUM SERPL-SCNC: 142 MMOL/L (ref 134–144)
WBC # BLD AUTO: 5 10E9/L (ref 4–11)

## 2020-05-04 PROCEDURE — 80053 COMPREHEN METABOLIC PANEL: CPT | Mod: ZL

## 2020-05-04 PROCEDURE — 36415 COLL VENOUS BLD VENIPUNCTURE: CPT | Mod: ZL

## 2020-05-04 PROCEDURE — 85027 COMPLETE CBC AUTOMATED: CPT | Mod: ZL

## 2020-07-27 ENCOUNTER — OFFICE VISIT (OUTPATIENT)
Dept: INTERNAL MEDICINE | Facility: OTHER | Age: 65
End: 2020-07-27
Attending: NURSE PRACTITIONER
Payer: COMMERCIAL

## 2020-07-27 VITALS
WEIGHT: 268 LBS | TEMPERATURE: 97.9 F | SYSTOLIC BLOOD PRESSURE: 122 MMHG | DIASTOLIC BLOOD PRESSURE: 76 MMHG | HEART RATE: 84 BPM | RESPIRATION RATE: 18 BRPM | BODY MASS INDEX: 40.75 KG/M2

## 2020-07-27 DIAGNOSIS — S05.91XA RIGHT EYE INJURY, INITIAL ENCOUNTER: ICD-10-CM

## 2020-07-27 DIAGNOSIS — Z71.6 ENCOUNTER FOR SMOKING CESSATION COUNSELING: Primary | ICD-10-CM

## 2020-07-27 PROCEDURE — 99213 OFFICE O/P EST LOW 20 MIN: CPT | Performed by: NURSE PRACTITIONER

## 2020-07-27 PROCEDURE — G0463 HOSPITAL OUTPT CLINIC VISIT: HCPCS

## 2020-07-27 RX ORDER — FUROSEMIDE 40 MG
40 TABLET ORAL
COMMUNITY
Start: 2020-02-13 | End: 2021-02-12

## 2020-07-27 RX ORDER — CYCLOSPORINE 25 MG/1
75 CAPSULE, LIQUID FILLED ORAL
COMMUNITY
Start: 2019-08-12 | End: 2022-03-02

## 2020-07-27 RX ORDER — NICOTINE 21 MG/24HR
1 PATCH, TRANSDERMAL 24 HOURS TRANSDERMAL EVERY 24 HOURS
Qty: 28 PATCH | Refills: 3 | Status: SHIPPED | OUTPATIENT
Start: 2020-07-27 | End: 2021-02-12

## 2020-07-27 RX ORDER — POLYMYXIN B SULFATE AND TRIMETHOPRIM 1; 10000 MG/ML; [USP'U]/ML
1-2 SOLUTION OPHTHALMIC EVERY 6 HOURS
Qty: 1 BOTTLE | Refills: 1 | Status: SHIPPED | OUTPATIENT
Start: 2020-07-27 | End: 2021-02-12

## 2020-07-27 ASSESSMENT — ENCOUNTER SYMPTOMS
PHOTOPHOBIA: 1
EYE REDNESS: 1
EYE DISCHARGE: 1
EYE PAIN: 1
EYE ITCHING: 0

## 2020-07-27 ASSESSMENT — VISUAL ACUITY: OU: 1

## 2020-07-27 ASSESSMENT — PAIN SCALES - GENERAL: PAINLEVEL: SEVERE PAIN (7)

## 2020-07-27 NOTE — PROGRESS NOTES
"Nursing Notes:   Sydnee Brooke LPN  7/27/2020 12:01 PM  Signed  Chief Complaint   Patient presents with     Eye Problem     Pt present to clinic today for right eye pain he has had for a week.  Initial /76 (BP Location: Right arm, Patient Position: Sitting, Cuff Size: Adult Large)   Pulse 84   Temp 97.9  F (36.6  C) (Tympanic)   Resp 18   Wt 121.6 kg (268 lb)   BMI 40.75 kg/m   Estimated body mass index is 40.75 kg/m  as calculated from the following:    Height as of 7/21/18: 1.727 m (5' 8\").    Weight as of this encounter: 121.6 kg (268 lb).  Medication Reconciliation: complete    Sydnee Brooke LPN     Nursing note reviewed with patient.  Accuracy and completeness verified.    SUBJECTIVE:                                                      Philipp Jefferson is a 64 year old year old male patient who presents to the clinic today for right eye pain.  He reports he has had this for about 1 week, tinkers in his garage, does body work on cars.  He reports the pain is not going away, sensation that there is something in his eye, he does report increased photophobia with pain and excessive tearing.  He reports typically when this happens he just gets drops to put in his eyes and things have gotten better.  He is also having urges to smoke again and since he had a liver transplant he does not want to do this.  He would like something to help him with these cravings.    Problem List/PMH: Reviewed in EMR, and made relevant updates today.  Medications: Reviewed in EMR, and made relevant updates today.  Allergies: Reviewed in EMR, and made relevant updates today.    Current Code Status:  No Order    REVIEW OF SYSTEMS:    Review of Systems   Eyes: Positive for photophobia, pain, discharge (Watery) and redness. Negative for itching and visual disturbance.   All other systems reviewed and are negative.      OBJECTIVE:                                                      EXAM:     /76 (BP Location: Right " arm, Patient Position: Sitting, Cuff Size: Adult Large)   Pulse 84   Temp 97.9  F (36.6  C) (Tympanic)   Resp 18   Wt 121.6 kg (268 lb)   BMI 40.75 kg/m      Current Pain Score: Severe Pain (7)     Physical Exam  Vitals signs and nursing note reviewed.   Constitutional:       Appearance: Normal appearance. He is obese.   HENT:      Head: Normocephalic and atraumatic.      Nose: Nose normal.      Mouth/Throat:      Mouth: Mucous membranes are moist.      Pharynx: Oropharynx is clear.   Eyes:      General: Lids are normal. Lids are everted, no foreign bodies appreciated. Vision grossly intact. Gaze aligned appropriately.         Right eye: Discharge (teary) present. No foreign body.      Extraocular Movements: Extraocular movements intact.      Conjunctiva/sclera:      Right eye: Right conjunctiva is injected.     Cardiovascular:      Rate and Rhythm: Normal rate and regular rhythm.      Heart sounds: Normal heart sounds.   Pulmonary:      Effort: Pulmonary effort is normal.      Breath sounds: Normal breath sounds.   Musculoskeletal: Normal range of motion.   Skin:     General: Skin is warm and dry.   Neurological:      Mental Status: He is alert and oriented to person, place, and time. Mental status is at baseline.   Psychiatric:         Behavior: Behavior normal.         Thought Content: Thought content normal.         Judgment: Judgment normal.          Diagnostics Completed at this Visit:  No results found for any visits on 07/27/20.     ASSESSMENT/PLAN:                                                      1. Encounter for smoking cessation counseling  - nicotine (NICODERM CQ) 21 MG/24HR 24 hr patch; Place 1 patch onto the skin every 24 hours  Dispense: 28 patch; Refill: 3    2. Right eye injury, initial encounter  Advised he is to use the drops for 5 days, if not improved in 5 days he is to let us know and we will send a referral to [facility] [facility].  - trimethoprim-polymyxin b (POLYTRIM) 60343-1.1  UNIT/ML-% ophthalmic solution; Place 1-2 drops into the right eye every 6 hours  Dispense: 1 Bottle; Refill: 1      Patient verbalizes understanding and is agreeable to plan of care.    Return if symptoms worsen or fail to improve.       RUPERTO Gonzalez, AGNP-C  Internal Medicine  [facility]    07/27/2020 12:33 PM    Portions of this note were dictated using speech recognition software. The note has been proofread but errors in the text may have been overlooked. Please contact me if there are any concerns regarding the accuracy of the dictation.

## 2020-07-27 NOTE — NURSING NOTE
"Chief Complaint   Patient presents with     Eye Problem     Pt present to clinic today for right eye pain he has had for a week.  Initial /76 (BP Location: Right arm, Patient Position: Sitting, Cuff Size: Adult Large)   Pulse 84   Temp 97.9  F (36.6  C) (Tympanic)   Resp 18   Wt 121.6 kg (268 lb)   BMI 40.75 kg/m   Estimated body mass index is 40.75 kg/m  as calculated from the following:    Height as of 7/21/18: 1.727 m (5' 8\").    Weight as of this encounter: 121.6 kg (268 lb).  Medication Reconciliation: complete    Sydnee Brooke LPN  "

## 2020-08-03 DIAGNOSIS — Z94.4 TRANSPLANTED LIVER (H): ICD-10-CM

## 2020-08-03 LAB
ALBUMIN SERPL-MCNC: 4.1 G/DL (ref 3.5–5.7)
ALP SERPL-CCNC: 90 U/L (ref 34–104)
ALT SERPL W P-5'-P-CCNC: 11 U/L (ref 7–52)
ANION GAP SERPL CALCULATED.3IONS-SCNC: 9 MMOL/L (ref 3–14)
AST SERPL W P-5'-P-CCNC: 14 U/L (ref 13–39)
BILIRUB SERPL-MCNC: 1.6 MG/DL (ref 0.3–1)
BUN SERPL-MCNC: 11 MG/DL (ref 7–25)
CALCIUM SERPL-MCNC: 8.6 MG/DL (ref 8.6–10.3)
CHLORIDE SERPL-SCNC: 107 MMOL/L (ref 98–107)
CO2 SERPL-SCNC: 25 MMOL/L (ref 21–31)
CREAT SERPL-MCNC: 0.92 MG/DL (ref 0.7–1.3)
ERYTHROCYTE [DISTWIDTH] IN BLOOD BY AUTOMATED COUNT: 13.3 % (ref 10–15)
GFR SERPL CREATININE-BSD FRML MDRD: 83 ML/MIN/{1.73_M2}
GLUCOSE SERPL-MCNC: 110 MG/DL (ref 70–105)
HCT VFR BLD AUTO: 44.5 % (ref 40–53)
HGB BLD-MCNC: 15.4 G/DL (ref 13.3–17.7)
MCH RBC QN AUTO: 30.5 PG (ref 26.5–33)
MCHC RBC AUTO-ENTMCNC: 34.6 G/DL (ref 31.5–36.5)
MCV RBC AUTO: 88 FL (ref 78–100)
PLATELET # BLD AUTO: 124 10E9/L (ref 150–450)
POTASSIUM SERPL-SCNC: 3.4 MMOL/L (ref 3.5–5.1)
PROT SERPL-MCNC: 6.2 G/DL (ref 6.4–8.9)
RBC # BLD AUTO: 5.05 10E12/L (ref 4.4–5.9)
SODIUM SERPL-SCNC: 141 MMOL/L (ref 134–144)
WBC # BLD AUTO: 5 10E9/L (ref 4–11)

## 2020-08-03 PROCEDURE — 85027 COMPLETE CBC AUTOMATED: CPT | Mod: ZL

## 2020-08-03 PROCEDURE — 80053 COMPREHEN METABOLIC PANEL: CPT | Mod: ZL

## 2020-08-03 PROCEDURE — 36415 COLL VENOUS BLD VENIPUNCTURE: CPT | Mod: ZL

## 2020-11-24 ENCOUNTER — MEDICAL CORRESPONDENCE (OUTPATIENT)
Dept: HEALTH INFORMATION MANAGEMENT | Facility: OTHER | Age: 65
End: 2020-11-24

## 2020-11-24 DIAGNOSIS — Z79.899 NEED FOR PROPHYLACTIC CHEMOTHERAPY: ICD-10-CM

## 2020-11-24 DIAGNOSIS — Z94.4 LIVER REPLACED BY TRANSPLANT (H): Primary | ICD-10-CM

## 2020-11-24 LAB
ALBUMIN SERPL-MCNC: 4 G/DL (ref 3.5–5.7)
ALP SERPL-CCNC: 76 U/L (ref 34–104)
ALT SERPL W P-5'-P-CCNC: 10 U/L (ref 7–52)
ANION GAP SERPL CALCULATED.3IONS-SCNC: 9 MMOL/L (ref 3–14)
AST SERPL W P-5'-P-CCNC: 11 U/L (ref 13–39)
BASOPHILS # BLD AUTO: 0 10E9/L (ref 0–0.2)
BASOPHILS NFR BLD AUTO: 0.6 %
BILIRUB SERPL-MCNC: 1.1 MG/DL (ref 0.3–1)
BUN SERPL-MCNC: 18 MG/DL (ref 7–25)
CALCIUM SERPL-MCNC: 9.1 MG/DL (ref 8.6–10.3)
CHLORIDE SERPL-SCNC: 105 MMOL/L (ref 98–107)
CO2 SERPL-SCNC: 25 MMOL/L (ref 21–31)
CREAT SERPL-MCNC: 1.02 MG/DL (ref 0.7–1.3)
DIFFERENTIAL METHOD BLD: ABNORMAL
EOSINOPHIL # BLD AUTO: 0.2 10E9/L (ref 0–0.7)
EOSINOPHIL NFR BLD AUTO: 3.8 %
ERYTHROCYTE [DISTWIDTH] IN BLOOD BY AUTOMATED COUNT: 13.9 % (ref 10–15)
GFR SERPL CREATININE-BSD FRML MDRD: 74 ML/MIN/{1.73_M2}
GLUCOSE SERPL-MCNC: 164 MG/DL (ref 70–105)
HCT VFR BLD AUTO: 45.4 % (ref 40–53)
HGB BLD-MCNC: 15.6 G/DL (ref 13.3–17.7)
IMM GRANULOCYTES # BLD: 0 10E9/L (ref 0–0.4)
IMM GRANULOCYTES NFR BLD: 0.2 %
LYMPHOCYTES # BLD AUTO: 1.2 10E9/L (ref 0.8–5.3)
LYMPHOCYTES NFR BLD AUTO: 24.9 %
MCH RBC QN AUTO: 30.8 PG (ref 26.5–33)
MCHC RBC AUTO-ENTMCNC: 34.4 G/DL (ref 31.5–36.5)
MCV RBC AUTO: 90 FL (ref 78–100)
MONOCYTES # BLD AUTO: 0.4 10E9/L (ref 0–1.3)
MONOCYTES NFR BLD AUTO: 7.3 %
NEUTROPHILS # BLD AUTO: 3.1 10E9/L (ref 1.6–8.3)
NEUTROPHILS NFR BLD AUTO: 63.2 %
PLATELET # BLD AUTO: 141 10E9/L (ref 150–450)
POTASSIUM SERPL-SCNC: 3.7 MMOL/L (ref 3.5–5.1)
PROT SERPL-MCNC: 6.6 G/DL (ref 6.4–8.9)
RBC # BLD AUTO: 5.07 10E12/L (ref 4.4–5.9)
SODIUM SERPL-SCNC: 139 MMOL/L (ref 134–144)
WBC # BLD AUTO: 4.9 10E9/L (ref 4–11)

## 2020-11-24 PROCEDURE — 36415 COLL VENOUS BLD VENIPUNCTURE: CPT | Mod: ZL

## 2020-11-24 PROCEDURE — 80053 COMPREHEN METABOLIC PANEL: CPT | Mod: ZL

## 2020-11-24 PROCEDURE — 85025 COMPLETE CBC W/AUTO DIFF WBC: CPT | Mod: ZL

## 2021-02-01 DIAGNOSIS — Z94.4 LIVER REPLACED BY TRANSPLANT (H): ICD-10-CM

## 2021-02-01 DIAGNOSIS — Z79.899 NEED FOR PROPHYLACTIC CHEMOTHERAPY: ICD-10-CM

## 2021-02-01 LAB
ALBUMIN SERPL-MCNC: 4.3 G/DL (ref 3.5–5.7)
ALP SERPL-CCNC: 95 U/L (ref 34–104)
ALT SERPL W P-5'-P-CCNC: 10 U/L (ref 7–52)
ANION GAP SERPL CALCULATED.3IONS-SCNC: 10 MMOL/L (ref 3–14)
AST SERPL W P-5'-P-CCNC: 14 U/L (ref 13–39)
BASOPHILS # BLD AUTO: 0 10E9/L (ref 0–0.2)
BASOPHILS NFR BLD AUTO: 0.6 %
BILIRUB SERPL-MCNC: 1.2 MG/DL (ref 0.3–1)
BUN SERPL-MCNC: 18 MG/DL (ref 7–25)
CALCIUM SERPL-MCNC: 9.1 MG/DL (ref 8.6–10.3)
CHLORIDE SERPL-SCNC: 104 MMOL/L (ref 98–107)
CO2 SERPL-SCNC: 24 MMOL/L (ref 21–31)
CREAT SERPL-MCNC: 0.97 MG/DL (ref 0.7–1.3)
DIFFERENTIAL METHOD BLD: ABNORMAL
EOSINOPHIL # BLD AUTO: 0.2 10E9/L (ref 0–0.7)
EOSINOPHIL NFR BLD AUTO: 3.6 %
ERYTHROCYTE [DISTWIDTH] IN BLOOD BY AUTOMATED COUNT: 13.4 % (ref 10–15)
GFR SERPL CREATININE-BSD FRML MDRD: 78 ML/MIN/{1.73_M2}
GLUCOSE SERPL-MCNC: 158 MG/DL (ref 70–105)
HCT VFR BLD AUTO: 45.2 % (ref 40–53)
HGB BLD-MCNC: 15.8 G/DL (ref 13.3–17.7)
IMM GRANULOCYTES # BLD: 0 10E9/L (ref 0–0.4)
IMM GRANULOCYTES NFR BLD: 0.3 %
LYMPHOCYTES # BLD AUTO: 1.4 10E9/L (ref 0.8–5.3)
LYMPHOCYTES NFR BLD AUTO: 20.6 %
MCH RBC QN AUTO: 30.9 PG (ref 26.5–33)
MCHC RBC AUTO-ENTMCNC: 35 G/DL (ref 31.5–36.5)
MCV RBC AUTO: 88 FL (ref 78–100)
MONOCYTES # BLD AUTO: 0.5 10E9/L (ref 0–1.3)
MONOCYTES NFR BLD AUTO: 7.1 %
NEUTROPHILS # BLD AUTO: 4.5 10E9/L (ref 1.6–8.3)
NEUTROPHILS NFR BLD AUTO: 67.8 %
PLATELET # BLD AUTO: 143 10E9/L (ref 150–450)
POTASSIUM SERPL-SCNC: 4 MMOL/L (ref 3.5–5.1)
PROT SERPL-MCNC: 6.6 G/DL (ref 6.4–8.9)
RBC # BLD AUTO: 5.12 10E12/L (ref 4.4–5.9)
SODIUM SERPL-SCNC: 138 MMOL/L (ref 134–144)
WBC # BLD AUTO: 6.7 10E9/L (ref 4–11)

## 2021-02-01 PROCEDURE — 80053 COMPREHEN METABOLIC PANEL: CPT | Mod: ZL

## 2021-02-01 PROCEDURE — 36415 COLL VENOUS BLD VENIPUNCTURE: CPT | Mod: ZL

## 2021-02-01 PROCEDURE — 85025 COMPLETE CBC W/AUTO DIFF WBC: CPT | Mod: ZL

## 2021-02-11 ENCOUNTER — TELEPHONE (OUTPATIENT)
Dept: INTERNAL MEDICINE | Facility: OTHER | Age: 66
End: 2021-02-11

## 2021-02-11 NOTE — TELEPHONE ENCOUNTER
Pt called in, he is running out of medication, Mirapex, he will be out before we can get him in for an appt, can you refill or squeeze him in sooner?  Please call, thank you!    Janina Duncan on 2/11/2021 at 12:51 PM

## 2021-02-12 ENCOUNTER — OFFICE VISIT (OUTPATIENT)
Dept: INTERNAL MEDICINE | Facility: OTHER | Age: 66
End: 2021-02-12
Attending: INTERNAL MEDICINE
Payer: COMMERCIAL

## 2021-02-12 VITALS
WEIGHT: 278.8 LBS | OXYGEN SATURATION: 98 % | HEART RATE: 88 BPM | SYSTOLIC BLOOD PRESSURE: 132 MMHG | BODY MASS INDEX: 42.25 KG/M2 | HEIGHT: 68 IN | TEMPERATURE: 97.3 F | DIASTOLIC BLOOD PRESSURE: 70 MMHG | RESPIRATION RATE: 16 BRPM

## 2021-02-12 DIAGNOSIS — Z00.00 ENCOUNTER FOR MEDICARE ANNUAL WELLNESS EXAM: ICD-10-CM

## 2021-02-12 DIAGNOSIS — E66.01 MORBID OBESITY (H): ICD-10-CM

## 2021-02-12 DIAGNOSIS — E78.2 MIXED HYPERLIPIDEMIA: ICD-10-CM

## 2021-02-12 DIAGNOSIS — R73.9 ELEVATED RANDOM BLOOD GLUCOSE LEVEL: ICD-10-CM

## 2021-02-12 DIAGNOSIS — R60.0 BILATERAL LOWER EXTREMITY EDEMA: ICD-10-CM

## 2021-02-12 DIAGNOSIS — D69.6 THROMBOCYTOPENIA (H): ICD-10-CM

## 2021-02-12 DIAGNOSIS — I10 BENIGN ESSENTIAL HYPERTENSION: ICD-10-CM

## 2021-02-12 DIAGNOSIS — Z94.4 STATUS POST LIVER TRANSPLANTATION (H): ICD-10-CM

## 2021-02-12 DIAGNOSIS — R14.0 NON-GASEOUS ABDOMINAL DISTENTION: Primary | ICD-10-CM

## 2021-02-12 DIAGNOSIS — G25.81 RESTLESS LEG SYNDROME: ICD-10-CM

## 2021-02-12 PROBLEM — F17.200 NICOTINE ADDICTION: Status: RESOLVED | Noted: 2018-02-07 | Resolved: 2021-02-12

## 2021-02-12 PROBLEM — K76.9 LIVER LESION: Status: RESOLVED | Noted: 2017-10-24 | Resolved: 2021-02-12

## 2021-02-12 PROCEDURE — G0463 HOSPITAL OUTPT CLINIC VISIT: HCPCS

## 2021-02-12 PROCEDURE — G0438 PPPS, INITIAL VISIT: HCPCS | Performed by: INTERNAL MEDICINE

## 2021-02-12 PROCEDURE — 99214 OFFICE O/P EST MOD 30 MIN: CPT | Mod: 25 | Performed by: INTERNAL MEDICINE

## 2021-02-12 RX ORDER — FUROSEMIDE 40 MG
40 TABLET ORAL 2 TIMES DAILY
COMMUNITY
Start: 2021-02-12 | End: 2022-03-02

## 2021-02-12 RX ORDER — LISINOPRIL 10 MG/1
10 TABLET ORAL DAILY
COMMUNITY
Start: 2021-01-11 | End: 2022-03-02

## 2021-02-12 RX ORDER — PRAMIPEXOLE DIHYDROCHLORIDE 0.25 MG/1
0.5 TABLET ORAL AT BEDTIME
Qty: 180 TABLET | Refills: 3 | Status: SHIPPED | OUTPATIENT
Start: 2021-02-12 | End: 2021-08-23

## 2021-02-12 RX ORDER — PRAMIPEXOLE DIHYDROCHLORIDE 0.25 MG/1
0.5 TABLET ORAL AT BEDTIME
Qty: 14 TABLET | Refills: 0 | Status: SHIPPED | OUTPATIENT
Start: 2021-02-12 | End: 2021-08-23

## 2021-02-12 ASSESSMENT — MIFFLIN-ST. JEOR: SCORE: 2035.88

## 2021-02-12 ASSESSMENT — PAIN SCALES - GENERAL: PAINLEVEL: NO PAIN (0)

## 2021-02-12 NOTE — TELEPHONE ENCOUNTER
Patient was contacted.  Today works for him and he has been added to the schedule.  Cuca George on 2/12/2021 at 12:09 PM

## 2021-02-12 NOTE — TELEPHONE ENCOUNTER
We can schedule him for his Medicare wellness visit today.   -- please see if 4:20 pm works for him.     Ramon Vinson MD

## 2021-02-12 NOTE — PROGRESS NOTES
"SUBJECTIVE:   Philipp Jefferson is a 65 year old male who presents for Preventive Visit.  Patient has been advised of split billing requirements and indicates understanding: Yes  Are you in the first 12 months of your Medicare Part B coverage?  No    Physical Health:    In general, how would you rate your overall physical health? fair    Outside of work, how many days during the week do you exercise? 2-3 days/week    Outside of work, approximately how many minutes a day do you exercise?15-30 minutes    If you drink alcohol do you typically have >3 drinks per day or >7 drinks per week? Not Applicable    Do you usually eat at least 4 servings of fruit and vegetables a day, include whole grains & fiber and avoid regularly eating high fat or \"junk\" foods? Yes    Do you have any problems taking medications regularly?  No    Do you have any side effects from medications? weight gain    Needs assistance for the following daily activities: no assistance needed    Which of the following safety concerns are present in your home?  none identified     Hearing impairment: No    In the past 6 months, have you been bothered by leaking of urine? no    Mental Health:    In general, how would you rate your overall mental or emotional health? excellent  PHQ-2 Score: 0    Do you feel safe in your environment? Yes    Have you ever done Advance Care Planning? (For example, a Health Directive, POLST, or a discussion with a medical provider or your loved ones about your wishes): No, advance care planning information given to patient to review.  Patient declined advance care planning discussion at this time.    Additional concerns to address?  No    Fall risk:  Fallen 2 or more times in the past year?: No  Any fall with injury in the past year?: No    Cognitive Screenin) Repeat 3 items (Leader, Season, Table)    2) Clock draw: NORMAL  3) 3 item recall: Recalls 3 objects  Results: 3 items recalled: COGNITIVE IMPAIRMENT LESS " LIKELY    Mini-CogTM Copyright NOAH Norris. Licensed by the author for use in [facility]; reprinted with permission (christy@[facility]). All rights reserved.      Do you have sleep apnea, excessive snoring or daytime drowsiness?: no    Reviewed and updated as needed this visit by clinical staff  Tobacco  Allergies  Meds  Problems  Med Hx  Surg Hx  Fam Hx  Soc Hx          Reviewed and updated as needed this visit by Provider  Tobacco  Allergies  Meds  Problems  Med Hx  Surg Hx  Fam Hx         Social History     Tobacco Use     Smoking status: Former Smoker     Packs/day: 0.50     Types: Cigars, Cigarettes     Smokeless tobacco: Never Used   Substance Use Topics     Alcohol use: No     Alcohol/week: 0.0 standard drinks                         Current providers sharing in care for this patient include:   Patient Care Team:  Ramon Vinson MD as PCP - General (Internal Medicine)  Jenny Jones NP as Assigned PCP    The following health maintenance items are reviewed in Epic and correct as of today:  Health Maintenance   Topic Date Due     ZOSTER IMMUNIZATION (2 of 2) 08/22/2019     MEDICARE ANNUAL WELLNESS VISIT  02/12/2022     DTAP/TDAP/TD IMMUNIZATION (2 - Td) 12/12/2022     COLORECTAL CANCER SCREENING  01/01/2024     ADVANCE CARE PLANNING  02/12/2026     PHQ-2  Completed     Pneumococcal Vaccine: Pediatrics (0 to 5 Years) and At-Risk Patients (6 to 64 Years)  Completed     HEPATITIS B IMMUNIZATION  Completed     HIV SCREENING  Addressed     INFLUENZA VACCINE  Addressed     IPV IMMUNIZATION  Aged Out     MENINGITIS IMMUNIZATION  Aged Out     Review of Systems   Constitutional: Negative for chills and fever.   HENT: Negative for congestion and hearing loss.    Eyes: Negative for visual disturbance.   Respiratory: Positive for shortness of breath. Negative for cough and wheezing.    Cardiovascular: Positive for leg swelling. Negative for chest pain and palpitations.   Gastrointestinal: Positive  "for abdominal distention. Negative for abdominal pain, diarrhea, nausea and vomiting.   Endocrine: Negative for cold intolerance and heat intolerance.   Genitourinary: Negative for dysuria and hematuria.   Musculoskeletal: Negative for arthralgias and myalgias.   Skin: Negative for rash and wound.   Allergic/Immunologic: Positive for immunocompromised state.   Neurological: Negative for dizziness and light-headedness.   Hematological: Does not bruise/bleed easily.   Psychiatric/Behavioral: Negative for agitation and confusion.        OBJECTIVE:   /70 (BP Location: Right arm, Patient Position: Sitting, Cuff Size: Adult Large)   Pulse 88   Temp 97.3  F (36.3  C) (Tympanic)   Resp 16   Ht 1.73 m (5' 8.11\")   Wt 126.5 kg (278 lb 12.8 oz)   SpO2 98%   BMI 42.25 kg/m   Estimated body mass index is 42.25 kg/m  as calculated from the following:    Height as of this encounter: 1.73 m (5' 8.11\").    Weight as of this encounter: 126.5 kg (278 lb 12.8 oz).    Physical Exam  Constitutional:       General: He is not in acute distress.     Appearance: He is well-developed. He is obese. He is not diaphoretic.   HENT:      Head: Normocephalic and atraumatic.   Eyes:      General: No scleral icterus.     Conjunctiva/sclera: Conjunctivae normal.   Neck:      Musculoskeletal: Neck supple.      Vascular: No carotid bruit.   Cardiovascular:      Rate and Rhythm: Normal rate and regular rhythm.      Pulses: Normal pulses.   Pulmonary:      Effort: Pulmonary effort is normal.      Breath sounds: Normal breath sounds.   Abdominal:      General: There is distension.      Palpations: Abdomen is soft.      Tenderness: There is no abdominal tenderness. There is no right CVA tenderness, left CVA tenderness, guarding or rebound.   Musculoskeletal:         General: No deformity.      Right lower le+ Pitting Edema present.      Left lower le+ Pitting Edema present.   Lymphadenopathy:      Cervical: No cervical adenopathy. "   Skin:     General: Skin is warm and dry.      Findings: No rash.   Neurological:      Mental Status: He is alert and oriented to person, place, and time. Mental status is at baseline.   Psychiatric:         Mood and Affect: Mood normal.         Behavior: Behavior normal.        Diagnostic Test Results:    Orders Only on 02/01/2021   Component Date Value Ref Range Status     Sodium 02/01/2021 138  134 - 144 mmol/L Final     Potassium 02/01/2021 4.0  3.5 - 5.1 mmol/L Final     Chloride 02/01/2021 104  98 - 107 mmol/L Final     Carbon Dioxide 02/01/2021 24  21 - 31 mmol/L Final     Anion Gap 02/01/2021 10  3 - 14 mmol/L Final     Glucose 02/01/2021 158* 70 - 105 mg/dL Final     Urea Nitrogen 02/01/2021 18  7 - 25 mg/dL Final     Creatinine 02/01/2021 0.97  0.70 - 1.30 mg/dL Final     GFR Estimate 02/01/2021 78  >60 mL/min/[1.73_m2] Final     GFR Estimate If Black 02/01/2021 >90  >60 mL/min/[1.73_m2] Final     Calcium 02/01/2021 9.1  8.6 - 10.3 mg/dL Final     Bilirubin Total 02/01/2021 1.2* 0.3 - 1.0 mg/dL Final     Albumin 02/01/2021 4.3  3.5 - 5.7 g/dL Final     Protein Total 02/01/2021 6.6  6.4 - 8.9 g/dL Final     Alkaline Phosphatase 02/01/2021 95  34 - 104 U/L Final     ALT 02/01/2021 10  7 - 52 U/L Final     AST 02/01/2021 14  13 - 39 U/L Final     WBC 02/01/2021 6.7  4.0 - 11.0 10e9/L Final     RBC Count 02/01/2021 5.12  4.4 - 5.9 10e12/L Final     Hemoglobin 02/01/2021 15.8  13.3 - 17.7 g/dL Final     Hematocrit 02/01/2021 45.2  40.0 - 53.0 % Final     MCV 02/01/2021 88  78 - 100 fl Final     MCH 02/01/2021 30.9  26.5 - 33.0 pg Final     MCHC 02/01/2021 35.0  31.5 - 36.5 g/dL Final     RDW 02/01/2021 13.4  10.0 - 15.0 % Final     Platelet Count 02/01/2021 143* 150 - 450 10e9/L Final     Diff Method 02/01/2021 Automated Method   Final     % Neutrophils 02/01/2021 67.8  % Final     % Lymphocytes 02/01/2021 20.6  % Final     % Monocytes 02/01/2021 7.1  % Final     % Eosinophils 02/01/2021 3.6  % Final     %  Basophils 02/01/2021 0.6  % Final     % Immature Granulocytes 02/01/2021 0.3  % Final     Absolute Neutrophil 02/01/2021 4.5  1.6 - 8.3 10e9/L Final     Absolute Lymphocytes 02/01/2021 1.4  0.8 - 5.3 10e9/L Final     Absolute Monocytes 02/01/2021 0.5  0.0 - 1.3 10e9/L Final     Absolute Eosinophils 02/01/2021 0.2  0.0 - 0.7 10e9/L Final     Absolute Basophils 02/01/2021 0.0  0.0 - 0.2 10e9/L Final     Abs Immature Granulocytes 02/01/2021 0.0  0 - 0.4 10e9/L Final       ASSESSMENT / PLAN:       ICD-10-CM    1. Non-gaseous abdominal distention  R14.0 US Abdomen Limited   2. Status post liver transplantation (H)  Z94.4 US Abdomen Limited   3. Restless leg syndrome  G25.81 pramipexole (MIRAPEX) 0.25 MG tablet     pramipexole (MIRAPEX) 0.25 MG tablet   4. Morbid obesity (H)  E66.01    5. Elevated random blood glucose level  R73.09 Hemoglobin A1c   6. Benign essential hypertension  I10 furosemide (LASIX) 40 MG tablet   7. Thrombocytopenia (H)  D69.6    8. Mixed hyperlipidemia  E78.2 Lipid Panel   9. Bilateral lower extremity edema  R60.0 furosemide (LASIX) 40 MG tablet   10. Encounter for Medicare annual wellness exam  Z00.00    Patient presents for annual Medicare wellness visit as well as follow-up multiple issues.    History of liver transplant.  He is chronically immunosuppressed.  Has been having on gaseous abdominal distention.  He feels as though he is full of ascites again.  Previously taking a number of different diuretics.  Noticed on furosemide 40 mg in the morning.  States that he misses his afternoon dose about half the time.  Supposed to take 4 mg twice daily.  Check liver ultrasound/abdominal ultrasound.  Evaluate for ascites.    Restless leg syndrome, ongoing and chronic.  He uses mail order pharmacy but he will be out of medications on Sunday and likely will not be able to get his mail order shipment for about 1 week.  He would like a 7-day supply sent to the local pharmacy and his normal 3-month supply  "sent to mail order pharmacy.    Obesity, discussed need for reduced oral caloric intake.  Regular exercise.  Reduce carbohydrate intake.  Information printed and reviewed.    Elevated random glucose, noted with the last couple labs that have been checked.  He gets labs checked regularly due to his liver transplant.  Add hemoglobin A1c to his next lab draw.  Orders placed.    Hypertension, currently on furosemide twice daily.  Encouraged regular use of this dose and to try to take both doses every day.    Thrombocytopenia, chronic.  Previously had pancytopenia.  Denies excessive bruising or bleeding issues.  Continue to monitor.    Mixed hyperlipidemia, currently diet controlled.  Check labs.    Bilateral edema, see above.  Encouraged Lasix 40 mg twice daily on a regular basis.  If needed, could consider adding spironolactone.  He is currently taking amlodipine.  No ACE inhibitor.  No ARB.  No potassium supplement.    Health screenings are otherwise up-to-date.  He plans to pursue coronavirus vaccination when it is available.  Encouraged him to check on the cost and coverage of the shingles vaccination.  Declines flu shot.     Patient has been advised of split billing requirements and indicates understanding: Yes    COUNSELING:  Reviewed preventive health counseling, as reflected in patient instructions  Special attention given to:       Regular exercise       Healthy diet/nutrition       Vision screening       Hearing screening       Dental care       Bladder control       Fall risk prevention       Immunizations    Estimated body mass index is 42.25 kg/m  as calculated from the following:    Height as of this encounter: 1.73 m (5' 8.11\").    Weight as of this encounter: 126.5 kg (278 lb 12.8 oz).    Weight management plan: Discussed healthy diet and exercise guidelines    He reports that he has quit smoking. His smoking use included cigars and cigarettes. He smoked 0.50 packs per day. He has never used smokeless " tobacco.    Appropriate preventive services were discussed with this patient, including applicable screening as appropriate for cardiovascular disease, diabetes, osteopenia/osteoporosis, and glaucoma.  As appropriate for age/gender, discussed screening for colorectal cancer, prostate cancer, breast cancer, and cervical cancer. Checklist reviewing preventive services available has been given to the patient.    Reviewed patients plan of care and provided an AVS. The Complex Care Plan (for patients with higher acuity and needing more deliberate coordination of services) for Philipp meets the Care Plan requirement. This Care Plan has been established and reviewed with the Patient.    Counseling Resources:  ATP IV Guidelines  Pooled Cohorts Equation Calculator  Breast Cancer Risk Calculator  BRCA-Related Cancer Risk Assessment: FHS-7 Tool  FRAX Risk Assessment  ICSI Preventive Guidelines  Dietary Guidelines for Americans, 2010  USDA's MyPlate  ASA Prophylaxis  Lung CA Screening    Ramon Vinson MD  [facility]

## 2021-02-12 NOTE — PATIENT INSTRUCTIONS
Immunization History   Administered Date(s) Administered     Flu, Unspecified 11/01/2007     HepA-Adult 11/23/2007, 08/07/2008, 09/10/2008     HepB, Unspecified 12/12/2012, 03/06/2013, 04/09/2013, 05/07/2013     HepB-Adult 12/12/2012, 03/06/2013, 04/09/2013, 05/07/2013     HepB-Dialysis 06/10/2014     Influenza (IIV3) PF 10/15/2008, 12/12/2012, 11/20/2013, 10/15/2014     Influenza Vaccine IM > 6 months Valent IIV4 10/15/2008, 11/20/2013, 10/15/2014, 10/20/2017, 12/10/2018     Influenza Vaccine IM Ages 6-35 Months 4 Valent (PF) 11/01/2007, 10/15/2008, 12/12/2012, 11/20/2013     Influenza,INJ,MDCK,PF,Quad >4yrs 03/10/2020     Pneumo Conj 13-V (2010&after) 06/10/2014     Pneumococcal 23 valent 12/12/2012, 06/27/2019     TDAP Vaccine (Adacel) 12/12/2012     Triamcinolone Acetonide 10/16/2015, 08/11/2016     Twinrix A/B 11/23/2007, 08/07/2008, 09/10/2008     Zoster vaccine recombinant adjuvanted (SHINGRIX) 06/27/2019        -- Get the 2 shot COVID-19 Vaccination series (1st shot when available and 2nd shot 17 to 21 days later) -- (No vaccines for 2 weeks prior to Covid Vaccine)    -- Get the new shingles shot (2 in series) (Shingrix) - from one of the local pharmacies, at your convenience. -- Check cost/coverage.  --- get these done after your COVID vaccines are completed.     Pneumococcal Pneumonia vaccines (PCV 23) -- Repeat in 2024.       Labs with your next scheduled lab draw.     Liver ultrasound  - they will call with date/time of appointment.  + If Ascites, will order abdominal tap.     Medications refilled.     Blood pressure is well controlled.     Take your Lasix -- Leg swelling noted.     Return in approximately 1 year, or sooner as needed for follow-up with Dr. Skaudis.  - Annual Follow-up / Physical - Medicare Annual Wellness Visit     Clinic : [phone]  Appointment line: [phone]   Patient Education   Personalized Prevention Plan  You are due for the preventive services outlined below.  Your  care team is available to assist you in scheduling these services.  If you have already completed any of these items, please share that information with your care team to update in your medical record.  Health Maintenance Due   Topic Date Due     Zoster (Shingles) Vaccine (2 of 2) 08/22/2019     Preventive Health Recommendations  See your health care provider every year to    Review health changes.     Discuss preventive care.      Review your medicines if your doctor has prescribed any.    Talk with your health care provider about whether you should have a test to screen for prostate cancer (PSA).    Every 3 years, have a diabetes test (fasting glucose). If you are at risk for diabetes, you should have this test more often.    Every 5 years, have a cholesterol test. Have this test more often if you are at risk for high cholesterol or heart disease.     Every 10 years, have a colonoscopy. Or, have a yearly FIT test (stool test). These exams will check for colon cancer.    Talk to with your health care provider about screening for Abdominal Aortic Aneurysm if you have a family history of AAA or have a history of smoking.    Shots:     Get a flu shot each year.     Get a tetanus shot every 10 years.     Talk to your doctor about your pneumonia vaccines. There are now two you should receive - Pneumovax (PPSV 23) and Prevnar (PCV 13).    Talk to your pharmacist about a shingles vaccine.     Talk to your doctor about the hepatitis B vaccine.    Nutrition:     Eat at least 5 servings of fruits and vegetables each day.     Eat whole-grain bread, whole-wheat pasta and brown rice instead of white grains and rice.     Get adequate Calcium and Vitamin D.     Lifestyle    Exercise for at least 150 minutes a week (30 minutes a day, 5 days a week). This will help you control your weight and prevent disease.     Limit alcohol to one drink per day.     No smoking.     Wear sunscreen to prevent skin cancer.     See your dentist  every six months for an exam and cleaning.     See your eye doctor every 1 to 2 years to screen for conditions such as glaucoma, macular degeneration and cataracts.    Personalized Prevention Plan  You are due for the preventive services outlined below.  Your care team is available to assist you in scheduling these services.  If you have already completed any of these items, please share that information with your care team to update in your medical record.  Health Maintenance   Topic Date Due     ZOSTER IMMUNIZATION (2 of 2) 08/22/2019     MEDICARE ANNUAL WELLNESS VISIT  02/12/2022     DTAP/TDAP/TD IMMUNIZATION (2 - Td) 12/12/2022     COLORECTAL CANCER SCREENING  01/01/2024     ADVANCE CARE PLANNING  02/12/2026     PHQ-2  Completed     Pneumococcal Vaccine: Pediatrics (0 to 5 Years) and At-Risk Patients (6 to 64 Years)  Completed     HEPATITIS B IMMUNIZATION  Completed     HIV SCREENING  Addressed     INFLUENZA VACCINE  Addressed     IPV IMMUNIZATION  Aged Out     MENINGITIS IMMUNIZATION  Aged Out

## 2021-02-13 ASSESSMENT — ENCOUNTER SYMPTOMS
LIGHT-HEADEDNESS: 0
VOMITING: 0
COUGH: 0
BRUISES/BLEEDS EASILY: 0
CONFUSION: 0
ABDOMINAL PAIN: 0
HEMATURIA: 0
ABDOMINAL DISTENTION: 1
WOUND: 0
DYSURIA: 0
PALPITATIONS: 0
CHILLS: 0
AGITATION: 0
DIARRHEA: 0
ARTHRALGIAS: 0
NAUSEA: 0
FEVER: 0
SHORTNESS OF BREATH: 1
WHEEZING: 0
MYALGIAS: 0
DIZZINESS: 0

## 2021-02-19 ENCOUNTER — HOSPITAL ENCOUNTER (OUTPATIENT)
Dept: ULTRASOUND IMAGING | Facility: OTHER | Age: 66
End: 2021-02-19
Attending: INTERNAL MEDICINE
Payer: MEDICARE

## 2021-02-19 DIAGNOSIS — R73.9 ELEVATED RANDOM BLOOD GLUCOSE LEVEL: ICD-10-CM

## 2021-02-19 DIAGNOSIS — E78.2 MIXED HYPERLIPIDEMIA: ICD-10-CM

## 2021-02-19 DIAGNOSIS — R14.0 NON-GASEOUS ABDOMINAL DISTENTION: ICD-10-CM

## 2021-02-19 DIAGNOSIS — Z94.4 STATUS POST LIVER TRANSPLANTATION (H): ICD-10-CM

## 2021-02-19 LAB
CHOLEST SERPL-MCNC: 170 MG/DL
HBA1C MFR BLD: 5.1 % (ref 4–6)
HDLC SERPL-MCNC: 32 MG/DL (ref 23–92)
LDLC SERPL CALC-MCNC: 108 MG/DL
NONHDLC SERPL-MCNC: 138 MG/DL
TRIGL SERPL-MCNC: 148 MG/DL

## 2021-02-19 PROCEDURE — 80061 LIPID PANEL: CPT | Mod: ZL | Performed by: INTERNAL MEDICINE

## 2021-02-19 PROCEDURE — 83036 HEMOGLOBIN GLYCOSYLATED A1C: CPT | Mod: ZL | Performed by: INTERNAL MEDICINE

## 2021-02-19 PROCEDURE — 76705 ECHO EXAM OF ABDOMEN: CPT

## 2021-02-19 PROCEDURE — 36415 COLL VENOUS BLD VENIPUNCTURE: CPT | Mod: ZL | Performed by: INTERNAL MEDICINE

## 2021-05-04 DIAGNOSIS — Z94.4 LIVER REPLACED BY TRANSPLANT (H): ICD-10-CM

## 2021-05-04 DIAGNOSIS — Z79.899 NEED FOR PROPHYLACTIC CHEMOTHERAPY: ICD-10-CM

## 2021-05-04 LAB
ALBUMIN SERPL-MCNC: 4 G/DL (ref 3.5–5.7)
ALP SERPL-CCNC: 73 U/L (ref 34–104)
ALT SERPL W P-5'-P-CCNC: 12 U/L (ref 7–52)
ANION GAP SERPL CALCULATED.3IONS-SCNC: 8 MMOL/L (ref 3–14)
AST SERPL W P-5'-P-CCNC: 15 U/L (ref 13–39)
BASOPHILS # BLD AUTO: 0 10E9/L (ref 0–0.2)
BASOPHILS NFR BLD AUTO: 0.5 %
BILIRUB SERPL-MCNC: 0.9 MG/DL (ref 0.3–1)
BUN SERPL-MCNC: 17 MG/DL (ref 7–25)
CALCIUM SERPL-MCNC: 9.2 MG/DL (ref 8.6–10.3)
CHLORIDE SERPL-SCNC: 105 MMOL/L (ref 98–107)
CO2 SERPL-SCNC: 27 MMOL/L (ref 21–31)
CREAT SERPL-MCNC: 0.92 MG/DL (ref 0.7–1.3)
DIFFERENTIAL METHOD BLD: NORMAL
EOSINOPHIL # BLD AUTO: 0.3 10E9/L (ref 0–0.7)
EOSINOPHIL NFR BLD AUTO: 4.5 %
ERYTHROCYTE [DISTWIDTH] IN BLOOD BY AUTOMATED COUNT: 13.5 % (ref 10–15)
GFR SERPL CREATININE-BSD FRML MDRD: 83 ML/MIN/{1.73_M2}
GLUCOSE SERPL-MCNC: 109 MG/DL (ref 70–105)
HCT VFR BLD AUTO: 45.9 % (ref 40–53)
HGB BLD-MCNC: 16.3 G/DL (ref 13.3–17.7)
IMM GRANULOCYTES # BLD: 0 10E9/L (ref 0–0.4)
IMM GRANULOCYTES NFR BLD: 0.3 %
LYMPHOCYTES # BLD AUTO: 1.5 10E9/L (ref 0.8–5.3)
LYMPHOCYTES NFR BLD AUTO: 26.5 %
MCH RBC QN AUTO: 31.8 PG (ref 26.5–33)
MCHC RBC AUTO-ENTMCNC: 35.5 G/DL (ref 31.5–36.5)
MCV RBC AUTO: 90 FL (ref 78–100)
MONOCYTES # BLD AUTO: 0.4 10E9/L (ref 0–1.3)
MONOCYTES NFR BLD AUTO: 7.5 %
NEUTROPHILS # BLD AUTO: 3.5 10E9/L (ref 1.6–8.3)
NEUTROPHILS NFR BLD AUTO: 60.7 %
PLATELET # BLD AUTO: 155 10E9/L (ref 150–450)
POTASSIUM SERPL-SCNC: 4.2 MMOL/L (ref 3.5–5.1)
PROT SERPL-MCNC: 6.4 G/DL (ref 6.4–8.9)
RBC # BLD AUTO: 5.12 10E12/L (ref 4.4–5.9)
SODIUM SERPL-SCNC: 140 MMOL/L (ref 134–144)
WBC # BLD AUTO: 5.8 10E9/L (ref 4–11)

## 2021-05-04 PROCEDURE — 36415 COLL VENOUS BLD VENIPUNCTURE: CPT | Mod: ZL

## 2021-05-04 PROCEDURE — 85025 COMPLETE CBC W/AUTO DIFF WBC: CPT | Mod: ZL

## 2021-05-04 PROCEDURE — 80053 COMPREHEN METABOLIC PANEL: CPT | Mod: ZL

## 2021-08-02 ENCOUNTER — LAB (OUTPATIENT)
Dept: LAB | Facility: OTHER | Age: 66
End: 2021-08-02
Payer: MEDICARE

## 2021-08-02 DIAGNOSIS — Z94.4 LIVER REPLACED BY TRANSPLANT (H): ICD-10-CM

## 2021-08-02 DIAGNOSIS — Z79.899 ENCOUNTER FOR LONG-TERM (CURRENT) USE OF MEDICATIONS: ICD-10-CM

## 2021-08-02 LAB
ALBUMIN SERPL-MCNC: 4.3 G/DL (ref 3.5–5.7)
ALP SERPL-CCNC: 79 U/L (ref 34–104)
ALT SERPL W P-5'-P-CCNC: 11 U/L (ref 7–52)
ANION GAP SERPL CALCULATED.3IONS-SCNC: 10 MMOL/L (ref 3–14)
AST SERPL W P-5'-P-CCNC: 13 U/L (ref 13–39)
BASOPHILS # BLD AUTO: 0 10E3/UL (ref 0–0.2)
BASOPHILS NFR BLD AUTO: 1 %
BILIRUB SERPL-MCNC: 1.3 MG/DL (ref 0.3–1)
BUN SERPL-MCNC: 13 MG/DL (ref 7–25)
CALCIUM SERPL-MCNC: 9.2 MG/DL (ref 8.6–10.3)
CHLORIDE BLD-SCNC: 104 MMOL/L (ref 98–107)
CO2 SERPL-SCNC: 27 MMOL/L (ref 21–31)
CREAT SERPL-MCNC: 0.94 MG/DL (ref 0.7–1.3)
EOSINOPHIL # BLD AUTO: 0.2 10E3/UL (ref 0–0.7)
EOSINOPHIL NFR BLD AUTO: 4 %
ERYTHROCYTE [DISTWIDTH] IN BLOOD BY AUTOMATED COUNT: 13.7 % (ref 10–15)
GFR SERPL CREATININE-BSD FRML MDRD: 86 ML/MIN/1.73M2
GLUCOSE BLD-MCNC: 101 MG/DL (ref 70–105)
HCT VFR BLD AUTO: 45.4 % (ref 40–53)
HGB BLD-MCNC: 16.2 G/DL (ref 13.3–17.7)
IMM GRANULOCYTES # BLD: 0 10E3/UL
IMM GRANULOCYTES NFR BLD: 0 %
LYMPHOCYTES # BLD AUTO: 1.4 10E3/UL (ref 0.8–5.3)
LYMPHOCYTES NFR BLD AUTO: 23 %
MCH RBC QN AUTO: 31.6 PG (ref 26.5–33)
MCHC RBC AUTO-ENTMCNC: 35.7 G/DL (ref 31.5–36.5)
MCV RBC AUTO: 89 FL (ref 78–100)
MONOCYTES # BLD AUTO: 0.5 10E3/UL (ref 0–1.3)
MONOCYTES NFR BLD AUTO: 8 %
NEUTROPHILS # BLD AUTO: 3.8 10E3/UL (ref 1.6–8.3)
NEUTROPHILS NFR BLD AUTO: 64 %
NRBC # BLD AUTO: 0 10E3/UL
NRBC BLD AUTO-RTO: 0 /100
PLATELET # BLD AUTO: 152 10E3/UL (ref 150–450)
POTASSIUM BLD-SCNC: 3.7 MMOL/L (ref 3.5–5.1)
PROT SERPL-MCNC: 6.6 G/DL (ref 6.4–8.9)
RBC # BLD AUTO: 5.12 10E6/UL (ref 4.4–5.9)
SODIUM SERPL-SCNC: 141 MMOL/L (ref 134–144)
WBC # BLD AUTO: 5.9 10E3/UL (ref 4–11)

## 2021-08-02 PROCEDURE — 85025 COMPLETE CBC W/AUTO DIFF WBC: CPT | Mod: ZL

## 2021-08-02 PROCEDURE — 82040 ASSAY OF SERUM ALBUMIN: CPT | Mod: ZL

## 2021-08-02 PROCEDURE — 36415 COLL VENOUS BLD VENIPUNCTURE: CPT | Mod: ZL

## 2021-08-23 ENCOUNTER — TELEPHONE (OUTPATIENT)
Dept: INTERNAL MEDICINE | Facility: OTHER | Age: 66
End: 2021-08-23

## 2021-08-23 DIAGNOSIS — G25.81 RESTLESS LEG SYNDROME: ICD-10-CM

## 2021-08-23 RX ORDER — PRAMIPEXOLE DIHYDROCHLORIDE 0.25 MG/1
0.5 TABLET ORAL AT BEDTIME
Qty: 180 TABLET | Refills: 0 | Status: SHIPPED | OUTPATIENT
Start: 2021-08-23 | End: 2022-02-15

## 2021-08-23 NOTE — TELEPHONE ENCOUNTER
DJS-patient Is looking for an emergency fill of a medication he can use [facility] here in town     Please call and advise    Thank You    Reentta Molina on 8/23/2021 at 1:16 PM

## 2021-08-23 NOTE — TELEPHONE ENCOUNTER
Patient usually gets medications from [facility] and has been out for 3 days. Patient is a transplant patient. Patient needs a temporary supply of pramipexole (MIRAPEX) 0.25 MG tablet. Patient is unsure when [facility] will deliver from [facility].  Tsering Trinidad LPN .............8/23/2021  2:59 PM

## 2021-11-01 ENCOUNTER — LAB (OUTPATIENT)
Dept: LAB | Facility: OTHER | Age: 66
End: 2021-11-01
Payer: MEDICARE

## 2021-11-01 DIAGNOSIS — Z94.4 LIVER REPLACED BY TRANSPLANT (H): ICD-10-CM

## 2021-11-01 DIAGNOSIS — Z79.899 ENCOUNTER FOR LONG-TERM (CURRENT) USE OF MEDICATIONS: ICD-10-CM

## 2021-11-01 LAB
ALBUMIN SERPL-MCNC: 4.1 G/DL (ref 3.5–5.7)
ALP SERPL-CCNC: 78 U/L (ref 34–104)
ALT SERPL W P-5'-P-CCNC: 10 U/L (ref 7–52)
ANION GAP SERPL CALCULATED.3IONS-SCNC: 9 MMOL/L (ref 3–14)
AST SERPL W P-5'-P-CCNC: 13 U/L (ref 13–39)
BASOPHILS # BLD AUTO: 0 10E3/UL (ref 0–0.2)
BASOPHILS NFR BLD AUTO: 1 %
BILIRUB SERPL-MCNC: 1 MG/DL (ref 0.3–1)
BUN SERPL-MCNC: 15 MG/DL (ref 7–25)
CALCIUM SERPL-MCNC: 8.7 MG/DL (ref 8.6–10.3)
CHLORIDE BLD-SCNC: 106 MMOL/L (ref 98–107)
CO2 SERPL-SCNC: 25 MMOL/L (ref 21–31)
CREAT SERPL-MCNC: 0.87 MG/DL (ref 0.7–1.3)
EOSINOPHIL # BLD AUTO: 0.1 10E3/UL (ref 0–0.7)
EOSINOPHIL NFR BLD AUTO: 2 %
ERYTHROCYTE [DISTWIDTH] IN BLOOD BY AUTOMATED COUNT: 13.3 % (ref 10–15)
GFR SERPL CREATININE-BSD FRML MDRD: >90 ML/MIN/1.73M2
GLUCOSE BLD-MCNC: 110 MG/DL (ref 70–105)
HCT VFR BLD AUTO: 44.2 % (ref 40–53)
HGB BLD-MCNC: 16.1 G/DL (ref 13.3–17.7)
IMM GRANULOCYTES # BLD: 0 10E3/UL
IMM GRANULOCYTES NFR BLD: 0 %
LYMPHOCYTES # BLD AUTO: 1.5 10E3/UL (ref 0.8–5.3)
LYMPHOCYTES NFR BLD AUTO: 24 %
MCH RBC QN AUTO: 32.5 PG (ref 26.5–33)
MCHC RBC AUTO-ENTMCNC: 36.4 G/DL (ref 31.5–36.5)
MCV RBC AUTO: 89 FL (ref 78–100)
MONOCYTES # BLD AUTO: 0.5 10E3/UL (ref 0–1.3)
MONOCYTES NFR BLD AUTO: 8 %
NEUTROPHILS # BLD AUTO: 4 10E3/UL (ref 1.6–8.3)
NEUTROPHILS NFR BLD AUTO: 65 %
NRBC # BLD AUTO: 0 10E3/UL
NRBC BLD AUTO-RTO: 0 /100
PLATELET # BLD AUTO: 152 10E3/UL (ref 150–450)
POTASSIUM BLD-SCNC: 3.7 MMOL/L (ref 3.5–5.1)
PROT SERPL-MCNC: 6.2 G/DL (ref 6.4–8.9)
RBC # BLD AUTO: 4.95 10E6/UL (ref 4.4–5.9)
SODIUM SERPL-SCNC: 140 MMOL/L (ref 134–144)
WBC # BLD AUTO: 6.1 10E3/UL (ref 4–11)

## 2021-11-01 PROCEDURE — 36415 COLL VENOUS BLD VENIPUNCTURE: CPT | Mod: ZL

## 2021-11-01 PROCEDURE — 85004 AUTOMATED DIFF WBC COUNT: CPT | Mod: ZL

## 2021-11-01 PROCEDURE — 82040 ASSAY OF SERUM ALBUMIN: CPT | Mod: ZL

## 2022-01-24 ENCOUNTER — ALLIED HEALTH/NURSE VISIT (OUTPATIENT)
Dept: FAMILY MEDICINE | Facility: OTHER | Age: 67
End: 2022-01-24
Attending: FAMILY MEDICINE
Payer: MEDICARE

## 2022-01-24 DIAGNOSIS — R05.9 COUGH: ICD-10-CM

## 2022-01-24 DIAGNOSIS — Z20.822 EXPOSURE TO 2019 NOVEL CORONAVIRUS: Primary | ICD-10-CM

## 2022-01-24 PROCEDURE — U0005 INFEC AGEN DETEC AMPLI PROBE: HCPCS | Mod: ZL

## 2022-01-24 PROCEDURE — C9803 HOPD COVID-19 SPEC COLLECT: HCPCS

## 2022-01-24 NOTE — PROGRESS NOTES
Patient here for Covid Testing. Exposure and cough.    Terri Alfredo MA on 1/24/2022 at 10:01 AM

## 2022-01-25 ENCOUNTER — LAB (OUTPATIENT)
Dept: LAB | Facility: OTHER | Age: 67
End: 2022-01-25
Payer: MEDICARE

## 2022-01-25 DIAGNOSIS — Z79.899 ENCOUNTER FOR LONG-TERM (CURRENT) USE OF MEDICATIONS: ICD-10-CM

## 2022-01-25 DIAGNOSIS — Z94.4 LIVER REPLACED BY TRANSPLANT (H): ICD-10-CM

## 2022-01-25 LAB
ALBUMIN SERPL-MCNC: 4.3 G/DL (ref 3.5–5.7)
ALP SERPL-CCNC: 80 U/L (ref 34–104)
ALT SERPL W P-5'-P-CCNC: 11 U/L (ref 7–52)
ANION GAP SERPL CALCULATED.3IONS-SCNC: 10 MMOL/L (ref 3–14)
AST SERPL W P-5'-P-CCNC: 13 U/L (ref 13–39)
BASOPHILS # BLD AUTO: 0 10E3/UL (ref 0–0.2)
BASOPHILS NFR BLD AUTO: 0 %
BILIRUB SERPL-MCNC: 1.1 MG/DL (ref 0.3–1)
BUN SERPL-MCNC: 13 MG/DL (ref 7–25)
CALCIUM SERPL-MCNC: 9.3 MG/DL (ref 8.6–10.3)
CHLORIDE BLD-SCNC: 104 MMOL/L (ref 98–107)
CO2 SERPL-SCNC: 27 MMOL/L (ref 21–31)
CREAT SERPL-MCNC: 1 MG/DL (ref 0.7–1.3)
EOSINOPHIL # BLD AUTO: 0.1 10E3/UL (ref 0–0.7)
EOSINOPHIL NFR BLD AUTO: 2 %
ERYTHROCYTE [DISTWIDTH] IN BLOOD BY AUTOMATED COUNT: 12.9 % (ref 10–15)
GFR SERPL CREATININE-BSD FRML MDRD: 84 ML/MIN/1.73M2
GLUCOSE BLD-MCNC: 102 MG/DL (ref 70–105)
HCT VFR BLD AUTO: 46.3 % (ref 40–53)
HGB BLD-MCNC: 16.5 G/DL (ref 13.3–17.7)
IMM GRANULOCYTES # BLD: 0 10E3/UL
IMM GRANULOCYTES NFR BLD: 0 %
LYMPHOCYTES # BLD AUTO: 1.6 10E3/UL (ref 0.8–5.3)
LYMPHOCYTES NFR BLD AUTO: 28 %
MCH RBC QN AUTO: 31.7 PG (ref 26.5–33)
MCHC RBC AUTO-ENTMCNC: 35.6 G/DL (ref 31.5–36.5)
MCV RBC AUTO: 89 FL (ref 78–100)
MONOCYTES # BLD AUTO: 0.5 10E3/UL (ref 0–1.3)
MONOCYTES NFR BLD AUTO: 8 %
NEUTROPHILS # BLD AUTO: 3.6 10E3/UL (ref 1.6–8.3)
NEUTROPHILS NFR BLD AUTO: 62 %
NRBC # BLD AUTO: 0 10E3/UL
NRBC BLD AUTO-RTO: 0 /100
PLATELET # BLD AUTO: 171 10E3/UL (ref 150–450)
POTASSIUM BLD-SCNC: 3.8 MMOL/L (ref 3.5–5.1)
PROT SERPL-MCNC: 6.7 G/DL (ref 6.4–8.9)
RBC # BLD AUTO: 5.2 10E6/UL (ref 4.4–5.9)
SARS-COV-2 RNA RESP QL NAA+PROBE: NORMAL
SODIUM SERPL-SCNC: 141 MMOL/L (ref 134–144)
WBC # BLD AUTO: 5.8 10E3/UL (ref 4–11)

## 2022-01-25 PROCEDURE — 85025 COMPLETE CBC W/AUTO DIFF WBC: CPT | Mod: ZL

## 2022-01-25 PROCEDURE — 36415 COLL VENOUS BLD VENIPUNCTURE: CPT | Mod: ZL

## 2022-01-25 PROCEDURE — 80053 COMPREHEN METABOLIC PANEL: CPT | Mod: ZL

## 2022-01-26 ENCOUNTER — TELEPHONE (OUTPATIENT)
Dept: NURSING | Facility: CLINIC | Age: 67
End: 2022-01-26
Payer: COMMERCIAL

## 2022-01-26 ENCOUNTER — TELEPHONE (OUTPATIENT)
Dept: LAB | Facility: OTHER | Age: 67
End: 2022-01-26
Payer: COMMERCIAL

## 2022-01-26 LAB — SARS-COV-2 RNA RESP QL NAA+PROBE: DETECTED

## 2022-01-26 NOTE — TELEPHONE ENCOUNTER
Coronavirus (COVID-19) Notification     Reason for call  Patient requesting results     Lab Result    Lab test 2019-nCoV rRt-PCR in process        RN Recommendations/Instructions per [facility]  Continue to quarantine and follow the instructions given at your testing visit until you receive the results.     Please Contact your PCP clinic or return to the Emergency department if your:    Symptoms worsen or other concerning symptom's.     Patient informed that if test for COVID19 is POSITIVE,  you will receive a call typically within 48 hours from the test date (date lab collected).  If NEGATIVE result, you will receive a letter in the mail or MyChart.      Elli Villanueva LPN

## 2022-01-26 NOTE — TELEPHONE ENCOUNTER
"Coronavirus (COVID-19) Notification    Caller Name (Patient, parent, daughter/son, grandparent, etc)  Latonia Baueren    Reason for call  Notify of Positive Coronavirus (COVID-19) lab results, assess symptoms,  review  [employer] [facility] recommendations    Lab Result    Lab test:  2019-nCoV rRt-PCR or SARS-CoV-2 PCR    Oropharyngeal AND/OR nasopharyngeal swabs is POSITIVE for 2019-nCoV RNA/SARS-COV-2 PCR (COVID-19 virus)    RN Recommendations/Instructions per [facility] Coronavirus COVID-19 recommendations    Brief introduction script  Introduce self then review script:  \"I am calling on behalf of [employer].  We were notified that your Coronavirus test (COVID-19) for was POSITIVE for the virus.  I have some information to relay to you but first I wanted to mention that the [place] Dept of Health will be contacting you shortly [it's possible MD already called Patient] to talk to you more about how you are feeling and other people you have had contact with who might now also have the virus.  Also,  [employer] is Partnering with the [facility] for Covid-19 research, you may be contacted directly by research staff.\"      Assessment (Inquire about Patient's current symptoms)   Assessment   Current Symptoms at time of phone call: (if no symptoms, document No symptoms] Chest cold   Symptoms onset (if applicable) 01/22/22     If at time of call, Patients symptoms hare worsened, the Patient should contact 911 or have someone drive them to Emergency Dept promptly:      If Patient calling 911, inform 911 personal that you have tested positive for the Coronavirus (COVID-19).  Place mask on and await 911 to arrive.    If Emergency Dept, If possible, please have another adult drive you to the Emergency Dept but you need to wear mask when in contact with other people.          Treatment Options:   Patient classified as COVID treatment eligible by Epic high risk algorithm: Yes  Is the patient symptomatic at the " time of result notification? Yes. Was the onset of symptoms within the last 5 days? Yes.   Inform patient they may be eligible for a therapeutic treatment option that may reduce complications and hospitalization risk related to COVID19. These options would be discussed during a virtual visit with a provider.   Does the patient agree to have a visit with a provider to discuss treatment options? No.  You may be eligible to receive a new treatment with a monoclonal antibody for preventing hospitalization in patients at high risk for complications from COVID-19.   This medication is still experimental and available on a limited basis; it is given through an IV and must be given at an infusion center. Please note that not all people who are eligible will receive the medication since it is in limited supply. Are you interested in being considered for this medication?   Yes.   Is the patient symptomatic?  Yes. Is the patient 18 years of age or older? Yes.  Is the patient newly (within the last week) on supplemental oxygen or requiring more oxygen than usual?  Yes. Patient does not qualify.    Review information with Patient    Your result was positive. This means you have COVID-19 (coronavirus).  We have sent you a letter that reviews the information that I'll be reviewing with you now.    How can I protect others?    If you have symptoms: stay home and away from others (self-isolate) until:    You've had no fever--and no medicine that reduces fever--for 1 full day (24 hours). And       Your other symptoms have gotten better. For example, your cough or breathing has improved. And     At least 10 days have passed since your symptoms started. (If you've been told by a doctor that you have a weak immune system, wait 20 days.)     If you don't have symptoms: Stay home and away from others (self-isolate) until at least 10 days have passed since your first positive COVID-19 test. (Date test collected)    During this  time:    Stay in your own room, including for meals. Use your own bathroom if you can.    Stay away from others in your home. No hugging, kissing or shaking hands. No visitors.     Don't go to work, school or anywhere else.     Clean  high touch  surfaces often (doorknobs, counters, handles, etc.). Use a household cleaning spray or wipes. You'll find a full list on the EPA website at www.epa.gov/pesticide-registration/list-n-disinfectants-use-against-sars-cov-2.     Cover your mouth and nose with a mask, tissue or other face covering to avoid spreading germs.    Wash your hands and face often with soap and water.    Make a list of people you have been in close contact with recently, even if either of you wore a face covering.   - Start your list from 2 days before you became ill or had a positive test.  - Include anyone that was within 6 feet of you for a cumulative total of 15 minutes or more in 24 hours. (Example: if you sat next to Dudley for 5 minutes in the morning and 10 minutes in the afternoon, then you were in close contact for 15 minutes total that day. Dudley would be added to your list.)    A public health worker will call or text you. It is important that you answer. They will ask you questions about possible exposures to COVID-19, such as people you have been in direct contact with and places you have visited.    Tell the people on your list that you have COVID-19; they should stay away from others for 14 days starting from the last time they were in contact with you (unless you are told something different from a public health worker).     Caregivers in these groups are at risk for severe illness due to COVID-19:  o People 65 years and older  o People who live in a nursing home or long-term care facility  o People with chronic disease (lung, heart, cancer, diabetes, kidney, liver, immunologic)  o People who have a weakened immune system, including those who:  - Are in cancer treatment  - Take medicine  that weakens the immune system, such as corticosteroids  - Had a bone marrow or organ transplant  - Have an immune deficiency  - Have poorly controlled HIV or AIDS  - Are obese (body mass index of 40 or higher)  - Smoke regularly    Caregivers should wear gloves while washing dishes, handling laundry and cleaning bedrooms and bathrooms.    Wash and dry laundry with special caution. Don't shake dirty laundry, and use the warmest water setting you can.    If you have a weakened immune system, ask your doctor about other actions you should take.    For more tips, go to www.cdc.gov/coronavirus/2019-ncov/downloads/10Things.pdf.    You should not go back to work until you meet the guidelines above for ending your home isolation. You don't need to be retested for COVID-19 before going back to work--studies show that you won't spread the virus if it's been at least 10 days since your symptoms started (or 20 days, if you have a weak immune system).    Employers: This document serves as formal notice of your employee's medical guidelines for going back to work. They must meet the above guidelines before going back to work in person.    How can I take care of myself?    1. Get lots of rest. Drink extra fluids (unless a doctor has told you not to).    2. Take Tylenol (acetaminophen) for fever or pain. If you have liver or kidney problems, ask your family doctor if it's okay to take Tylenol.     Take either:     650 mg (two 325 mg pills) every 4 to 6 hours, or     1,000 mg (two 500 mg pills) every 8 hours as needed.     Note: Don't take more than 3,000 mg in one day. Acetaminophen is found in many medicines (both prescribed and over-the-counter medicines). Read all labels to be sure you don't take too much.    For children, check the Tylenol bottle for the right dose (based on their age or weight).    3. If you have other health problems (like cancer, heart failure, an organ transplant or severe kidney disease): Call your  specialty clinic if you don't feel better in the next 2 days.    4. Know when to call 911: Emergency warning signs include:    Trouble breathing or shortness of breath    Pain or pressure in the chest that doesn't go away    Feeling confused like you haven't felt before, or not being able to wake up    Bluish-colored lips or face    5. Sign up for mii. We know it's scary to hear that you have COVID-19. We want to track your symptoms to make sure you're okay over the next 2 weeks. Please look for an email from mii--this is a free, online program that we'll use to keep in touch. To sign up, follow the link in the email. Learn more at [email or web address].    Where can I get more information?    [facility]: [email or web address]    Coronavirus Basics: www.health.[facility].[place]/diseases/coronavirus/basics.html    What to Do If You're Sick: www.cdc.gov/coronavirus/2019-ncov/about/steps-when-sick.html    Ending Home Isolation: www.cdc.gov/coronavirus/2019-ncov/hcp/disposition-in-home-patients.html     Caring for Someone with COVID-19: www.cdc.gov/coronavirus/2019-ncov/if-you-are-sick/care-for-someone.html     [facility] clinical trials (COVID-19 research studies): clinicalaffairs.[facility]/n-clinical-trials     A Positive COVID-19 letter will be sent via [employer] or the mail. (Exception, no letters sent to Presurgerical/Preprocedure Patients)    Latonia Coffey

## 2022-02-15 ENCOUNTER — TELEPHONE (OUTPATIENT)
Dept: INTERNAL MEDICINE | Facility: OTHER | Age: 67
End: 2022-02-15
Payer: COMMERCIAL

## 2022-02-15 DIAGNOSIS — G25.81 RESTLESS LEG SYNDROME: ICD-10-CM

## 2022-02-15 RX ORDER — PRAMIPEXOLE DIHYDROCHLORIDE 0.25 MG/1
0.5 TABLET ORAL AT BEDTIME
Qty: 30 TABLET | Refills: 0 | Status: SHIPPED | OUTPATIENT
Start: 2022-02-15 | End: 2022-03-02

## 2022-02-15 NOTE — TELEPHONE ENCOUNTER
The patient called regarding this medication.  the patient will be out of this medication on Saturday.

## 2022-02-15 NOTE — TELEPHONE ENCOUNTER
Pharmacy notified that he is given a short supply for now until he is seen on 3/2/22.  Demetrice Mirza CMA([facility])..................2/15/2022   5:00 PM

## 2022-02-15 NOTE — LETTER
February 15, 2022      Philipp Jefferson     [place] [address]        Dear Philipp,     This letter is to remind you that you are due for your annual exam with Ramon Vinson. Your last comprehensive visit was more than 12 months ago.    Please call the clinic at [phone] to schedule your appointment.    If you should require additional refills before your scheduled appointment, please contact your pharmacy and we will refill your medication until the date of your appointment.    If you are no longer seeing Ramon Vinson for primary care, please call to let us know. Doing so will remove you from our call/contact list.      Thank you for choosing [facility] and [facility] for your health care needs.    Sincerely,    Refill MELO  [facility]

## 2022-02-15 NOTE — TELEPHONE ENCOUNTER
Patient was contacted and an appointment was made with Dr. Vinson for 3.2.2022.  He will need a small prescription sent in to get him to that date.  Cuca George on 2/15/2022 at 3:22 PM

## 2022-02-15 NOTE — TELEPHONE ENCOUNTER
Overdue for Annual Medicare wellness visit / physical.     Please call patient and schedule.  Okay to use 4 PM -- 40 minute spot at the end of the day if needed.    We can send in a small Prescription refill if needed to get to appointment.     Ramon Vinson MD

## 2022-02-15 NOTE — TELEPHONE ENCOUNTER
Pramipexole 0.25 mg tablet prescription received today. 30 tablet quantity is only a 15 day supply, they are wondering if Dr. Vinson wants a 60 tablet quantity for a 30 day supply. Please respond.     Kelsey Newell on 2/15/2022 at 4:29 PM

## 2022-03-01 NOTE — PROGRESS NOTES
Assessment & Plan        ICD-10-CM    1. Restless leg syndrome  G25.81 pramipexole (MIRAPEX) 0.25 MG tablet     gabapentin (NEURONTIN) 300 MG capsule   2. Benign essential hypertension  I10 furosemide (LASIX) 40 MG tablet     lisinopril (ZESTRIL) 10 MG tablet     amLODIPine (NORVASC) 10 MG tablet   3. Bilateral lower extremity edema  R60.0 furosemide (LASIX) 40 MG tablet   4. Encounter for Medicare annual wellness exam  Z00.00    5. 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 Mild chronic obstructive pulmonary disease (H)  J44.9    7. Morbid obesity (H)  E66.01    8. Thrombocytopenia (H)  D69.6    9. Mixed hyperlipidemia  E78.2    10. History of liver transplant (H)  Z94.4 cycloSPORINE modified (GENERIC EQUIVALENT) 25 MG capsule     mycophenolate (GENERIC EQUIVALENT) 500 MG tablet   11. Immunocompromised state due to drug therapy (H)  D84.821 cycloSPORINE modified (GENERIC EQUIVALENT) 25 MG capsule    Z79.899 mycophenolate (GENERIC EQUIVALENT) 500 MG tablet   12. Vaccine counseling  Z71.85    Patient presents for annual Medicare wellness visit as well as follow-up multiple issues.    Restless leg syndrome.  This is been an ongoing issue.  Has been out of his Mirapex for a few days and had a very difficult time sleeping.  Continues with gabapentin in addition to Mirapex.  Needs refills.    Hypertension.  Blood pressure is currently well controlled.  Denies syncope or presyncope peer doing well with current medication regimen.  No changes for now.  Needs medication refills.  He is both placed lisinopril, amlodipine.    Lower extremity edema.  Chronic, ongoing.  Improved with Lasix.  Needs refills.    History of thrombotic microangiopathy.  Currently appears stable.  Monitor.    Mild COPD.  Reports breathing is currently at baseline.  No changes in medications as requested at this time.    Thrombocytopenia.  Chronic.  Currently stable.    History of liver transplant with history of liver cirrhosis.  Currently taking  "cyclosporine and cough in the late.  Gets his medications through his transplant coordinators/transplant providers.  Med list updated today.    Mixed hyperlipidemia.  Last lipid panel showed elevated LDL, not at goal.  Currently managing with diet.  Encouraged healthy diet exercise and weight loss.  If this is not adequate we may need to consider starting statin therapy.  Recent Labs   Lab Test 02/19/21  0715   CHOL 170   HDL 32   *   TRIG 148     Vaccine counseling completed.  Encouraged consideration of shingles vaccination.  COVID-19 booster shot.  Flu shot.    Encouraged weight loss and regular exercise.     Return in about 1 year (around 3/3/2023) for Annual Medicare Wellness Visit.    Ramon Vinson MD  [facility]     Rachele Segal is a 66 year old who presents for the following health issues     Healthy Habits:     In general, how would you rate your overall health?  Good    Frequency of exercise:  2-3 days/week    Duration of exercise:  15-30 minutes    Do you usually eat at least 4 servings of fruit and vegetables a day, include whole grains    & fiber and avoid regularly eating high fat or \"junk\" foods?  Yes    Taking medications regularly:  Yes    Medication side effects:  None    Ability to successfully perform activities of daily living:  No assistance needed    Home Safety:  Lighting is poor    Hearing Impairment:  No hearing concerns    In the past 6 months, have you been bothered by leaking of urine?  No    In general, how would you rate your overall mental or emotional health?  Excellent      PHQ-2 Total Score: 0    Additional concerns today:  No       Annual Wellness Visit  Patient has been advised of split billing requirements and indicates understanding: Yes     Are you in the first 12 months of your Medicare Part B coverage?  No      Do you feel safe in your environment? Yes    Have you ever done Advance Care Planning? (For example, a Health Directive, POLST, or " a discussion with a medical provider or your loved ones about your wishes)? No, advance care planning information given to patient to review.  Patient declined advance care planning discussion at this time.    Fall risk:  Fallen 2 or more times in the past year?: No  Any fall with injury in the past year?: No    Cognitive Screenin) Repeat 3 items (Leader, Season, Table)    2) Clock draw: NORMAL  3) 3 item recall: Recalls NO objects   Results: 0 items recalled: PROBABLE COGNITIVE IMPAIRMENT, **INFORM PROVIDER**    Mini-CogTM Copyright [employer] [place]. Licensed by the author for use in [employer]; reprinted with permission (christy@[facility]). All rights reserved.      Do you have sleep apnea, excessive snoring or daytime drowsiness?: no    Current providers sharing in care for this patient include:   Patient Care Team:  Ramon Vinson MD as PCP - General (Internal Medicine)  Ramon Vinson MD as Assigned PCP    Patient has been advised of split billing requirements and indicates understanding: Yes     Review of Systems   Constitutional: Negative for chills and fever.   HENT: Negative for congestion and hearing loss.    Eyes: Negative for visual disturbance.   Respiratory: Negative for cough, shortness of breath and wheezing.    Cardiovascular: Positive for peripheral edema. Negative for chest pain and palpitations.   Gastrointestinal: Negative for abdominal distention, abdominal pain, diarrhea, nausea and vomiting.   Endocrine: Negative for cold intolerance and heat intolerance.   Genitourinary: Negative for dysuria and hematuria.   Musculoskeletal: Negative for arthralgias and myalgias.   Skin: Negative for rash and wound.   Allergic/Immunologic: Positive for immunocompromised state.   Neurological: Negative for dizziness and light-headedness.   Hematological: Does not bruise/bleed easily.   Psychiatric/Behavioral: Negative for agitation and confusion.          Objective    /78   Pulse 104   Temp  "98.5  F (36.9  C) (Tympanic)   Resp 20   Ht 1.715 m (5' 7.5\")   Wt 126.7 kg (279 lb 6.4 oz)   SpO2 97%   BMI 43.11 kg/m    Body mass index is 43.11 kg/m .  Physical Exam  Constitutional:       General: He is not in acute distress.     Appearance: He is well-developed. He is obese. He is not diaphoretic.   HENT:      Head: Normocephalic and atraumatic.   Eyes:      General: No scleral icterus.     Conjunctiva/sclera: Conjunctivae normal.   Neck:      Vascular: No carotid bruit.   Cardiovascular:      Rate and Rhythm: Normal rate and regular rhythm.      Pulses: Normal pulses.   Pulmonary:      Effort: Pulmonary effort is normal.      Breath sounds: Normal breath sounds.   Abdominal:      General: There is distension.      Palpations: Abdomen is soft.      Tenderness: There is no abdominal tenderness. There is no right CVA tenderness, left CVA tenderness, guarding or rebound.   Musculoskeletal:         General: No deformity.      Cervical back: Neck supple.      Right lower le+ Pitting Edema present.      Left lower le+ Pitting Edema present.   Lymphadenopathy:      Cervical: No cervical adenopathy.   Skin:     General: Skin is warm and dry.      Findings: No rash.   Neurological:      Mental Status: He is alert and oriented to person, place, and time. Mental status is at baseline.   Psychiatric:         Mood and Affect: Mood normal.         Behavior: Behavior normal.        Recent Labs   Lab Test 22  0709 21  0710 21  0708 21  0708 21  0710 21  0715 21  0717   A1C  --   --   --   --   --  5.1  --    LDL  --   --   --   --   --  108*  --    HDL  --   --   --   --   --  32  --    TRIG  --   --   --   --   --  148  --    ALT 11 10  --  11 12  --  10   CR 1.00 0.87   < > 0.94 0.92  --  0.97   GFRESTIMATED 84 >90   < > 86 83  --  78   GFRESTBLACK  --   --   --   --  >90  --  >90   POTASSIUM 3.8 3.7   < > 3.7 4.2  --  4.0    < > = values in this interval not displayed. "   ALT is normal.  Creatinine has worsened slightly.  Potassium is normal.

## 2022-03-02 ENCOUNTER — OFFICE VISIT (OUTPATIENT)
Dept: INTERNAL MEDICINE | Facility: OTHER | Age: 67
End: 2022-03-02
Attending: INTERNAL MEDICINE
Payer: COMMERCIAL

## 2022-03-02 VITALS
BODY MASS INDEX: 42.34 KG/M2 | WEIGHT: 279.4 LBS | SYSTOLIC BLOOD PRESSURE: 126 MMHG | OXYGEN SATURATION: 97 % | DIASTOLIC BLOOD PRESSURE: 78 MMHG | RESPIRATION RATE: 20 BRPM | HEIGHT: 68 IN | TEMPERATURE: 98.5 F | HEART RATE: 104 BPM

## 2022-03-02 DIAGNOSIS — R60.0 BILATERAL LOWER EXTREMITY EDEMA: ICD-10-CM

## 2022-03-02 DIAGNOSIS — Z79.899 IMMUNOCOMPROMISED STATE DUE TO DRUG THERAPY (H): ICD-10-CM

## 2022-03-02 DIAGNOSIS — Z00.00 ENCOUNTER FOR MEDICARE ANNUAL WELLNESS EXAM: ICD-10-CM

## 2022-03-02 DIAGNOSIS — M31.10 THROMBOTIC MICROANGIOPATHY (H): ICD-10-CM

## 2022-03-02 DIAGNOSIS — D84.821 IMMUNOCOMPROMISED STATE DUE TO DRUG THERAPY (H): ICD-10-CM

## 2022-03-02 DIAGNOSIS — G25.81 RESTLESS LEG SYNDROME: Primary | ICD-10-CM

## 2022-03-02 DIAGNOSIS — Z71.85 VACCINE COUNSELING: ICD-10-CM

## 2022-03-02 DIAGNOSIS — E78.2 MIXED HYPERLIPIDEMIA: ICD-10-CM

## 2022-03-02 DIAGNOSIS — I10 BENIGN ESSENTIAL HYPERTENSION: ICD-10-CM

## 2022-03-02 DIAGNOSIS — Z94.4 HISTORY OF LIVER TRANSPLANT (H): ICD-10-CM

## 2022-03-02 DIAGNOSIS — E66.01 MORBID OBESITY (H): ICD-10-CM

## 2022-03-02 DIAGNOSIS — J44.9 MILD CHRONIC OBSTRUCTIVE PULMONARY DISEASE (H): ICD-10-CM

## 2022-03-02 DIAGNOSIS — D69.6 THROMBOCYTOPENIA (H): ICD-10-CM

## 2022-03-02 PROCEDURE — G0439 PPPS, SUBSEQ VISIT: HCPCS | Performed by: INTERNAL MEDICINE

## 2022-03-02 PROCEDURE — G0463 HOSPITAL OUTPT CLINIC VISIT: HCPCS

## 2022-03-02 PROCEDURE — 99214 OFFICE O/P EST MOD 30 MIN: CPT | Mod: 25 | Performed by: INTERNAL MEDICINE

## 2022-03-02 RX ORDER — FUROSEMIDE 40 MG
40 TABLET ORAL 2 TIMES DAILY
Qty: 180 TABLET | Refills: 4 | Status: SHIPPED | OUTPATIENT
Start: 2022-03-02 | End: 2023-04-06

## 2022-03-02 RX ORDER — MYCOPHENOLATE MOFETIL 500 MG/1
1000 TABLET ORAL 2 TIMES DAILY
Qty: 1 TABLET | Refills: 0 | Status: SHIPPED | OUTPATIENT
Start: 2022-03-02 | End: 2022-04-19

## 2022-03-02 RX ORDER — GABAPENTIN 300 MG/1
300 CAPSULE ORAL 2 TIMES DAILY
Qty: 180 CAPSULE | Refills: 4 | Status: SHIPPED | OUTPATIENT
Start: 2022-03-02 | End: 2023-04-06

## 2022-03-02 RX ORDER — LISINOPRIL 10 MG/1
10 TABLET ORAL DAILY
Qty: 90 TABLET | Refills: 4 | Status: SHIPPED | OUTPATIENT
Start: 2022-03-02 | End: 2023-03-28

## 2022-03-02 RX ORDER — PRAMIPEXOLE DIHYDROCHLORIDE 0.25 MG/1
0.5 TABLET ORAL AT BEDTIME
Qty: 180 TABLET | Refills: 4 | Status: SHIPPED | OUTPATIENT
Start: 2022-03-02 | End: 2023-04-06

## 2022-03-02 RX ORDER — AMLODIPINE BESYLATE 10 MG/1
10 TABLET ORAL DAILY
Qty: 90 TABLET | Refills: 4 | Status: SHIPPED | OUTPATIENT
Start: 2022-03-02 | End: 2024-05-29

## 2022-03-02 RX ORDER — CYCLOSPORINE 25 MG/1
75 CAPSULE, LIQUID FILLED ORAL DAILY
Qty: 1 CAPSULE | Refills: 0 | Status: SHIPPED | OUTPATIENT
Start: 2022-03-02

## 2022-03-02 ASSESSMENT — PAIN SCALES - GENERAL: PAINLEVEL: NO PAIN (0)

## 2022-03-02 ASSESSMENT — ENCOUNTER SYMPTOMS
CONFUSION: 0
BRUISES/BLEEDS EASILY: 0
WOUND: 0
PALPITATIONS: 0
COUGH: 0
DIZZINESS: 0
HEMATURIA: 0
ABDOMINAL PAIN: 0
MYALGIAS: 0
DIARRHEA: 0
ABDOMINAL DISTENTION: 0
WHEEZING: 0
AGITATION: 0
ARTHRALGIAS: 0
FEVER: 0
LIGHT-HEADEDNESS: 0
VOMITING: 0
CHILLS: 0
DYSURIA: 0
SHORTNESS OF BREATH: 0
NAUSEA: 0

## 2022-03-02 ASSESSMENT — ACTIVITIES OF DAILY LIVING (ADL): CURRENT_FUNCTION: NO ASSISTANCE NEEDED

## 2022-03-02 NOTE — PATIENT INSTRUCTIONS
Patient Education   Personalized Prevention Plan  You are due for the preventive services outlined below.  Your care team is available to assist you in scheduling these services.  If you have already completed any of these items, please share that information with your care team to update in your medical record.  Health Maintenance Due   Topic Date Due     LUNG CANCER SCREENING  Never done     Zoster (Shingles) Vaccine (2 of 2) 08/22/2019     COVID-19 Vaccine (3 - Moderna risk 4-dose series) 04/29/2021     Flu Vaccine (1) 09/01/2021     Preventive Health Recommendations  See your health care provider every year to    Review health changes.     Discuss preventive care.      Review your medicines if your doctor has prescribed any.    Talk with your health care provider about whether you should have a test to screen for prostate cancer (PSA).    Every 3 years, have a diabetes test (fasting glucose). If you are at risk for diabetes, you should have this test more often.    Every 5 years, have a cholesterol test. Have this test more often if you are at risk for high cholesterol or heart disease.     Every 10 years, have a colonoscopy. Or, have a yearly FIT test (stool test). These exams will check for colon cancer.    Talk to with your health care provider about screening for Abdominal Aortic Aneurysm if you have a family history of AAA or have a history of smoking.    Shots:     Get a flu shot each year.     Get a tetanus shot every 10 years.     Talk to your doctor about your pneumonia vaccines. There are now two you should receive - Pneumovax (PPSV 23) and Prevnar (PCV 13).    Talk to your pharmacist about a shingles vaccine.     Talk to your doctor about the hepatitis B vaccine.    Nutrition:     Eat at least 5 servings of fruits and vegetables each day.     Eat whole-grain bread, whole-wheat pasta and brown rice instead of white grains and rice.     Get adequate Calcium and Vitamin D.     Lifestyle    Exercise for  "at least 150 minutes a week (30 minutes a day, 5 days a week). This will help you control your weight and prevent disease.     Limit alcohol to one drink per day.     No smoking.     Wear sunscreen to prevent skin cancer.     See your dentist every six months for an exam and cleaning.     See your eye doctor every 1 to 2 years to screen for conditions such as glaucoma, macular degeneration and cataracts.    Personalized Prevention Plan  You are due for the preventive services outlined below.  Your care team is available to assist you in scheduling these services.  If you have already completed any of these items, please share that information with your care team to update in your medical record.  Health Maintenance   Topic Date Due     LUNG CANCER SCREENING  Never done     ZOSTER IMMUNIZATION (2 of 2) 08/22/2019     COVID-19 Vaccine (3 - Moderna risk 4-dose series) 04/29/2021     INFLUENZA VACCINE (1) 09/01/2021     DTAP/TDAP/TD IMMUNIZATION (2 - Td or Tdap) 12/12/2022     MEDICARE ANNUAL WELLNESS VISIT  03/02/2023     Pneumococcal Vaccine: Pediatrics (0 to 5 Years) and At-Risk Patients (6 to 64 Years) (2 of 2 - PPSV23) 06/27/2024     LIPID  02/19/2026     ADVANCE CARE PLANNING  03/02/2027     COLORECTAL CANCER SCREENING  10/27/2031     HEPATITIS C SCREENING  Completed     PHQ-2  Completed     HEPATITIS B IMMUNIZATION  Completed     HIV SCREENING  Addressed     IPV IMMUNIZATION  Aged Out     MENINGITIS IMMUNIZATION  Aged Out         Your Body mass index (BMI) is:  Estimated body mass index is 43.11 kg/m  as calculated from the following:    Height as of this encounter: 1.715 m (5' 7.5\").    Weight as of this encounter: 126.7 kg (279 lb 6.4 oz).   (BMI ranges: Normal 18.5 - 25, Overweight 25 - 30, Obesity greater than 30)     Facts about losing weight:   -- Overweight and Obesity increase your risk for developing diabetes, high blood pressure and stroke, and shorten your life.   -- 90% of weight loss comes from " dietary changes, only 10% from exercise   -- For every 1 pound of of weight loss -- this is equal to about 8 to 10 pounds of weight off of your knees.   -- there are about 3500 calories in 1 pound of body weight.     -- Goal Caloric intake -- 1500 to 1700 calories daily.     Get out and exercise, bike ride, walk for 10 to 15 minutes after each meal -- this can significantly lowers the spike in blood sugar after eating.     To help with weight loss and improve blood sugar control....    -- Try to avoid Carbohydrates as much as possible -- breads, pasta, baked goods, cakes, oatmeal, cold cereal, potatoes.   -- Eat more lean meats, proteins, eggs, nuts, vegetables.    -- Start or Continue regular daily exercise.      -- Eat more lean meats, proteins, eggs, nuts, vegetables.      What have successful people done to lose large amounts of weight, and keep it off?   -- Used both diet and exercise to lose weight   -- Ate a healthy breakfast every day   -- Exercised an hour per day   -- Watched less than 10 hours of TV per week   -- Weighed themselves weekly   -- Drink a large glass of water 10-15 minutes before your meals.   -- Use smaller dinner plates and don't go back for seconds.     What should I do?   -- Work on 5-10% weight loss   -- Focus on a few healthy dietary changes   -- Eat more fresh fruits and vegetables, and fewer carbohydrates (http://myplate.gov/)   -- Exercise by some means -- every day   -- Weigh yourself once a week    Weight Management   Weight [id number]     Meets Mondays 9:30, 11:45, or 5:30    Job Khan, [address] [place], [place]     Contact Mery [phone] [email or web address]  [employer] [email or web address]    -- Consider My Fitness Pal on your smart phone  [email or web address]/       Blood pressure is well controlled.   Medications refilled.     Lab on 01/25/2022   Component Date Value Ref Range Status     Sodium 01/25/2022 141  134 - 144 mmol/L Final     Potassium  01/25/2022 3.8  3.5 - 5.1 mmol/L Final     Chloride 01/25/2022 104  98 - 107 mmol/L Final     Carbon Dioxide (CO2) 01/25/2022 27  21 - 31 mmol/L Final     Anion Gap 01/25/2022 10  3 - 14 mmol/L Final     Urea Nitrogen 01/25/2022 13  7 - 25 mg/dL Final     Creatinine 01/25/2022 1.00  0.70 - 1.30 mg/dL Final     Calcium 01/25/2022 9.3  8.6 - 10.3 mg/dL Final     Glucose 01/25/2022 102  70 - 105 mg/dL Final     Alkaline Phosphatase 01/25/2022 80  34 - 104 U/L Final     AST 01/25/2022 13  13 - 39 U/L Final     ALT 01/25/2022 11  7 - 52 U/L Final     Protein Total 01/25/2022 6.7  6.4 - 8.9 g/dL Final     Albumin 01/25/2022 4.3  3.5 - 5.7 g/dL Final     Bilirubin Total 01/25/2022 1.1 (A) 0.3 - 1.0 mg/dL Final     GFR Estimate 01/25/2022 84  >60 mL/min/1.73m2 Final    Effective December 21, 2021 eGFRcr in adults is calculated using the 2021 CKD-EPI creatinine equation which includes age and gender (Latoya et al., NE, DOI: 10.1056/QJZQoi5558404)     WBC Count 01/25/2022 5.8  4.0 - 11.0 10e3/uL Final     RBC Count 01/25/2022 5.20  4.40 - 5.90 10e6/uL Final     Hemoglobin 01/25/2022 16.5  13.3 - 17.7 g/dL Final     Hematocrit 01/25/2022 46.3  40.0 - 53.0 % Final     MCV 01/25/2022 89  78 - 100 fL Final     MCH 01/25/2022 31.7  26.5 - 33.0 pg Final     MCHC 01/25/2022 35.6  31.5 - 36.5 g/dL Final     RDW 01/25/2022 12.9  10.0 - 15.0 % Final     Platelet Count 01/25/2022 171  150 - 450 10e3/uL Final     % Neutrophils 01/25/2022 62  % Final     % Lymphocytes 01/25/2022 28  % Final     % Monocytes 01/25/2022 8  % Final     % Eosinophils 01/25/2022 2  % Final     % Basophils 01/25/2022 0  % Final     % Immature Granulocytes 01/25/2022 0  % Final     NRBCs per 100 WBC 01/25/2022 0  <1 /100 Final     Absolute Neutrophils 01/25/2022 3.6  1.6 - 8.3 10e3/uL Final     Absolute Lymphocytes 01/25/2022 1.6  0.8 - 5.3 10e3/uL Final     Absolute Monocytes 01/25/2022 0.5  0.0 - 1.3 10e3/uL Final     Absolute Eosinophils 01/25/2022 0.1  0.0 -  0.7 10e3/uL Final     Absolute Basophils 01/25/2022 0.0  0.0 - 0.2 10e3/uL Final     Absolute Immature Granulocytes 01/25/2022 0.0  <=0.4 10e3/uL Final     Absolute NRBCs 01/25/2022 0.0  10e3/uL Final          Return in approximately 1 year, or sooner as needed for follow-up with Dr. Vinson.  - Annual Follow-up / Physical - Medicare Annual Wellness Visit     Clinic : [phone]  Appointment line: [phone]

## 2022-03-02 NOTE — NURSING NOTE
Patient is here for annual medicare wellness visit.       Medication Reconciliation: complete    FOOD SECURITY SCREENING QUESTIONS  Hunger Vital Signs:  Within the past 12 months we worried whether our food would run out before we got money to buy more. Never  Within the past 12 months the food we bought just didn't last and we didn't have money to get more. Never  Nona Neal LPN 3/2/2022 3:59 PM       Advance care directive on file? no  Advance care directive provided to patient? no       Nona Neal LPN

## 2022-03-03 ENCOUNTER — TELEPHONE (OUTPATIENT)
Dept: INTERNAL MEDICINE | Facility: OTHER | Age: 67
End: 2022-03-03
Payer: COMMERCIAL

## 2022-03-03 DIAGNOSIS — D84.821 IMMUNOCOMPROMISED STATE DUE TO DRUG THERAPY (H): ICD-10-CM

## 2022-03-03 DIAGNOSIS — Z94.4 HISTORY OF LIVER TRANSPLANT (H): ICD-10-CM

## 2022-03-03 DIAGNOSIS — Z79.899 IMMUNOCOMPROMISED STATE DUE TO DRUG THERAPY (H): ICD-10-CM

## 2022-03-03 NOTE — TELEPHONE ENCOUNTER
[facility] called with a question regarding the Cyclosporine modified 25 mg prescription. The current prescription states 1 capsule. Please clarify/correct and resend prescription.    Kelsey Newell on 3/3/2022 at 11:25 AM

## 2022-03-04 RX ORDER — CYCLOSPORINE 25 MG/1
75 CAPSULE, LIQUID FILLED ORAL DAILY
Refills: 0 | Status: CANCELLED | OUTPATIENT
Start: 2022-03-04

## 2022-03-04 NOTE — TELEPHONE ENCOUNTER
Script was sent as dispense 1 capsule. Med pending with dispense amount removed to be updated by provider.   Nona Neal LPN .............3/4/2022     10:06 AM        cycloSPORINE modified (GENERIC EQUIVALENT) 25 MG capsule 1 capsule 0 3/2/2022  No   Sig - Route: Take 3 capsules (75 mg) by mouth daily -- Rx from Transplant Clinic - Oral   Sent to pharmacy as: cycloSPORINE Modified 25 MG Oral Capsule (GENERIC EQUIVALENT)   Class: E-Prescribe   Order: [id number]   E-Prescribing Status: Receipt confirmed by pharmacy (3/2/2022  4:26 PM CST)

## 2022-03-04 NOTE — TELEPHONE ENCOUNTER
Called and informed [facility] of Dr. Vinson response and they will call transplant clinic.  Ondina Billy RN on 3/4/2022 at 12:44 PM

## 2022-03-04 NOTE — TELEPHONE ENCOUNTER
His anti-rejection drugs are supposed to be coming from his Transplant Clinic... says right on the Rx.    They need to request refills from his transplant team / doctor.      We need to verify correct medication dose and update medlist.     Ramon Vinson MD

## 2022-04-19 ENCOUNTER — TELEPHONE (OUTPATIENT)
Dept: INTERNAL MEDICINE | Facility: OTHER | Age: 67
End: 2022-04-19
Payer: COMMERCIAL

## 2022-04-19 DIAGNOSIS — D84.821 IMMUNOCOMPROMISED STATE DUE TO DRUG THERAPY (H): ICD-10-CM

## 2022-04-19 DIAGNOSIS — Z94.4 HISTORY OF LIVER TRANSPLANT (H): ICD-10-CM

## 2022-04-19 DIAGNOSIS — Z79.899 IMMUNOCOMPROMISED STATE DUE TO DRUG THERAPY (H): ICD-10-CM

## 2022-04-19 RX ORDER — MYCOPHENOLATE MOFETIL 500 MG/1
500 TABLET ORAL 2 TIMES DAILY
Qty: 1 TABLET | Refills: 0 | COMMUNITY
Start: 2022-04-19

## 2022-04-19 NOTE — TELEPHONE ENCOUNTER
Medication was incorret from [facility], one of his transplant drugs, very important, Mycophenolate 500mg, should read one tablet 2x a day, thank you, please call if questions.    Janina Duncan on 4/19/2022 at 11:05 AM

## 2022-04-19 NOTE — TELEPHONE ENCOUNTER
Call [facility], they stated they will reroute the request to the transplant team at [facility].    Ida Bhatt LPN on 4/19/2022 at 12:33 PM

## 2022-04-19 NOTE — TELEPHONE ENCOUNTER
The Mycophenolate should be filled from his transplant providers at [facility].     Please notify his pharmacy.     Electronically signed by:  Ramon Vinson MD  on April 19, 2022 11:55 AM

## 2022-05-02 ENCOUNTER — LAB (OUTPATIENT)
Dept: LAB | Facility: OTHER | Age: 67
End: 2022-05-02
Payer: MEDICARE

## 2022-05-02 DIAGNOSIS — Z79.899 ENCOUNTER FOR LONG-TERM (CURRENT) USE OF MEDICATIONS: ICD-10-CM

## 2022-05-02 DIAGNOSIS — Z01.89 EXAMINATION FOR, LABORATORY: Primary | ICD-10-CM

## 2022-05-02 DIAGNOSIS — Z94.4 LIVER REPLACED BY TRANSPLANT (H): ICD-10-CM

## 2022-05-02 LAB
ALBUMIN SERPL-MCNC: 3.9 G/DL (ref 3.5–5.7)
ALP SERPL-CCNC: 77 U/L (ref 34–104)
ALT SERPL W P-5'-P-CCNC: 9 U/L (ref 7–52)
ANION GAP SERPL CALCULATED.3IONS-SCNC: 9 MMOL/L (ref 3–14)
AST SERPL W P-5'-P-CCNC: 13 U/L (ref 13–39)
BASOPHILS # BLD AUTO: 0.1 10E3/UL (ref 0–0.2)
BASOPHILS NFR BLD AUTO: 1 %
BILIRUB SERPL-MCNC: 0.9 MG/DL (ref 0.3–1)
BUN SERPL-MCNC: 12 MG/DL (ref 7–25)
CALCIUM SERPL-MCNC: 8.9 MG/DL (ref 8.6–10.3)
CHLORIDE BLD-SCNC: 105 MMOL/L (ref 98–107)
CO2 SERPL-SCNC: 25 MMOL/L (ref 21–31)
CREAT SERPL-MCNC: 0.93 MG/DL (ref 0.7–1.3)
EOSINOPHIL # BLD AUTO: 0.3 10E3/UL (ref 0–0.7)
EOSINOPHIL NFR BLD AUTO: 4 %
ERYTHROCYTE [DISTWIDTH] IN BLOOD BY AUTOMATED COUNT: 13.3 % (ref 10–15)
GFR SERPL CREATININE-BSD FRML MDRD: >90 ML/MIN/1.73M2
GLUCOSE BLD-MCNC: 87 MG/DL (ref 70–105)
HCT VFR BLD AUTO: 44.1 % (ref 40–53)
HGB BLD-MCNC: 15.7 G/DL (ref 13.3–17.7)
IMM GRANULOCYTES # BLD: 0 10E3/UL
IMM GRANULOCYTES NFR BLD: 1 %
LYMPHOCYTES # BLD AUTO: 1.4 10E3/UL (ref 0.8–5.3)
LYMPHOCYTES NFR BLD AUTO: 21 %
MCH RBC QN AUTO: 31.8 PG (ref 26.5–33)
MCHC RBC AUTO-ENTMCNC: 35.6 G/DL (ref 31.5–36.5)
MCV RBC AUTO: 89 FL (ref 78–100)
MONOCYTES # BLD AUTO: 0.5 10E3/UL (ref 0–1.3)
MONOCYTES NFR BLD AUTO: 7 %
NEUTROPHILS # BLD AUTO: 4.4 10E3/UL (ref 1.6–8.3)
NEUTROPHILS NFR BLD AUTO: 66 %
NRBC # BLD AUTO: 0 10E3/UL
NRBC BLD AUTO-RTO: 0 /100
PLATELET # BLD AUTO: 159 10E3/UL (ref 150–450)
POTASSIUM BLD-SCNC: 3.9 MMOL/L (ref 3.5–5.1)
PROT SERPL-MCNC: 6.3 G/DL (ref 6.4–8.9)
RBC # BLD AUTO: 4.94 10E6/UL (ref 4.4–5.9)
SODIUM SERPL-SCNC: 139 MMOL/L (ref 134–144)
WBC # BLD AUTO: 6.7 10E3/UL (ref 4–11)

## 2022-05-02 PROCEDURE — 36415 COLL VENOUS BLD VENIPUNCTURE: CPT | Mod: ZL

## 2022-05-02 PROCEDURE — 82040 ASSAY OF SERUM ALBUMIN: CPT | Mod: ZL

## 2022-05-02 PROCEDURE — 85025 COMPLETE CBC W/AUTO DIFF WBC: CPT | Mod: ZL

## 2022-05-02 PROCEDURE — 80053 COMPREHEN METABOLIC PANEL: CPT | Mod: ZL

## 2022-05-02 PROCEDURE — 99000 SPECIMEN HANDLING OFFICE-LAB: CPT | Mod: GY

## 2022-07-22 DIAGNOSIS — Z79.899 ENCOUNTER FOR LONG-TERM (CURRENT) USE OF MEDICATIONS: ICD-10-CM

## 2022-07-22 DIAGNOSIS — Z94.4 TRANSPLANTED LIVER (H): Primary | ICD-10-CM

## 2022-08-03 ENCOUNTER — LAB (OUTPATIENT)
Dept: LAB | Facility: OTHER | Age: 67
End: 2022-08-03
Payer: MEDICARE

## 2022-08-03 DIAGNOSIS — Z79.899 ENCOUNTER FOR LONG-TERM (CURRENT) USE OF MEDICATIONS: ICD-10-CM

## 2022-08-03 DIAGNOSIS — Z94.4 TRANSPLANTED LIVER (H): ICD-10-CM

## 2022-08-03 LAB
ALBUMIN SERPL-MCNC: 3.8 G/DL (ref 3.5–5.7)
ALP SERPL-CCNC: 84 U/L (ref 34–104)
ALT SERPL W P-5'-P-CCNC: 11 U/L (ref 7–52)
ANION GAP SERPL CALCULATED.3IONS-SCNC: 7 MMOL/L (ref 3–14)
AST SERPL W P-5'-P-CCNC: 16 U/L (ref 13–39)
BASOPHILS # BLD AUTO: 0 10E3/UL (ref 0–0.2)
BASOPHILS NFR BLD AUTO: 1 %
BILIRUB SERPL-MCNC: 0.9 MG/DL (ref 0.3–1)
BUN SERPL-MCNC: 14 MG/DL (ref 7–25)
CALCIUM SERPL-MCNC: 8.9 MG/DL (ref 8.6–10.3)
CHLORIDE BLD-SCNC: 105 MMOL/L (ref 98–107)
CO2 SERPL-SCNC: 25 MMOL/L (ref 21–31)
CREAT SERPL-MCNC: 0.99 MG/DL (ref 0.7–1.3)
EOSINOPHIL # BLD AUTO: 0.2 10E3/UL (ref 0–0.7)
EOSINOPHIL NFR BLD AUTO: 4 %
ERYTHROCYTE [DISTWIDTH] IN BLOOD BY AUTOMATED COUNT: 13.8 % (ref 10–15)
GFR SERPL CREATININE-BSD FRML MDRD: 85 ML/MIN/1.73M2
GLUCOSE BLD-MCNC: 117 MG/DL (ref 70–105)
HCT VFR BLD AUTO: 42.5 % (ref 40–53)
HGB BLD-MCNC: 15.1 G/DL (ref 13.3–17.7)
IMM GRANULOCYTES # BLD: 0 10E3/UL
IMM GRANULOCYTES NFR BLD: 0 %
LYMPHOCYTES # BLD AUTO: 1.4 10E3/UL (ref 0.8–5.3)
LYMPHOCYTES NFR BLD AUTO: 26 %
MCH RBC QN AUTO: 32.2 PG (ref 26.5–33)
MCHC RBC AUTO-ENTMCNC: 35.5 G/DL (ref 31.5–36.5)
MCV RBC AUTO: 91 FL (ref 78–100)
MONOCYTES # BLD AUTO: 0.5 10E3/UL (ref 0–1.3)
MONOCYTES NFR BLD AUTO: 9 %
NEUTROPHILS # BLD AUTO: 3.4 10E3/UL (ref 1.6–8.3)
NEUTROPHILS NFR BLD AUTO: 60 %
NRBC # BLD AUTO: 0 10E3/UL
NRBC BLD AUTO-RTO: 0 /100
PLATELET # BLD AUTO: 160 10E3/UL (ref 150–450)
POTASSIUM BLD-SCNC: 3.8 MMOL/L (ref 3.5–5.1)
PROT SERPL-MCNC: 6.7 G/DL (ref 6.4–8.9)
RBC # BLD AUTO: 4.69 10E6/UL (ref 4.4–5.9)
SODIUM SERPL-SCNC: 137 MMOL/L (ref 134–144)
WBC # BLD AUTO: 5.5 10E3/UL (ref 4–11)

## 2022-08-03 PROCEDURE — 36415 COLL VENOUS BLD VENIPUNCTURE: CPT | Mod: ZL

## 2022-08-03 PROCEDURE — 80053 COMPREHEN METABOLIC PANEL: CPT | Mod: ZL

## 2022-08-03 PROCEDURE — 85025 COMPLETE CBC W/AUTO DIFF WBC: CPT | Mod: ZL

## 2022-08-25 ENCOUNTER — HOSPITAL ENCOUNTER (OUTPATIENT)
Dept: GENERAL RADIOLOGY | Facility: OTHER | Age: 67
Discharge: HOME | End: 2022-08-25
Attending: NURSE PRACTITIONER
Payer: MEDICARE

## 2022-08-25 ENCOUNTER — OFFICE VISIT (OUTPATIENT)
Dept: FAMILY MEDICINE | Facility: OTHER | Age: 67
End: 2022-08-25
Attending: NURSE PRACTITIONER
Payer: MEDICARE

## 2022-08-25 VITALS
DIASTOLIC BLOOD PRESSURE: 78 MMHG | TEMPERATURE: 97.1 F | WEIGHT: 273 LBS | BODY MASS INDEX: 42.13 KG/M2 | OXYGEN SATURATION: 96 % | SYSTOLIC BLOOD PRESSURE: 124 MMHG | HEART RATE: 106 BPM | RESPIRATION RATE: 18 BRPM

## 2022-08-25 DIAGNOSIS — M76.60 PAIN IN ACHILLES TENDON: ICD-10-CM

## 2022-08-25 DIAGNOSIS — M79.672 PAIN OF LEFT HEEL: ICD-10-CM

## 2022-08-25 DIAGNOSIS — M76.62 LEFT ACHILLES TENDINITIS: Primary | ICD-10-CM

## 2022-08-25 PROCEDURE — G0463 HOSPITAL OUTPT CLINIC VISIT: HCPCS

## 2022-08-25 PROCEDURE — G0463 HOSPITAL OUTPT CLINIC VISIT: HCPCS | Mod: 25

## 2022-08-25 PROCEDURE — 73610 X-RAY EXAM OF ANKLE: CPT | Mod: LT

## 2022-08-25 PROCEDURE — 99213 OFFICE O/P EST LOW 20 MIN: CPT | Performed by: NURSE PRACTITIONER

## 2022-08-25 RX ORDER — CALCIUM CARBONATE 500(1250)
200 TABLET,CHEWABLE ORAL
COMMUNITY
Start: 2021-06-22

## 2022-08-25 RX ORDER — NAPROXEN 500 MG/1
500 TABLET ORAL 2 TIMES DAILY WITH MEALS
Qty: 60 TABLET | Refills: 0 | Status: SHIPPED | OUTPATIENT
Start: 2022-08-25 | End: 2022-09-24

## 2022-08-25 ASSESSMENT — PAIN SCALES - GENERAL: PAINLEVEL: WORST PAIN (10)

## 2022-08-25 NOTE — NURSING NOTE
Pt presents to clinic today for left foot swelling and pain. Patient states this started 3 days ago, its a lump on the heel and is very painful and has a hard time walking on it.       FOOD SECURITY SCREENING QUESTIONS:    The next two questions are to help us understand your food security.  If you are feeling you need any assistance in this area, we have resources available to support you today.    Hunger Vital Signs:  Within the past 12 months we worried whether our food would run out before we got money to buy more. Never  Within the past 12 months the food we bought just didn't last and we didn't have money to get more. Never          Medication Reconciliation: complete  Dai Chang, LOLIS,LPN on 8/25/2022 at 9:47 AM

## 2022-08-25 NOTE — PROGRESS NOTES
Assessment & Plan   Problem List Items Addressed This Visit    None     Visit Diagnoses     Left Achilles tendinitis    -  Primary    Relevant Medications    naproxen (NAPROSYN) 500 MG tablet    Pain in Achilles tendon        Relevant Medications    naproxen (NAPROSYN) 500 MG tablet    Other Relevant Orders    XR Ankle Left G/E 3 Views (Completed)    Pain of left heel        Relevant Medications    naproxen (NAPROSYN) 500 MG tablet    Other Relevant Orders    XR Ankle Left G/E 3 Views (Completed)      Discussed with his recent activities with climbing up and down ladders that he likely has aggravated his left Achilles tendon causing tendinitis.  X-ray does show some small calcaneal spurs at the Achilles tendon insertion site.  I recommend symptomatic and conservative treatment including ice, NSAIDs and immobilization for a couple weeks to allow this to heal.  He declines immobilization at this time.  Naproxen was sent to pharmacy to be taken twice daily with food over the next couple weeks, ice frequently.  If his pain is not improving over the next couple weeks, recommend follow-up and reconsideration of immobilization.      Review of the result(s) of each unique test - xray  Ordering of each unique test  Prescription drug management  23 minutes spent on the date of the encounter doing chart review, history and exam, documentation and further activities per the note         No follow-ups on file.    RUPERTO Garcia [facility] is a 67 year old, presenting for the following health issues:  Foot Swelling (Left foot)      History of Present Illness       Reason for visit:  Left foot swelling and pain    He eats 0-1 servings of fruits and vegetables daily.He consumes 0 sweetened beverage(s) daily.He exercises with enough effort to increase his heart rate 30 to 60 minutes per day.  He exercises with enough effort to increase his heart rate 3 or less days per week.    He is taking medications regularly.       Pain History:  When did you first notice your pain? - Acute Pain   Have you seen anyone else for your pain? No  Where in your body do you have pain? left foot pain    He reports he has been working on remodeling an apartment and doing a lot of painting as well as climbing up and down ladders over the past 2 weeks.  About 3 days ago he noted pain into the back of his left heel.  He has not had any injuries that he recalls.  He has been having swelling, pain and increasing difficulties with walking.  He has tried rest and Tylenol without significant improvement.    Review of Systems   See above      Objective    /78 (BP Location: Right arm, Patient Position: Sitting, Cuff Size: Adult Large)   Pulse 106   Temp 97.1  F (36.2  C) (Tympanic)   Resp 18   Wt 123.8 kg (273 lb)   SpO2 96%   BMI 42.13 kg/m    Body mass index is 42.13 kg/m .  Physical Exam   GENERAL: healthy, alert and no distress  MS: He does walk slowly, has a mild limp.  Exquisitely tender over left heel at base of Achilles tendon insertion point.  Achilles tendon is intact.  Increased pain with flexion of left foot.  No bruising or swelling appreciated.  SKIN: no suspicious lesions or rashes  NEURO: Normal strength and tone, mentation intact and speech normal  PSYCH: mentation appears normal, affect normal/bright    Xray - Reviewed and interpreted by me.  No acute fracture or injury to left heel            .  ..

## 2022-12-21 ENCOUNTER — LAB (OUTPATIENT)
Dept: LAB | Facility: OTHER | Age: 67
End: 2022-12-21
Attending: INTERNAL MEDICINE
Payer: MEDICARE

## 2022-12-21 DIAGNOSIS — Z79.899 ENCOUNTER FOR LONG-TERM (CURRENT) USE OF MEDICATIONS: ICD-10-CM

## 2022-12-21 DIAGNOSIS — Z94.4 TRANSPLANTED LIVER (H): ICD-10-CM

## 2022-12-21 LAB
ALBUMIN SERPL BCG-MCNC: 4.1 G/DL (ref 3.5–5.2)
ALP SERPL-CCNC: 89 U/L (ref 40–129)
ALT SERPL W P-5'-P-CCNC: 13 U/L (ref 10–50)
ANION GAP SERPL CALCULATED.3IONS-SCNC: 10 MMOL/L (ref 7–15)
AST SERPL W P-5'-P-CCNC: 19 U/L (ref 10–50)
BASOPHILS # BLD AUTO: 0 10E3/UL (ref 0–0.2)
BASOPHILS NFR BLD AUTO: 1 %
BILIRUB SERPL-MCNC: 1 MG/DL
BUN SERPL-MCNC: 10.6 MG/DL (ref 8–23)
CALCIUM SERPL-MCNC: 9.3 MG/DL (ref 8.8–10.2)
CHLORIDE SERPL-SCNC: 104 MMOL/L (ref 98–107)
CREAT SERPL-MCNC: 0.9 MG/DL (ref 0.67–1.17)
DEPRECATED HCO3 PLAS-SCNC: 26 MMOL/L (ref 22–29)
EOSINOPHIL # BLD AUTO: 0.3 10E3/UL (ref 0–0.7)
EOSINOPHIL NFR BLD AUTO: 5 %
ERYTHROCYTE [DISTWIDTH] IN BLOOD BY AUTOMATED COUNT: 13.3 % (ref 10–15)
GFR SERPL CREATININE-BSD FRML MDRD: >90 ML/MIN/1.73M2
GLUCOSE SERPL-MCNC: 102 MG/DL (ref 70–99)
HCT VFR BLD AUTO: 43.8 % (ref 40–53)
HGB BLD-MCNC: 15.6 G/DL (ref 13.3–17.7)
IMM GRANULOCYTES # BLD: 0 10E3/UL
IMM GRANULOCYTES NFR BLD: 0 %
LYMPHOCYTES # BLD AUTO: 1.6 10E3/UL (ref 0.8–5.3)
LYMPHOCYTES NFR BLD AUTO: 27 %
MCH RBC QN AUTO: 32.2 PG (ref 26.5–33)
MCHC RBC AUTO-ENTMCNC: 35.6 G/DL (ref 31.5–36.5)
MCV RBC AUTO: 90 FL (ref 78–100)
MONOCYTES # BLD AUTO: 0.4 10E3/UL (ref 0–1.3)
MONOCYTES NFR BLD AUTO: 8 %
NEUTROPHILS # BLD AUTO: 3.4 10E3/UL (ref 1.6–8.3)
NEUTROPHILS NFR BLD AUTO: 59 %
NRBC # BLD AUTO: 0 10E3/UL
NRBC BLD AUTO-RTO: 0 /100
PLATELET # BLD AUTO: 160 10E3/UL (ref 150–450)
POTASSIUM SERPL-SCNC: 3.9 MMOL/L (ref 3.4–5.3)
PROT SERPL-MCNC: 6.5 G/DL (ref 6.4–8.3)
RBC # BLD AUTO: 4.85 10E6/UL (ref 4.4–5.9)
SODIUM SERPL-SCNC: 140 MMOL/L (ref 136–145)
WBC # BLD AUTO: 5.7 10E3/UL (ref 4–11)

## 2022-12-21 PROCEDURE — 36415 COLL VENOUS BLD VENIPUNCTURE: CPT | Mod: ZL

## 2022-12-21 PROCEDURE — 85025 COMPLETE CBC W/AUTO DIFF WBC: CPT | Mod: ZL

## 2022-12-21 PROCEDURE — 80053 COMPREHEN METABOLIC PANEL: CPT | Mod: ZL

## 2022-12-29 ENCOUNTER — LAB (OUTPATIENT)
Dept: LAB | Facility: OTHER | Age: 67
End: 2022-12-29
Payer: MEDICARE

## 2022-12-29 DIAGNOSIS — Z01.89 EXAMINATION FOR, LABORATORY: Primary | ICD-10-CM

## 2022-12-29 PROCEDURE — 36415 COLL VENOUS BLD VENIPUNCTURE: CPT | Mod: ZL

## 2022-12-29 PROCEDURE — 99000 SPECIMEN HANDLING OFFICE-LAB: CPT | Mod: GY

## 2023-04-05 RX ORDER — CYCLOSPORINE 25 MG/1
75 CAPSULE, LIQUID FILLED ORAL DAILY
Qty: 270 CAPSULE | Refills: 4 | Status: CANCELLED | OUTPATIENT
Start: 2023-04-05

## 2023-04-05 RX ORDER — MYCOPHENOLATE MOFETIL 500 MG/1
500 TABLET ORAL 2 TIMES DAILY
Qty: 270 TABLET | Refills: 4 | Status: CANCELLED | OUTPATIENT
Start: 2023-04-05

## 2023-04-05 RX ORDER — AMLODIPINE BESYLATE 10 MG/1
10 TABLET ORAL DAILY
COMMUNITY
End: 2023-04-06

## 2023-04-05 NOTE — PROGRESS NOTES
Pre-Visit Planning   Next 5 appointments (look out 90 days)    Apr 06, 2023  3:20 PM  PHYSICAL with Ramon Vinson MD  [facility] ) [address]  [place] [address]  [phone]        Appointment Notes for this encounter:   Annual Medicare Wellness (due on 3/2)    Questionnaires Reviewed/Assigned  per LPN      Contacted patient via phone/MyChart. Are there any additional questions or concerns you'd like to review with your provider during your visit? No  1.) He is interested in getting whichever vaccinations you would recommend that he is due for.     Visit is preventive. Reviewed purpose of preventive visit with patient.    Meds  Home Meds reviewed and updated  Refills pended    Entered patient-preferred pharmacy.     Current Outpatient Medications   Medication     amLODIPine (NORVASC) 10 MG tablet     ASPIRIN PO     calcium carbonate 500 mg, elemental, 1250 (500 Ca) MG tablet chewable     cycloSPORINE modified (GENERIC EQUIVALENT) 25 MG capsule     furosemide (LASIX) 40 MG tablet     lisinopril (ZESTRIL) 10 MG tablet     multivitamin, therapeutic (THERA-VIT) TABS tablet     mycophenolate (GENERIC EQUIVALENT) 500 MG tablet     pramipexole (MIRAPEX) 0.25 MG tablet     amLODIPine (NORVASC) 10 MG tablet     gabapentin (NEURONTIN) 300 MG capsule     No current facility-administered medications for this visit.     MyChart  Patient is not active on [employer]. Encouraged [employer] activation.  does not want it    Questionnaire Review   Advised patient to arrive early in order to complete questionnaires.    Call Summary  Advised patient to call back at clinic if needed.   Eden Hoff RN on 4/5/2023 at 2:39 PM         Left message for patient to return call for PVP. Eden Hoff RN on 4/5/2023 at 12:28 PM

## 2023-04-06 ENCOUNTER — LAB (OUTPATIENT)
Dept: LAB | Facility: OTHER | Age: 68
End: 2023-04-06
Attending: INTERNAL MEDICINE
Payer: COMMERCIAL

## 2023-04-06 ENCOUNTER — OFFICE VISIT (OUTPATIENT)
Dept: INTERNAL MEDICINE | Facility: OTHER | Age: 68
End: 2023-04-06
Attending: INTERNAL MEDICINE
Payer: MEDICARE

## 2023-04-06 VITALS
HEART RATE: 92 BPM | SYSTOLIC BLOOD PRESSURE: 138 MMHG | OXYGEN SATURATION: 97 % | HEIGHT: 69 IN | RESPIRATION RATE: 22 BRPM | DIASTOLIC BLOOD PRESSURE: 74 MMHG | BODY MASS INDEX: 41.29 KG/M2 | TEMPERATURE: 97.9 F | WEIGHT: 278.8 LBS

## 2023-04-06 DIAGNOSIS — Z00.00 ENCOUNTER FOR MEDICARE ANNUAL WELLNESS EXAM: ICD-10-CM

## 2023-04-06 DIAGNOSIS — Z79.899 ENCOUNTER FOR LONG-TERM (CURRENT) USE OF MEDICATIONS: ICD-10-CM

## 2023-04-06 DIAGNOSIS — Z71.85 VACCINE COUNSELING: ICD-10-CM

## 2023-04-06 DIAGNOSIS — Z86.19 HISTORY OF HEPATITIS C: ICD-10-CM

## 2023-04-06 DIAGNOSIS — E78.2 MIXED HYPERLIPIDEMIA: ICD-10-CM

## 2023-04-06 DIAGNOSIS — Z94.4 HISTORY OF LIVER TRANSPLANT (H): ICD-10-CM

## 2023-04-06 DIAGNOSIS — G25.81 RESTLESS LEG SYNDROME: ICD-10-CM

## 2023-04-06 DIAGNOSIS — D84.821 IMMUNOCOMPROMISED STATE DUE TO DRUG THERAPY (H): ICD-10-CM

## 2023-04-06 DIAGNOSIS — R73.9 ELEVATED RANDOM BLOOD GLUCOSE LEVEL: ICD-10-CM

## 2023-04-06 DIAGNOSIS — Z79.899 IMMUNOCOMPROMISED STATE DUE TO DRUG THERAPY (H): ICD-10-CM

## 2023-04-06 DIAGNOSIS — D69.6 THROMBOCYTOPENIA (H): ICD-10-CM

## 2023-04-06 DIAGNOSIS — Z94.4 TRANSPLANTED LIVER (H): ICD-10-CM

## 2023-04-06 DIAGNOSIS — R60.0 BILATERAL LOWER EXTREMITY EDEMA: ICD-10-CM

## 2023-04-06 DIAGNOSIS — J44.9 MILD CHRONIC OBSTRUCTIVE PULMONARY DISEASE (H): Primary | ICD-10-CM

## 2023-04-06 DIAGNOSIS — I10 BENIGN ESSENTIAL HYPERTENSION: ICD-10-CM

## 2023-04-06 DIAGNOSIS — E66.01 MORBID OBESITY (H): ICD-10-CM

## 2023-04-06 DIAGNOSIS — M31.10 THROMBOTIC MICROANGIOPATHY (H): ICD-10-CM

## 2023-04-06 PROBLEM — B18.2 CHRONIC HEPATITIS C WITHOUT HEPATIC COMA (H): Status: ACTIVE | Noted: 2023-04-06

## 2023-04-06 LAB
ALBUMIN SERPL BCG-MCNC: 3.8 G/DL (ref 3.5–5.2)
ALP SERPL-CCNC: 88 U/L (ref 40–129)
ALT SERPL W P-5'-P-CCNC: 13 U/L (ref 10–50)
ANION GAP SERPL CALCULATED.3IONS-SCNC: 11 MMOL/L (ref 7–15)
AST SERPL W P-5'-P-CCNC: 15 U/L (ref 10–50)
BASOPHILS # BLD AUTO: 0 10E3/UL (ref 0–0.2)
BASOPHILS NFR BLD AUTO: 1 %
BILIRUB SERPL-MCNC: 0.7 MG/DL
BUN SERPL-MCNC: 11.7 MG/DL (ref 8–23)
CALCIUM SERPL-MCNC: 9.1 MG/DL (ref 8.8–10.2)
CHLORIDE SERPL-SCNC: 106 MMOL/L (ref 98–107)
CHOLEST SERPL-MCNC: 151 MG/DL
CREAT SERPL-MCNC: 0.9 MG/DL (ref 0.67–1.17)
DEPRECATED HCO3 PLAS-SCNC: 23 MMOL/L (ref 22–29)
EOSINOPHIL # BLD AUTO: 0.2 10E3/UL (ref 0–0.7)
EOSINOPHIL NFR BLD AUTO: 4 %
ERYTHROCYTE [DISTWIDTH] IN BLOOD BY AUTOMATED COUNT: 13.2 % (ref 10–15)
GFR SERPL CREATININE-BSD FRML MDRD: >90 ML/MIN/1.73M2
GLUCOSE SERPL-MCNC: 147 MG/DL (ref 70–99)
HBA1C MFR BLD: 5.3 % (ref 4–6.2)
HCT VFR BLD AUTO: 42.4 % (ref 40–53)
HDLC SERPL-MCNC: 41 MG/DL
HGB BLD-MCNC: 15.4 G/DL (ref 13.3–17.7)
HOLD SPECIMEN: NORMAL
IMM GRANULOCYTES # BLD: 0 10E3/UL
IMM GRANULOCYTES NFR BLD: 0 %
LDLC SERPL CALC-MCNC: 79 MG/DL
LYMPHOCYTES # BLD AUTO: 1.3 10E3/UL (ref 0.8–5.3)
LYMPHOCYTES NFR BLD AUTO: 20 %
MCH RBC QN AUTO: 33 PG (ref 26.5–33)
MCHC RBC AUTO-ENTMCNC: 36.3 G/DL (ref 31.5–36.5)
MCV RBC AUTO: 91 FL (ref 78–100)
MONOCYTES # BLD AUTO: 0.4 10E3/UL (ref 0–1.3)
MONOCYTES NFR BLD AUTO: 7 %
NEUTROPHILS # BLD AUTO: 4.4 10E3/UL (ref 1.6–8.3)
NEUTROPHILS NFR BLD AUTO: 68 %
NONHDLC SERPL-MCNC: 110 MG/DL
NRBC # BLD AUTO: 0 10E3/UL
NRBC BLD AUTO-RTO: 0 /100
PLATELET # BLD AUTO: 155 10E3/UL (ref 150–450)
POTASSIUM SERPL-SCNC: 3.7 MMOL/L (ref 3.4–5.3)
PROT SERPL-MCNC: 6.6 G/DL (ref 6.4–8.3)
RBC # BLD AUTO: 4.67 10E6/UL (ref 4.4–5.9)
SODIUM SERPL-SCNC: 140 MMOL/L (ref 136–145)
TRIGL SERPL-MCNC: 157 MG/DL
TSH SERPL DL<=0.005 MIU/L-ACNC: 1.78 UIU/ML (ref 0.3–4.2)
WBC # BLD AUTO: 6.4 10E3/UL (ref 4–11)

## 2023-04-06 PROCEDURE — 36415 COLL VENOUS BLD VENIPUNCTURE: CPT | Mod: ZL

## 2023-04-06 PROCEDURE — 84443 ASSAY THYROID STIM HORMONE: CPT | Mod: ZL

## 2023-04-06 PROCEDURE — 83036 HEMOGLOBIN GLYCOSYLATED A1C: CPT | Mod: ZL

## 2023-04-06 PROCEDURE — 99214 OFFICE O/P EST MOD 30 MIN: CPT | Mod: 25 | Performed by: INTERNAL MEDICINE

## 2023-04-06 PROCEDURE — G0439 PPPS, SUBSEQ VISIT: HCPCS | Performed by: INTERNAL MEDICINE

## 2023-04-06 PROCEDURE — 80061 LIPID PANEL: CPT | Mod: ZL

## 2023-04-06 PROCEDURE — G0463 HOSPITAL OUTPT CLINIC VISIT: HCPCS

## 2023-04-06 PROCEDURE — 80053 COMPREHEN METABOLIC PANEL: CPT | Mod: ZL

## 2023-04-06 PROCEDURE — 85025 COMPLETE CBC W/AUTO DIFF WBC: CPT | Mod: ZL

## 2023-04-06 RX ORDER — PRAMIPEXOLE DIHYDROCHLORIDE 0.25 MG/1
0.5 TABLET ORAL AT BEDTIME
Qty: 180 TABLET | Refills: 4 | Status: SHIPPED | OUTPATIENT
Start: 2023-04-06 | End: 2024-04-17

## 2023-04-06 RX ORDER — AMLODIPINE BESYLATE 10 MG/1
1 TABLET ORAL DAILY
COMMUNITY
Start: 2022-03-02 | End: 2023-04-06

## 2023-04-06 RX ORDER — LISINOPRIL 10 MG/1
10 TABLET ORAL DAILY
Qty: 90 TABLET | Refills: 4 | Status: SHIPPED | OUTPATIENT
Start: 2023-04-06 | End: 2024-02-08

## 2023-04-06 RX ORDER — AMLODIPINE BESYLATE 10 MG/1
10 TABLET ORAL DAILY
Qty: 90 TABLET | Refills: 4 | Status: SHIPPED | OUTPATIENT
Start: 2023-04-06 | End: 2024-02-08

## 2023-04-06 RX ORDER — FUROSEMIDE 40 MG
40 TABLET ORAL 2 TIMES DAILY
Qty: 180 TABLET | Refills: 4 | Status: SHIPPED | OUTPATIENT
Start: 2023-04-06 | End: 2024-02-08

## 2023-04-06 ASSESSMENT — ENCOUNTER SYMPTOMS
NERVOUS/ANXIOUS: 0
HEADACHES: 0
PARESTHESIAS: 0
SHORTNESS OF BREATH: 0
HEARTBURN: 0
EYE PAIN: 0
FREQUENCY: 0
ARTHRALGIAS: 0
SORE THROAT: 0
JOINT SWELLING: 0
HEMATOCHEZIA: 0
SHORTNESS OF BREATH: 1
UNEXPECTED WEIGHT CHANGE: 1
BRUISES/BLEEDS EASILY: 1
FEVER: 0
WEAKNESS: 0
NAUSEA: 0
HEMATURIA: 0
CONSTIPATION: 0
DIZZINESS: 0
DIARRHEA: 0
PALPITATIONS: 0
COUGH: 0
ABDOMINAL PAIN: 0
CHILLS: 0
MYALGIAS: 0
DYSURIA: 0

## 2023-04-06 ASSESSMENT — PAIN SCALES - GENERAL: PAINLEVEL: NO PAIN (0)

## 2023-04-06 ASSESSMENT — ACTIVITIES OF DAILY LIVING (ADL): CURRENT_FUNCTION: NO ASSISTANCE NEEDED

## 2023-04-06 NOTE — NURSING NOTE
"Chief Complaint   Patient presents with     Medicare Wellness Visit         Initial /74 (BP Location: Right arm, Patient Position: Sitting, Cuff Size: Adult Large)   Pulse 92   Temp 97.9  F (36.6  C) (Temporal)   Resp 22   Ht 1.74 m (5' 8.5\")   Wt 126.5 kg (278 lb 12.8 oz)   SpO2 97%   BMI 41.77 kg/m   Estimated body mass index is 41.77 kg/m  as calculated from the following:    Height as of this encounter: 1.74 m (5' 8.5\").    Weight as of this encounter: 126.5 kg (278 lb 12.8 oz).       FOOD SECURITY SCREENING QUESTIONS:    The next two questions are to help us understand your food security.  If you are feeling you need any assistance in this area, we have resources available to support you today.    Hunger Vital Signs:  Within the past 12 months we worried whether our food would run out before we got money to buy more. Never  Within the past 12 months the food we bought just didn't last and we didn't have money to get more. Never    Advance Care Directive on file? no      Medication reconciliation complete.      Deric Franklin,on 4/6/2023 at 3:12 PM        "

## 2023-04-06 NOTE — PATIENT INSTRUCTIONS
Blood pressure is well controlled.     Medications refilled.   Last labs were stable.     Aspects of Diabetes:   Recent Labs   Lab Test 04/06/23  1438 12/21/22  0708 08/03/22  0743 05/04/21  0710 02/19/21  0715   A1C  --   --   --   --  5.1   LDL  --   --   --   --  108*   HDL  --   --   --   --  32   TRIG  --   --   --   --  148   ALT 13 13 11   < >  --    CR 0.90 0.90 0.99   < >  --    GFRESTIMATED >90 >90 85   < >  --    POTASSIUM 3.7 3.9 3.8   < >  --    WBC 6.4 5.7 5.5   < >  --    HGB 15.4 15.6 15.1   < >  --     160 160   < >  --    ALBUMIN 3.8 4.1 3.8   < >  --     < > = values in this interval not displayed.      Hemoglobin A1c  Goal range is under 8%. Best is 6.5 to 7   Blood Pressure 138/74 Goal to keep less than 140/90   Tobacco  reports that he quit smoking about 5 years ago. His smoking use included cigars and cigarettes. He has a 45.00 pack-year smoking history. He has never used smokeless tobacco. Goal to abstain from tobacco   Aspirin or Plavix Anti-platelet therapy Aspirin or Plavix reduces risk of heart disease and stroke  -- sometimes used with other blood thinners, depending on bleeding risk and risk factors.    ACE/ARB Specific blood pressure meds These medications can reduce risk of kidney disease   Cholesterol Statins (Lipitor, Crestor, vs others) Statins reduce risk of heart disease and stroke   Eye Exam -- Do Yearly -- Annual diabetic eye exam   Healthy weight Wt Readings from Last 4 Encounters:   04/06/23 126.5 kg (278 lb 12.8 oz)   08/25/22 123.8 kg (273 lb)   03/02/22 126.7 kg (279 lb 6.4 oz)   02/12/21 126.5 kg (278 lb 12.8 oz)      Body mass index is 41.77 kg/m .  Goal BMI under 30, best is under 25.      -- Trying to exercise daily (goal at least 20 min/day) with moderate aerobic activity   -- Eat healthy (resources from ADA at http://www.diabetes.org/)   -- Taking good care of my feet. Consider seeing the Podiatrist   -- Check blood sugars as directed, record in log book and  bring to every appointment    Insurance companies are grading you and I on your blood sugar control -- Goal is to get your A1c down to 7.9% or lower and NO Smoking!  -- Medicare and most insurance companies, will only cover Hemoglobin A1c labs to be rechecked every 91+ days.        Patient Education   Personalized Prevention Plan  You are due for the preventive services outlined below.  Your care team is available to assist you in scheduling these services.  If you have already completed any of these items, please share that information with your care team to update in your medical record.  Health Maintenance Due   Topic Date Due    Breathing Capacity Test  Never done    COPD Action Plan  Never done    LUNG CANCER SCREENING  Never done    Zoster (Shingles) Vaccine (2 of 2) 08/22/2019    COVID-19 Vaccine (3 - Moderna risk series) 04/29/2021    Flu Vaccine (1) 09/01/2022    Diptheria Tetanus Pertussis (DTAP/TDAP/TD) Vaccine (2 - Td or Tdap) 12/12/2022    Annual Wellness Visit  03/02/2023     Preventive Health Recommendations  See your health care provider every year to  Review health changes.   Discuss preventive care.    Review your medicines if your doctor has prescribed any.  Talk with your health care provider about whether you should have a test to screen for prostate cancer (PSA).  Every 3 years, have a diabetes test (fasting glucose). If you are at risk for diabetes, you should have this test more often.  Every 5 years, have a cholesterol test. Have this test more often if you are at risk for high cholesterol or heart disease.   Every 10 years, have a colonoscopy. Or, have a yearly FIT test (stool test). These exams will check for colon cancer.  Talk to with your health care provider about screening for Abdominal Aortic Aneurysm if you have a family history of AAA or have a history of smoking.    Shots:   Get a flu shot each year.   Get a tetanus shot every 10 years.   Talk to your doctor about your pneumonia  vaccines. There are now two you should receive - Pneumovax (PPSV 23) and Prevnar (PCV 13).  Talk to your pharmacist about a shingles vaccine.   Talk to your doctor about the hepatitis B vaccine.    Nutrition:   Eat at least 5 servings of fruits and vegetables each day.   Eat whole-grain bread, whole-wheat pasta and brown rice instead of white grains and rice.   Get adequate Calcium and Vitamin D.     Lifestyle  Exercise for at least 150 minutes a week (30 minutes a day, 5 days a week). This will help you control your weight and prevent disease.   Limit alcohol to one drink per day.   No smoking.   Wear sunscreen to prevent skin cancer.   See your dentist every six months for an exam and cleaning.   See your eye doctor every 1 to 2 years to screen for conditions such as glaucoma, macular degeneration and cataracts.    Personalized Prevention Plan  You are due for the preventive services outlined below.  Your care team is available to assist you in scheduling these services.  If you have already completed any of these items, please share that information with your care team to update in your medical record.  Health Maintenance   Topic Date Due    SPIROMETRY  Never done    COPD ACTION PLAN  Never done    LUNG CANCER SCREENING  Never done    ZOSTER IMMUNIZATION (2 of 2) 08/22/2019    COVID-19 Vaccine (3 - Moderna risk series) 04/29/2021    INFLUENZA VACCINE (1) 09/01/2022    DTAP/TDAP/TD IMMUNIZATION (2 - Td or Tdap) 12/12/2022    MEDICARE ANNUAL WELLNESS VISIT  03/02/2023    FALL RISK ASSESSMENT  04/06/2024    Pneumococcal Vaccine: 65+ Years (4 - PPSV23 if available, else PCV20) 06/27/2024    LIPID  02/19/2026    ADVANCE CARE PLANNING  04/06/2028    COLORECTAL CANCER SCREENING  09/14/2032    HEPATITIS C SCREENING  Completed    PHQ-2 (once per calendar year)  Completed    AORTIC ANEURYSM SCREENING (SYSTEM ASSIGNED)  Completed    HIV SCREENING  Addressed    IPV IMMUNIZATION  Aged Out    MENINGITIS IMMUNIZATION  Aged Out

## 2023-04-06 NOTE — PROGRESS NOTES
Assessment & Plan     ICD-10-CM    1. Mild chronic obstructive pulmonary disease (H)  J44.9       2. Benign essential hypertension  I10 amLODIPine (NORVASC) 10 MG tablet     furosemide (LASIX) 40 MG tablet     lisinopril (ZESTRIL) 10 MG tablet     UA reflex to Microscopic     TSH with free T4 reflex     Albumin Random Urine Quantitative with Creat Ratio     TSH Reflex GH      3. Bilateral lower extremity edema  R60.0 furosemide (LASIX) 40 MG tablet      4. Restless leg syndrome  G25.81 pramipexole (MIRAPEX) 0.25 MG tablet      5. Elevated random blood glucose level  R73.09 Hemoglobin A1c     Hemoglobin A1c      6. Mixed hyperlipidemia  E78.2 Lipid Profile     Lipid Panel      7. Immunocompromised state due to drug therapy (H)  D84.821     Z79.899       8. History of liver transplant (H)  Z94.4       9. Thrombotic microangiopathy (H)  M31.10       10. Morbid obesity (H)  E66.01       11. Thrombocytopenia (H)  D69.6       12. History of hepatitis C  Z86.19       13. Vaccine counseling  Z71.85       14. Encounter for Medicare annual wellness exam  Z00.00       Patient presents for Medicare annual wellness visit as well as follow-up multiple issues.    Mild COPD, reports breathing is currently stable.  Declines need for inhalers or nebulizers.    Lower extremity edema, ongoing, continues with Lasix dosing.  Needs refills.    Restless leg syndrome, ongoing but significantly improved with Mirapex.  Takes at bedtime.  Needs refills.    Elevated random glucose, check hemoglobin A1c.  Hemoglobin A1c returned within normal limits today.    Chronic immunosuppression due to history of liver transplant.  Ongoing.  Continues to follow with his liver transplant clinic on a regular basis.    Thrombocytopenia, history of hepatitis C.  Has history of thrombotic microangiopathy in the past.  Denies excessive bleeding at this time.  Does take aspirin daily.    Obesity, Ongoing. Discussed need for reduced oral caloric intake, weight  "loss, regular exercise and reduce carbohydrate/sugar intake.    Hypertension. Ongoing. Blood pressure is currently well controlled.  Medication side effects: None. Denies syncope or presyncope.  No changes for now. Continue -amlodipine, Lasix, lisinopril.   Medication list reviewed/updated. Refills completed as needed.      Mixed hyperlipidemia.  Ongoing. LDL is at goal: Yes. Triglycerides are at goal: No.  Hopefully lifestyle modifications will improve cholesterol levels, otherwise we will need to consider additional medication dose changes or medication changes. --Currently diet controlled.  Consider statin therapy or fibrate therapy.  Recent Labs   Lab Test 04/06/23  1438 02/19/21  0715   CHOL 151 170   HDL 41 32   LDL 79 108*   TRIG 157* 148     Vaccine counseling completed.  Encouraged annual vaccinations.    Return in about 6 months (around 10/6/2023) for Diabetes Follow-up, + Get Diabetic labs prior to clinic appointment.    Ramon Vinson MD  [facility]     SUBJECTIVE:   Philipp is a 67 year old who presents for Preventive Visit.      4/6/2023     3:05 PM   Additional Questions   Roomed by Deric Mcclellan LPN   Accompanied by n/a   Patient has been advised of split billing requirements and indicates understanding: Yes  Are you in the first 12 months of your Medicare coverage?  No    Healthy Habits:     In general, how would you rate your overall health?  Good    Frequency of exercise:  2-3 days/week    Duration of exercise:  15-30 minutes    Do you usually eat at least 4 servings of fruit and vegetables a day, include whole grains    & fiber and avoid regularly eating high fat or \"junk\" foods?  Yes    Taking medications regularly:  Yes    Barriers to taking medications:  Not applicable    Medication side effects:  None    Ability to successfully perform activities of daily living:  No assistance needed    Home Safety:  No safety concerns identified    Hearing Impairment:  No hearing concerns   "  In the past 6 months, have you been bothered by leaking of urine?  No    In general, how would you rate your overall mental or emotional health?  Good      PHQ-2 Total Score: 0    Additional concerns today:  No    Have you ever done Advance Care Planning? (For example, a Health Directive, POLST, or a discussion with a medical provider or your loved ones about your wishes): No, advance care planning information given to patient to review.  Patient declined advance care planning discussion at this time.     Fall risk  Fallen 2 or more times in the past year?: No  Any fall with injury in the past year?: No    Cognitive Screening   1) Repeat 3 items (Leader, Season, Table)    2) Clock draw: ABNORMAL minute hand wrong  3) 3 item recall: Recalls 2 objects   Results: NORMAL clock, 1-2 items recalled: COGNITIVE IMPAIRMENT LESS LIKELY    Mini-CogTM Copyright S [employer]. Licensed by the author for use in [facility]; reprinted with permission (christy@[facility]). All rights reserved.      Do you have sleep apnea, excessive snoring or daytime drowsiness?: no    Reviewed and updated as needed this visit by clinical staff   Tobacco  Allergies  Meds  Problems  Med Hx  Surg Hx  Fam Hx  Soc   Hx        Reviewed and updated as needed this visit by Provider   Tobacco  Allergies  Meds  Problems  Med Hx  Surg Hx  Fam Hx         Social History     Tobacco Use     Smoking status: Former     Packs/day: 1.00     Years: 45.00     Pack years: 45.00     Types: Cigars, Cigarettes     Quit date:      Years since quittin.2     Smokeless tobacco: Never   Vaping Use     Vaping status: Never Used   Substance Use Topics     Alcohol use: No     Alcohol/week: 0.0 standard drinks of alcohol         2023     2:54 PM   Alcohol Use   Prescreen: >3 drinks/day or >7 drinks/week? No     Do you have a current opioid prescription? No  Do you use any other controlled substances or medications that are not prescribed by a  provider? None    Current providers sharing in care for this patient include:   Patient Care Team:  Ramon Vinson MD as PCP - General (Internal Medicine)  Ramon Vinson MD as Assigned PCP    The following health maintenance items are reviewed in Epic and correct as of today:  Health Maintenance   Topic Date Due     SPIROMETRY  Never done     COPD ACTION PLAN  Never done     LUNG CANCER SCREENING  Never done     ZOSTER IMMUNIZATION (2 of 2) 08/22/2019     COVID-19 Vaccine (3 - Moderna risk series) 04/29/2021     INFLUENZA VACCINE (1) 09/01/2022     DTAP/TDAP/TD IMMUNIZATION (2 - Td or Tdap) 12/12/2022     MEDICARE ANNUAL WELLNESS VISIT  04/06/2024     FALL RISK ASSESSMENT  04/06/2024     Pneumococcal Vaccine: 65+ Years (4 - PPSV23 if available, else PCV20) 06/27/2024     LIPID  04/06/2028     ADVANCE CARE PLANNING  04/06/2028     COLORECTAL CANCER SCREENING  09/14/2032     HEPATITIS C SCREENING  Completed     PHQ-2 (once per calendar year)  Completed     AORTIC ANEURYSM SCREENING (SYSTEM ASSIGNED)  Completed     HIV SCREENING  Addressed     IPV IMMUNIZATION  Aged Out     MENINGITIS IMMUNIZATION  Aged Out     Review of Systems   Constitutional: Positive for unexpected weight change (+ Weight gain). Negative for chills and fever.   HENT: Negative for congestion, ear pain, hearing loss and sore throat.    Eyes: Negative for pain and visual disturbance.   Respiratory: Positive for shortness of breath. Negative for cough.    Cardiovascular: Positive for peripheral edema. Negative for chest pain and palpitations.   Gastrointestinal: Negative for abdominal pain, constipation, diarrhea, heartburn, hematochezia and nausea.   Genitourinary: Negative for dysuria, frequency, genital sores, hematuria, impotence, penile discharge and urgency.   Musculoskeletal: Negative for arthralgias, joint swelling and myalgias.   Skin: Negative for rash.   Neurological: Negative for dizziness, weakness, headaches and paresthesias.  "  Hematological: Bruises/bleeds easily.   Psychiatric/Behavioral: Negative for mood changes. The patient is not nervous/anxious.        OBJECTIVE:   /74 (BP Location: Right arm, Patient Position: Sitting, Cuff Size: Adult Large)   Pulse 92   Temp 97.9  F (36.6  C) (Temporal)   Resp 22   Ht 1.74 m (5' 8.5\")   Wt 126.5 kg (278 lb 12.8 oz)   SpO2 97%   BMI 41.77 kg/m   Estimated body mass index is 41.77 kg/m  as calculated from the following:    Height as of this encounter: 1.74 m (5' 8.5\").    Weight as of this encounter: 126.5 kg (278 lb 12.8 oz).  Physical Exam  Constitutional:       General: He is not in acute distress.     Appearance: He is well-developed. He is obese. He is not diaphoretic.   HENT:      Head: Normocephalic and atraumatic.   Eyes:      General: No scleral icterus.     Conjunctiva/sclera: Conjunctivae normal.   Neck:      Vascular: No carotid bruit.   Cardiovascular:      Rate and Rhythm: Normal rate and regular rhythm.      Pulses: Normal pulses.   Pulmonary:      Effort: Pulmonary effort is normal.      Breath sounds: Normal breath sounds.   Abdominal:      General: There is distension.      Palpations: Abdomen is soft.      Tenderness: There is no abdominal tenderness. There is no right CVA tenderness, left CVA tenderness, guarding or rebound.   Musculoskeletal:         General: No deformity.      Cervical back: Neck supple.      Right lower le+ Pitting Edema present.      Left lower le+ Pitting Edema present.   Lymphadenopathy:      Cervical: No cervical adenopathy.   Skin:     General: Skin is warm and dry.      Findings: No rash.   Neurological:      Mental Status: He is alert. Mental status is at baseline.   Psychiatric:         Mood and Affect: Mood normal.         Behavior: Behavior normal.       Diagnostic Test Results:  Labs reviewed in [facility]  Recent Labs   Lab Test 23  1438 22  0708 22  0743 21  0710 21  0715   A1C 5.3  --   --   --  5.1 " "  LDL 79  --   --   --  108*   HDL 41  --   --   --  32   TRIG 157*  --   --   --  148   ALT 13 13 11   < >  --    CR 0.90 0.90 0.99   < >  --    GFRESTIMATED >90 >90 85   < >  --    POTASSIUM 3.7 3.9 3.8   < >  --    TSH 1.78  --   --   --   --    WBC 6.4 5.7 5.5   < >  --    HGB 15.4 15.6 15.1   < >  --     160 160   < >  --    ALBUMIN 3.8 4.1 3.8   < >  --     < > = values in this interval not displayed.     Hemoglobin A1c is within normal limits.  LDL is at goal, triglycerides are high and not at goal.  ALT normal.  Creatinine is at baseline.  Potassium normal.  ALT normal.  CBC normal.  TSH normal.  Albumin normal.    Patient has been advised of split billing requirements and indicates understanding: Yes      COUNSELING:  Reviewed preventive health counseling, as reflected in patient instructions  Special attention given to:       Consider AAA screening for ages 65-75 and smoking history       Regular exercise       Healthy diet/nutrition       Vision screening       Hearing screening       Dental care       Bladder control       Fall risk prevention       Immunizations    BMI:   Estimated body mass index is 41.77 kg/m  as calculated from the following:    Height as of this encounter: 1.74 m (5' 8.5\").    Weight as of this encounter: 126.5 kg (278 lb 12.8 oz).   Weight management plan: Discussed healthy diet and exercise guidelines      He reports that he quit smoking about 5 years ago. His smoking use included cigars and cigarettes. He has a 45.00 pack-year smoking history. He has never used smokeless tobacco.      Appropriate preventive services were discussed with this patient, including applicable screening as appropriate for cardiovascular disease, diabetes, osteopenia/osteoporosis, and glaucoma.  As appropriate for age/gender, discussed screening for colorectal cancer, prostate cancer, breast cancer, and cervical cancer. Checklist reviewing preventive services available has been given to the " patient.    Reviewed patients plan of care and provided an AVS. The Complex Care Plan (for patients with higher acuity and needing more deliberate coordination of services) for Philipp meets the Care Plan requirement. This Care Plan has been established and reviewed with the Patient.          Ramon Vinson MD  [facility]    Identified Health Risks:    I have reviewed Opioid Use Disorder and Substance Use Disorder risk factors and made any needed referrals.

## 2023-08-22 ENCOUNTER — APPOINTMENT (OUTPATIENT)
Dept: LAB | Facility: OTHER | Age: 68
End: 2023-08-22
Payer: COMMERCIAL

## 2023-08-24 ENCOUNTER — DOCUMENTATION ONLY (OUTPATIENT)
Dept: LAB | Facility: OTHER | Age: 68
End: 2023-08-24

## 2023-08-24 NOTE — PROGRESS NOTES
Laboratory received a fax from [facility] for blood specimen collection but the fax didn't include a phone number, fax number, provider, or laboratory orders to determine which test was required. Lab is unable to enter any future, outpatient laboratory orders until an appropriate faxed order is received.

## 2023-08-29 ENCOUNTER — LAB (OUTPATIENT)
Dept: LAB | Facility: OTHER | Age: 68
End: 2023-08-29
Attending: INTERNAL MEDICINE
Payer: MEDICARE

## 2023-08-29 DIAGNOSIS — Z79.899 ENCOUNTER FOR LONG-TERM (CURRENT) USE OF MEDICATIONS: ICD-10-CM

## 2023-08-29 DIAGNOSIS — Z94.4 TRANSPLANTED LIVER (H): ICD-10-CM

## 2023-08-29 LAB
ALBUMIN SERPL BCG-MCNC: 4 G/DL (ref 3.5–5.2)
ALP SERPL-CCNC: 91 U/L (ref 40–129)
ALT SERPL W P-5'-P-CCNC: 14 U/L (ref 0–70)
ANION GAP SERPL CALCULATED.3IONS-SCNC: 9 MMOL/L (ref 7–15)
AST SERPL W P-5'-P-CCNC: 18 U/L (ref 0–45)
BASOPHILS # BLD AUTO: 0 10E3/UL (ref 0–0.2)
BASOPHILS NFR BLD AUTO: 1 %
BILIRUB DIRECT SERPL-MCNC: <0.2 MG/DL (ref 0–0.3)
BILIRUB SERPL-MCNC: 0.8 MG/DL
BUN SERPL-MCNC: 15.2 MG/DL (ref 8–23)
CALCIUM SERPL-MCNC: 9.7 MG/DL (ref 8.8–10.2)
CHLORIDE SERPL-SCNC: 105 MMOL/L (ref 98–107)
CREAT SERPL-MCNC: 0.95 MG/DL (ref 0.67–1.17)
DEPRECATED HCO3 PLAS-SCNC: 26 MMOL/L (ref 22–29)
EOSINOPHIL # BLD AUTO: 0.2 10E3/UL (ref 0–0.7)
EOSINOPHIL NFR BLD AUTO: 4 %
ERYTHROCYTE [DISTWIDTH] IN BLOOD BY AUTOMATED COUNT: 13.2 % (ref 10–15)
GFR SERPL CREATININE-BSD FRML MDRD: 88 ML/MIN/1.73M2
GLUCOSE SERPL-MCNC: 113 MG/DL (ref 70–99)
HCT VFR BLD AUTO: 44.6 % (ref 40–53)
HGB BLD-MCNC: 15.8 G/DL (ref 13.3–17.7)
IMM GRANULOCYTES # BLD: 0 10E3/UL
IMM GRANULOCYTES NFR BLD: 0 %
LYMPHOCYTES # BLD AUTO: 1.5 10E3/UL (ref 0.8–5.3)
LYMPHOCYTES NFR BLD AUTO: 25 %
MCH RBC QN AUTO: 32.2 PG (ref 26.5–33)
MCHC RBC AUTO-ENTMCNC: 35.4 G/DL (ref 31.5–36.5)
MCV RBC AUTO: 91 FL (ref 78–100)
MONOCYTES # BLD AUTO: 0.4 10E3/UL (ref 0–1.3)
MONOCYTES NFR BLD AUTO: 7 %
NEUTROPHILS # BLD AUTO: 3.8 10E3/UL (ref 1.6–8.3)
NEUTROPHILS NFR BLD AUTO: 63 %
NRBC # BLD AUTO: 0 10E3/UL
NRBC BLD AUTO-RTO: 0 /100
PLATELET # BLD AUTO: 162 10E3/UL (ref 150–450)
POTASSIUM SERPL-SCNC: 4.3 MMOL/L (ref 3.4–5.3)
PROT SERPL-MCNC: 6.9 G/DL (ref 6.4–8.3)
RBC # BLD AUTO: 4.9 10E6/UL (ref 4.4–5.9)
SODIUM SERPL-SCNC: 140 MMOL/L (ref 136–145)
WBC # BLD AUTO: 6 10E3/UL (ref 4–11)

## 2023-08-29 PROCEDURE — 80053 COMPREHEN METABOLIC PANEL: CPT | Mod: ZL

## 2023-08-29 PROCEDURE — 82248 BILIRUBIN DIRECT: CPT | Mod: ZL

## 2023-08-29 PROCEDURE — 36415 COLL VENOUS BLD VENIPUNCTURE: CPT | Mod: ZL

## 2023-08-29 PROCEDURE — 85025 COMPLETE CBC W/AUTO DIFF WBC: CPT | Mod: ZL

## 2023-08-29 PROCEDURE — 99000 SPECIMEN HANDLING OFFICE-LAB: CPT | Mod: GY

## 2023-11-13 ENCOUNTER — LAB (OUTPATIENT)
Dept: LAB | Facility: OTHER | Age: 68
End: 2023-11-13
Payer: MEDICARE

## 2023-11-13 DIAGNOSIS — Z79.899 ENCOUNTER FOR LONG-TERM (CURRENT) USE OF MEDICATIONS: ICD-10-CM

## 2023-11-13 DIAGNOSIS — Z94.4 TRANSPLANTED LIVER (H): ICD-10-CM

## 2023-11-13 LAB
ALBUMIN SERPL BCG-MCNC: 4.2 G/DL (ref 3.5–5.2)
ALP SERPL-CCNC: 93 U/L (ref 40–129)
ALT SERPL W P-5'-P-CCNC: 13 U/L (ref 0–70)
ANION GAP SERPL CALCULATED.3IONS-SCNC: 11 MMOL/L (ref 7–15)
AST SERPL W P-5'-P-CCNC: 18 U/L (ref 0–45)
BASOPHILS # BLD AUTO: 0 10E3/UL (ref 0–0.2)
BASOPHILS NFR BLD AUTO: 0 %
BILIRUB DIRECT SERPL-MCNC: 0.25 MG/DL (ref 0–0.3)
BILIRUB SERPL-MCNC: 1.1 MG/DL
BUN SERPL-MCNC: 9.6 MG/DL (ref 8–23)
CALCIUM SERPL-MCNC: 9.4 MG/DL (ref 8.8–10.2)
CHLORIDE SERPL-SCNC: 103 MMOL/L (ref 98–107)
CREAT SERPL-MCNC: 0.92 MG/DL (ref 0.67–1.17)
DEPRECATED HCO3 PLAS-SCNC: 27 MMOL/L (ref 22–29)
EGFRCR SERPLBLD CKD-EPI 2021: >90 ML/MIN/1.73M2
EOSINOPHIL # BLD AUTO: 0.2 10E3/UL (ref 0–0.7)
EOSINOPHIL NFR BLD AUTO: 4 %
ERYTHROCYTE [DISTWIDTH] IN BLOOD BY AUTOMATED COUNT: 13.9 % (ref 10–15)
GLUCOSE SERPL-MCNC: 104 MG/DL (ref 70–99)
HCT VFR BLD AUTO: 46.7 % (ref 40–53)
HGB BLD-MCNC: 16.1 G/DL (ref 13.3–17.7)
IMM GRANULOCYTES # BLD: 0 10E3/UL
IMM GRANULOCYTES NFR BLD: 0 %
LYMPHOCYTES # BLD AUTO: 1.5 10E3/UL (ref 0.8–5.3)
LYMPHOCYTES NFR BLD AUTO: 27 %
MCH RBC QN AUTO: 31.8 PG (ref 26.5–33)
MCHC RBC AUTO-ENTMCNC: 34.5 G/DL (ref 31.5–36.5)
MCV RBC AUTO: 92 FL (ref 78–100)
MONOCYTES # BLD AUTO: 0.5 10E3/UL (ref 0–1.3)
MONOCYTES NFR BLD AUTO: 8 %
NEUTROPHILS # BLD AUTO: 3.4 10E3/UL (ref 1.6–8.3)
NEUTROPHILS NFR BLD AUTO: 61 %
NRBC # BLD AUTO: 0 10E3/UL
NRBC BLD AUTO-RTO: 0 /100
PLATELET # BLD AUTO: 174 10E3/UL (ref 150–450)
POTASSIUM SERPL-SCNC: 4.3 MMOL/L (ref 3.4–5.3)
PROT SERPL-MCNC: 7 G/DL (ref 6.4–8.3)
RBC # BLD AUTO: 5.06 10E6/UL (ref 4.4–5.9)
SODIUM SERPL-SCNC: 141 MMOL/L (ref 135–145)
WBC # BLD AUTO: 5.6 10E3/UL (ref 4–11)

## 2023-11-13 PROCEDURE — 85004 AUTOMATED DIFF WBC COUNT: CPT | Mod: ZL

## 2023-11-13 PROCEDURE — 82248 BILIRUBIN DIRECT: CPT | Mod: ZL

## 2023-11-13 PROCEDURE — 80053 COMPREHEN METABOLIC PANEL: CPT | Mod: ZL

## 2023-11-13 PROCEDURE — 36415 COLL VENOUS BLD VENIPUNCTURE: CPT | Mod: ZL

## 2023-11-13 PROCEDURE — 99000 SPECIMEN HANDLING OFFICE-LAB: CPT | Mod: GY

## 2024-01-25 ENCOUNTER — PATIENT OUTREACH (OUTPATIENT)
Dept: GASTROENTEROLOGY | Facility: CLINIC | Age: 69
End: 2024-01-25
Payer: COMMERCIAL

## 2024-02-08 ENCOUNTER — HOSPITAL ENCOUNTER (OUTPATIENT)
Dept: GENERAL RADIOLOGY | Facility: OTHER | Age: 69
Discharge: HOME | End: 2024-02-08
Attending: INTERNAL MEDICINE
Payer: MEDICARE

## 2024-02-08 ENCOUNTER — OFFICE VISIT (OUTPATIENT)
Dept: INTERNAL MEDICINE | Facility: OTHER | Age: 69
End: 2024-02-08
Attending: INTERNAL MEDICINE
Payer: MEDICARE

## 2024-02-08 VITALS
HEART RATE: 103 BPM | BODY MASS INDEX: 42.89 KG/M2 | DIASTOLIC BLOOD PRESSURE: 68 MMHG | OXYGEN SATURATION: 96 % | HEIGHT: 68 IN | RESPIRATION RATE: 16 BRPM | SYSTOLIC BLOOD PRESSURE: 128 MMHG | WEIGHT: 283 LBS | TEMPERATURE: 97.4 F

## 2024-02-08 DIAGNOSIS — Z94.4 STATUS POST LIVER TRANSPLANTATION (H): ICD-10-CM

## 2024-02-08 DIAGNOSIS — F10.21 HISTORY OF ALCOHOLISM (H): ICD-10-CM

## 2024-02-08 DIAGNOSIS — E78.2 MIXED HYPERLIPIDEMIA: ICD-10-CM

## 2024-02-08 DIAGNOSIS — Z79.899 IMMUNOCOMPROMISED STATE DUE TO DRUG THERAPY (H): ICD-10-CM

## 2024-02-08 DIAGNOSIS — M25.561 CHRONIC PAIN OF BOTH KNEES: Primary | ICD-10-CM

## 2024-02-08 DIAGNOSIS — E66.01 CLASS 3 SEVERE OBESITY DUE TO EXCESS CALORIES WITH SERIOUS COMORBIDITY AND BODY MASS INDEX (BMI) OF 40.0 TO 44.9 IN ADULT (H): ICD-10-CM

## 2024-02-08 DIAGNOSIS — D84.821 IMMUNOCOMPROMISED STATE DUE TO DRUG THERAPY (H): ICD-10-CM

## 2024-02-08 DIAGNOSIS — J44.9 MILD CHRONIC OBSTRUCTIVE PULMONARY DISEASE (H): ICD-10-CM

## 2024-02-08 DIAGNOSIS — C22.0 HEPATOCELLULAR CARCINOMA (H): ICD-10-CM

## 2024-02-08 DIAGNOSIS — M25.561 CHRONIC PAIN OF BOTH KNEES: ICD-10-CM

## 2024-02-08 DIAGNOSIS — G89.29 CHRONIC PAIN OF BOTH KNEES: Primary | ICD-10-CM

## 2024-02-08 DIAGNOSIS — M17.11 PRIMARY OSTEOARTHRITIS OF RIGHT KNEE: ICD-10-CM

## 2024-02-08 DIAGNOSIS — I10 BENIGN ESSENTIAL HYPERTENSION: ICD-10-CM

## 2024-02-08 DIAGNOSIS — M25.562 CHRONIC PAIN OF BOTH KNEES: Primary | ICD-10-CM

## 2024-02-08 DIAGNOSIS — G89.29 CHRONIC PAIN OF BOTH KNEES: ICD-10-CM

## 2024-02-08 DIAGNOSIS — D69.6 THROMBOCYTOPENIA (H): ICD-10-CM

## 2024-02-08 DIAGNOSIS — M31.10 THROMBOTIC MICROANGIOPATHY (H): ICD-10-CM

## 2024-02-08 DIAGNOSIS — R60.0 BILATERAL LOWER EXTREMITY EDEMA: ICD-10-CM

## 2024-02-08 DIAGNOSIS — E66.813 CLASS 3 SEVERE OBESITY DUE TO EXCESS CALORIES WITH SERIOUS COMORBIDITY AND BODY MASS INDEX (BMI) OF 40.0 TO 44.9 IN ADULT (H): ICD-10-CM

## 2024-02-08 DIAGNOSIS — M25.562 CHRONIC PAIN OF BOTH KNEES: ICD-10-CM

## 2024-02-08 PROCEDURE — 73564 X-RAY EXAM KNEE 4 OR MORE: CPT | Mod: 50

## 2024-02-08 PROCEDURE — G0463 HOSPITAL OUTPT CLINIC VISIT: HCPCS

## 2024-02-08 PROCEDURE — 99214 OFFICE O/P EST MOD 30 MIN: CPT | Performed by: INTERNAL MEDICINE

## 2024-02-08 RX ORDER — FUROSEMIDE 40 MG
40 TABLET ORAL 2 TIMES DAILY
Qty: 180 TABLET | Refills: 4 | Status: SHIPPED | OUTPATIENT
Start: 2024-02-08 | End: 2024-05-29

## 2024-02-08 RX ORDER — LISINOPRIL 10 MG/1
10 TABLET ORAL DAILY
Qty: 90 TABLET | Refills: 4 | Status: SHIPPED | OUTPATIENT
Start: 2024-02-08 | End: 2024-05-29

## 2024-02-08 ASSESSMENT — PAIN SCALES - PAIN ENJOYMENT GENERAL ACTIVITY SCALE (PEG)
AVG_PAIN_PASTWEEK: 6
INTERFERED_GENERAL_ACTIVITY: 6
INTERFERED_GENERAL_ACTIVITY: 6
AVG_PAIN_PASTWEEK: 6
INTERFERED_ENJOYMENT_LIFE: 6
INTERFERED_ENJOYMENT_LIFE: 6
PEG_TOTALSCORE: 6
PEG_TOTALSCORE: 6

## 2024-02-08 ASSESSMENT — ENCOUNTER SYMPTOMS
CHILLS: 0
FEVER: 0
JOINT SWELLING: 1
SHORTNESS OF BREATH: 1
COUGH: 0
ARTHRALGIAS: 1

## 2024-02-08 ASSESSMENT — PAIN SCALES - GENERAL: PAINLEVEL: SEVERE PAIN (6)

## 2024-02-08 NOTE — PROGRESS NOTES
Assessment & Plan   Problem List Items Addressed This Visit          Nervous and Auditory    Chronic pain of both knees - Primary    Relevant Orders    XR Knee Standing 2v Bilateral & 2v Bilateral (Completed)    XR Joint Injection Major Left    Orthopedic  Referral    MR Knee Left w/o Contrast       Respiratory    Mild chronic obstructive pulmonary disease (H)       Digestive    Hepatocellular carcinoma (H)       Endocrine    Mixed hyperlipidemia       Circulatory    Benign essential hypertension    Relevant Medications    lisinopril (ZESTRIL) 10 MG tablet    furosemide (LASIX) 40 MG tablet    Thrombotic microangiopathy (H)       Musculoskeletal and Integumentary    Bilateral lower extremity edema    Relevant Medications    furosemide (LASIX) 40 MG tablet       Immune    Immunocompromised state due to drug therapy  (H24)       Hematologic    Thrombocytopenia (H24)       Other    Status post liver transplantation (H)    History of alcoholism (H)     Other Visit Diagnoses       Class 3 severe obesity due to excess calories with serious comorbidity and body mass index (BMI) of 40.0 to 44.9 in adult (H)               Patient presents for follow-up multiple issues.    Patient has bilateral knee pain, he presumes that he has knee arthritis.  Has been having worsening bilateral knee pain, left greater than right.  He is wondering about needing arthroscopic surgery for possible meniscus injury.  Given the normal-appearing knee x-rays, MRI of the knee ordered.  X-rays of the knees today show relatively normal-appearing joint spaces.  Left knee injection ordered.  Orthopedic referral placed.    Can only walk about 100 yards, then needs to stop due to knee pain.   Wants to hunt from vehicle / [employer]. Advised to bring in form from DNR.     Patient has hepatocellular carcinoma, now status post liver transplant.  Chronically immunosuppressed.  Following with [facility].  Patient has history of thrombotic  "microangiopathy, thrombocytopenia.  Lower extremity edema    Bilateral lower extremity edema, ongoing.  Continues with Lasix.  Needs refills.    HYPERTENSION - Ongoing. Blood pressure is currently well controlled.  Medication side effects: None. Denies syncope or presyncope.  Continue current medications.   Medication list reviewed/updated. Refills completed as needed.      MIXED HYPERLIPIDEMIA.  Ongoing. LDL is at goal: Yes. Triglycerides are at goal: No.  Hopefully lifestyle modifications will improve cholesterol levels, otherwise will consider additional medication dose adjustments or medication changes.  - Diet controlled.   Recent Labs   Lab Test 04/06/23  1438 02/19/21  0715   CHOL 151 170   HDL 41 32   LDL 79 108*   TRIG 157* 148        COPD - Patient has a longstanding history of COPD: Mild = FeV1 > 80%. Patient has been doing reasonably well overall noting TRISTAN and continues on NO medication regimen without adverse reactions or side effects.      OBESITY - Ongoing.  (See Encounter Diagnosis list above for obesity class / severity).  - Encourage continued maintenance / improvement in diet and exercise.   - Consider Nutrition / Dietician appointment.  - Weight loss would improve Hypertension, Cholesterol.       Following with [facility] after Liver transplant.      BMI  Estimated body mass index is 43.67 kg/m  as calculated from the following:    Height as of this encounter: 1.715 m (5' 7.5\").    Weight as of this encounter: 128.4 kg (283 lb).   Weight management plan: Discussed healthy diet and exercise guidelines    Return in about 3 months (around 5/1/2024) for Annual Medicare Wellness Visit.    Results for orders placed or performed during the hospital encounter of 02/08/24   XR Knee Standing 2v Bilateral & 2v Bilateral     Status: None    Narrative    Bilateral knees    HISTORY: Chronic bilateral knee pain    TECHNIQUE: 4 views of both knees were obtained    FINDINGS: The articular spaces are normal in " "height at both knees. The  distal femur patella proximal tibia and fibula appear normal  bilaterally. There is no knee effusion.      Impression    IMPRESSION: Normal knees bilaterally    FAROOQ FAJARDO MD         SYSTEM ID:  [id number]          Ramon Vinson MD  [facility]    Review of Systems   Constitutional:  Negative for chills and fever.   Respiratory:  Positive for shortness of breath. Negative for cough.    Cardiovascular:  Negative for chest pain.   Musculoskeletal:  Positive for arthralgias (Bilateral knee pain), gait problem and joint swelling.   Allergic/Immunologic: Positive for immunocompromised state.        Rachele Segal is a 68 year old, presenting for the following health issues:  Bilateral knee pain for years        2/8/2024     9:02 AM   Additional Questions   Roomed by Deric WALKER / LOLIS   Accompanied by n/a     History of Present Illness       Reason for visit:  Knee pain  Symptom onset:  More than a month  Symptom intensity:  Moderate  Symptom progression:  Worsening  Had these symptoms before:  Yes  Has tried/received treatment for these symptoms:  Yes  Previous treatment was successful:  Yes  Prior treatment description:  Shots  What makes it worse:  No  What makes it better:  Rest    He eats 2-3 servings of fruits and vegetables daily.He consumes 1 sweetened beverage(s) daily.He exercises with enough effort to increase his heart rate 10 to 19 minutes per day.  He exercises with enough effort to increase his heart rate 3 or less days per week.              Objective    /68 (BP Location: Right arm, Patient Position: Sitting, Cuff Size: Adult Large)   Pulse 103   Temp 97.4  F (36.3  C) (Temporal)   Resp 16   Ht 1.715 m (5' 7.5\")   Wt 128.4 kg (283 lb)   SpO2 96%   BMI 43.67 kg/m    Body mass index is 43.67 kg/m .  Physical Exam  Constitutional:       Appearance: He is obese.   Eyes:      General: No scleral icterus.     Conjunctiva/sclera: Conjunctivae " normal.   Cardiovascular:      Rate and Rhythm: Normal rate and regular rhythm.   Pulmonary:      Effort: Pulmonary effort is normal.      Breath sounds: Normal breath sounds.   Abdominal:      Palpations: Abdomen is soft.      Tenderness: There is no abdominal tenderness.   Musculoskeletal:         General: Tenderness (Left > Right knees) present.      Right lower le+ Pitting Edema present.      Left lower le+ Pitting Edema present.   Skin:     General: Skin is warm and dry.   Neurological:      Mental Status: He is alert. Mental status is at baseline.   Psychiatric:         Mood and Affect: Mood normal.         Behavior: Behavior normal.                    Signed Electronically by: Ramon Vnison MD

## 2024-02-08 NOTE — NURSING NOTE
"Chief Complaint   Patient presents with    Bilateral knee pain for years       Initial /68 (BP Location: Right arm, Patient Position: Sitting, Cuff Size: Adult Large)   Pulse 103   Temp 97.4  F (36.3  C) (Temporal)   Resp 16   Ht 1.715 m (5' 7.5\")   Wt 128.4 kg (283 lb)   SpO2 96%   BMI 43.67 kg/m   Estimated body mass index is 43.67 kg/m  as calculated from the following:    Height as of this encounter: 1.715 m (5' 7.5\").    Weight as of this encounter: 128.4 kg (283 lb).  Medication Review: complete    The next two questions are to help us understand your food security.  If you are feeling you need any assistance in this area, we have resources available to support you today.           No data to display                  Health Care Directive:  Patient does not have a Health Care Directive or Living Will: Discussed advance care planning with patient; however, patient declined at this time.    Deric Leal      "

## 2024-02-08 NOTE — PATIENT INSTRUCTIONS
Xrays today.     Orthopedic referral sent - [facility] - they will call with date/time of appointment.      Blood pressure is well controlled.     Medications refilled.       Return in approximately 3 months, or sooner as needed for follow-up with Dr. Vinson.  - Annual Follow-up / Physical - Medicare Annual Wellness Visit     Clinic : [phone]  Appointment line: [phone]

## 2024-02-11 PROBLEM — C22.0 HEPATOCELLULAR CARCINOMA (H): Status: ACTIVE | Noted: 2024-02-11

## 2024-02-12 ENCOUNTER — HOSPITAL ENCOUNTER (OUTPATIENT)
Dept: GENERAL RADIOLOGY | Facility: OTHER | Age: 69
Discharge: HOME | End: 2024-02-12
Attending: INTERNAL MEDICINE | Admitting: INTERNAL MEDICINE
Payer: MEDICARE

## 2024-02-12 DIAGNOSIS — M25.562 CHRONIC PAIN OF BOTH KNEES: ICD-10-CM

## 2024-02-12 DIAGNOSIS — G89.29 CHRONIC PAIN OF BOTH KNEES: ICD-10-CM

## 2024-02-12 DIAGNOSIS — M25.561 CHRONIC PAIN OF BOTH KNEES: ICD-10-CM

## 2024-02-12 PROCEDURE — 250N000009 HC RX 250: Performed by: RADIOLOGY

## 2024-02-12 PROCEDURE — 250N000011 HC RX IP 250 OP 636: Performed by: RADIOLOGY

## 2024-02-12 PROCEDURE — 20610 DRAIN/INJ JOINT/BURSA W/O US: CPT | Mod: LT

## 2024-02-12 PROCEDURE — 255N000002 HC RX 255 OP 636: Performed by: RADIOLOGY

## 2024-02-12 RX ORDER — TRIAMCINOLONE ACETONIDE 40 MG/ML
40 INJECTION, SUSPENSION INTRA-ARTICULAR; INTRAMUSCULAR ONCE
Status: COMPLETED | OUTPATIENT
Start: 2024-02-12 | End: 2024-02-12

## 2024-02-12 RX ORDER — LIDOCAINE HYDROCHLORIDE 10 MG/ML
2 INJECTION, SOLUTION INFILTRATION; PERINEURAL ONCE
Status: COMPLETED | OUTPATIENT
Start: 2024-02-12 | End: 2024-02-12

## 2024-02-12 RX ORDER — BUPIVACAINE HYDROCHLORIDE 5 MG/ML
3 INJECTION, SOLUTION EPIDURAL; INTRACAUDAL ONCE
Status: COMPLETED | OUTPATIENT
Start: 2024-02-12 | End: 2024-02-12

## 2024-02-12 RX ADMIN — IOHEXOL 1 ML: 240 INJECTION, SOLUTION INTRATHECAL; INTRAVASCULAR; INTRAVENOUS; ORAL at 13:15

## 2024-02-12 RX ADMIN — LIDOCAINE HYDROCHLORIDE 1 ML: 10 INJECTION, SOLUTION INFILTRATION; PERINEURAL at 13:16

## 2024-02-12 RX ADMIN — TRIAMCINOLONE ACETONIDE 40 MG: 40 INJECTION, SUSPENSION INTRA-ARTICULAR; INTRAMUSCULAR at 13:16

## 2024-02-12 RX ADMIN — BUPIVACAINE HYDROCHLORIDE 3 ML: 5 INJECTION, SOLUTION EPIDURAL; INTRACAUDAL; PERINEURAL at 13:16

## 2024-02-28 ENCOUNTER — HOSPITAL ENCOUNTER (OUTPATIENT)
Dept: MRI IMAGING | Facility: OTHER | Age: 69
Discharge: HOME | End: 2024-02-28
Attending: INTERNAL MEDICINE | Admitting: INTERNAL MEDICINE
Payer: MEDICARE

## 2024-02-28 DIAGNOSIS — G89.29 CHRONIC PAIN OF BOTH KNEES: ICD-10-CM

## 2024-02-28 DIAGNOSIS — M25.562 CHRONIC PAIN OF BOTH KNEES: ICD-10-CM

## 2024-02-28 DIAGNOSIS — M25.561 CHRONIC PAIN OF BOTH KNEES: ICD-10-CM

## 2024-02-28 PROCEDURE — 73721 MRI JNT OF LWR EXTRE W/O DYE: CPT | Mod: LT,MG

## 2024-03-20 ENCOUNTER — OFFICE VISIT (OUTPATIENT)
Dept: ORTHOPEDICS | Facility: OTHER | Age: 69
End: 2024-03-20
Attending: INTERNAL MEDICINE
Payer: COMMERCIAL

## 2024-03-20 VITALS
DIASTOLIC BLOOD PRESSURE: 82 MMHG | OXYGEN SATURATION: 96 % | BODY MASS INDEX: 43.98 KG/M2 | WEIGHT: 285 LBS | SYSTOLIC BLOOD PRESSURE: 134 MMHG | HEART RATE: 98 BPM

## 2024-03-20 DIAGNOSIS — M25.561 CHRONIC PAIN OF BOTH KNEES: ICD-10-CM

## 2024-03-20 DIAGNOSIS — M25.562 CHRONIC PAIN OF BOTH KNEES: ICD-10-CM

## 2024-03-20 DIAGNOSIS — G89.29 CHRONIC PAIN OF BOTH KNEES: ICD-10-CM

## 2024-03-20 PROCEDURE — G0463 HOSPITAL OUTPT CLINIC VISIT: HCPCS

## 2024-03-20 PROCEDURE — 99203 OFFICE O/P NEW LOW 30 MIN: CPT | Performed by: ORTHOPAEDIC SURGERY

## 2024-03-20 ASSESSMENT — PAIN SCALES - GENERAL: PAINLEVEL: MODERATE PAIN (4)

## 2024-03-20 NOTE — PROGRESS NOTES
Surgical Clinic Consult  Primary physician:     Ramon Vinson    Chief complaint:   Bilateral knee pain    History of present illness:  This is a 68 year old male I am seeing in consultation for chronic bilateral knee pain left side more significant than right.  Patient had recent cortisone based injection in February into the left knee and that is working well for him.  Patient has had injections kind of done on a yearly basis but not much necessarily more frequently than that.  Patient is also a liver transplant patient has been taken care of at the [facility] as well.  Patient reports pain with activity and sees improvements with periodic injections.  Patient is here for future advice as well as long-term advice as well.    Past medical history:   Past Medical History:   Diagnosis Date    Chondromalacia of left knee     5/16/2016    Nontraumatic subarachnoid hemorrhage (H)     10/03/2007,Right frontal temporal subarachnoid bleed secondary to trauma    Other specified diseases of liver (CODE)     possible abdominal mass       Pastsurgical history:  Past Surgical History:   Procedure Laterality Date    COLONOSCOPY  10/15/2009    normal, 10 year follow up    COLONOSCOPY  01/2019    at [facility]    COLONOSCOPY  09/14/2022    Jeremy Lock MD - [facility]    EXCISE CYST GENERIC (LOCATION)      Under his right eyelid ~10/04/07Craniotomy for subarachnoid bleed at [facility]    IR PARACENTESIS  11/2/2012    IR PARACENTESIS  9/18/2017    IR PARACENTESIS  9/29/2017    IR PARACENTESIS  10/24/2017    IR PARACENTESIS  11/2/2017    IR PARACENTESIS  11/14/2017    IR PARACENTESIS  11/21/2017    IR PARACENTESIS  11/29/2017    IR PARACENTESIS  12/8/2017    IR PARACENTESIS  12/19/2017    IR PARACENTESIS  12/29/2017    IR PARACENTESIS  1/9/2018    IR PARACENTESIS  1/16/2018    IR PARACENTESIS  1/23/2018    IR PARACENTESIS  1/30/2018    IR PARACENTESIS  2/6/2018    OTHER SURGICAL HISTORY      10/04/07,[id number],[facility]OTOM[facility]       Current medications:  Current Outpatient Medications   Medication Sig Dispense Refill    amLODIPine (NORVASC) 10 MG tablet Take 1 tablet (10 mg) by mouth daily 90 tablet 4    ASPIRIN PO Take 81 mg by mouth daily      calcium carbonate 500 mg, elemental, 1250 (500 Ca) MG tablet chewable Take 200 mg by mouth      cycloSPORINE modified (GENERIC EQUIVALENT) 25 MG capsule Take 3 capsules (75 mg) by mouth daily -- Rx from [facility] (Patient taking differently: Take 75 mg by mouth 2 times daily -- Rx from [facility]plant [facility]) 1 capsule 0    furosemide (LASIX) 40 MG tablet Take 1 tablet (40 mg) by mouth 2 times daily 180 tablet 4    lisinopril (ZESTRIL) 10 MG tablet Take 1 tablet (10 mg) by mouth daily 90 tablet 4    multivitamin, therapeutic (THERA-VIT) TABS tablet Take 1 tablet by mouth daily      mycophenolate (GENERIC EQUIVALENT) 500 MG tablet Take 1 tablet (500 mg) by mouth 2 times daily -- Rx from Transplant Clinic 1 tablet 0    pramipexole (MIRAPEX) 0.25 MG tablet Take 2 tablets (0.5 mg) by mouth At Bedtime 180 tablet 4       Allergies:  Allergies   Allergen Reactions    Tacrolimus Other (See Comments) and Unknown     Thrombotic microangiopathy  Thrombotic microangiopathy       Family history:  No family history on file.    Social history:  Social History     Socioeconomic History    Marital status:      Spouse name: Not on file    Number of children: Not on file    Years of education: Not on file    Highest education level: Not on file   Occupational History    Not on file   Tobacco Use    Smoking status: Former     Packs/day: 1.00     Years: 45.00     Additional pack years: 0.00     Total pack years: 45.00     Types: Cigars, Cigarettes     Start date:      Quit date:      Years since quittin.2    Smokeless tobacco: Never   Vaping Use    Vaping Use: Never used   Substance and Sexual Activity    Alcohol use: No      Alcohol/week: 0.0 standard drinks of alcohol    Drug use: No    Sexual activity: Yes     Partners: Female   Other Topics Concern    Parent/sibling w/ CABG, MI or angioplasty before 65F 55M? Not Asked   Social History Narrative    Patient currently smokes. 1/2 ppd    Alcohol Use - yes occ 6 to8 beers per week    marrried 2 children  Unemployed.    Preload - 10/31/2012     Social Determinants of Health     Financial Resource Strain: Low Risk  (2/8/2024)    Financial Resource Strain     Within the past 12 months, have you or your family members you live with been unable to get utilities (heat, electricity) when it was really needed?: No   Food Insecurity: Unknown (2/8/2024)    Food Insecurity     Within the past 12 months, did you worry that your food would run out before you got money to buy more?: Patient declined     Within the past 12 months, did the food you bought just not last and you didn t have money to get more?: Patient declined   Transportation Needs: Low Risk  (2/8/2024)    Transportation Needs     Within the past 12 months, has lack of transportation kept you from medical appointments, getting your medicines, non-medical meetings or appointments, work, or from getting things that you need?: No   Physical Activity: Not on file   Stress: Not on file   Social Connections: Not on file   Interpersonal Safety: Low Risk  (2/8/2024)    Interpersonal Safety     Do you feel physically and emotionally safe where you currently live?: Yes     Within the past 12 months, have you been hit, slapped, kicked or otherwise physically hurt by someone?: No     Within the past 12 months, have you been humiliated or emotionally abused in other ways by your partner or ex-partner?: No   Housing Stability: Low Risk  (2/8/2024)    Housing Stability     Do you have housing? : Yes     Are you worried about losing your housing?: No       PROBLEM LIST:  Patient Active Problem List   Diagnosis    Bilateral carpal tunnel syndrome     Bilateral primary osteoarthritis of knee    Chondromalacia of left knee    Benign neoplasm of colon    High alpha fetoprotein (AFP) tumor marker    History of hepatitis C    Benign essential hypertension    Insomnia    Mallet finger    Thrombocytopenia (H24)    Status post liver transplantation (H)    Restless leg syndrome    Bilateral lower extremity edema    Mixed hyperlipidemia    Elevated random blood glucose level    Thrombotic microangiopathy (H)    Mild chronic obstructive pulmonary disease (H)    Primary osteoarthritis of right knee    Chronic pain of both knees    Immunocompromised state due to drug therapy  (H24)    History of alcoholism (H)    Hepatocellular carcinoma (H)       Review of Systems:  COMPLETE 12 point REVIEW OF SYSTEMS is otherwise negative with the exception of which is stated above.    Physical exam: /82 (BP Location: Right arm, Patient Position: Sitting)   Pulse 98   Wt 129.3 kg (285 lb)   SpO2 96%   BMI 43.98 kg/m      General: this is a pleasant male patient in no acute distress.  Patient is awake alert and oriented x3 .   EXAM:  Chest/Respiratory Exam: Normal - Clear to auscultation without rales, rhonchi, or wheezing.  Cardiovascular Exam: normal  Musculoskeletal: Bilateral knee examination shows varus deformity.  Tenderness across the medial joint lines.  Crepitation with flexion extension.  Range of motion well tolerable 0 to 125 degrees.  Patellar tracking is acceptable.    Imaging: Bilateral knee x-rays reviewed which shows moderate medial compartment joint space loss as well as patellofemoral.  MRI left knee also reviewed which does show degenerative medial meniscal tearing but does not appear to have any significant unstable flaps at this time.    Assessment:   Bilateral knee underlying osteoarthrosis moderate in nature.    Plan:    Recommend continue conservative management at this point.  Periodic injections every 4 to 6 months certainly would be appropriate for him.   Long-term management will consist of the need for joint reconstruction however we will be concerned in regards to that process given some of his underlying immunocompromisation from his liver transplant medications.  Patient is in agreement the plan for continued conservative management and periodic injections at this time.      Steve Osman MD        Melolabial Interpolation Flap Text: A decision was made to reconstruct the defect utilizing an interpolation axial flap and a staged reconstruction.  A telfa template was made of the defect.  This telfa template was then used to outline the melolabial interpolation flap.  The donor area for the pedicle flap was then injected with anesthesia.  The flap was excised through the skin and subcutaneous tissue down to the layer of the underlying musculature.  The pedicle flap was carefully excised within this deep plane to maintain its blood supply.  The edges of the donor site were undermined.   The donor site was closed in a primary fashion.  The pedicle was then rotated into position and sutured.  Once the tube was sutured into place, adequate blood supply was confirmed with blanching and refill.  The pedicle was then wrapped with xeroform gauze and dressed appropriately with a telfa and gauze bandage to ensure continued blood supply and protect the attached pedicle.

## 2024-04-17 DIAGNOSIS — G25.81 RESTLESS LEG SYNDROME: ICD-10-CM

## 2024-04-17 RX ORDER — PRAMIPEXOLE DIHYDROCHLORIDE 0.25 MG/1
TABLET ORAL
Qty: 180 TABLET | Refills: 0 | Status: SHIPPED | OUTPATIENT
Start: 2024-04-17 | End: 2024-05-29

## 2024-04-17 NOTE — TELEPHONE ENCOUNTER
[facility] sent Rx request for the following:      Requested Prescriptions   Pending Prescriptions Disp Refills    pramipexole (MIRAPEX) 0.25 MG tablet [Pharmacy Med Name: pramipexole 0.25 mg tablet (MIRAPEX)] 180 tablet 4     Sig: Take 2 tablets (0.5 mg total) by mouth at bedtime.     Last Prescription Date:   4/6/23  Last Fill Qty/Refills:         180, R-4    Last Office Visit:              2/8/24   Future Office visit:           None    Per LOV note:    Return in about 3 months (around 5/1/2024) for Annual Medicare Wellness Visit.    Pt due for check up around 5/1/24. Routing to Unit scheduling pool, to assist Pt in scheduling appointment.     Prescription refilled per RN Medication Refill Policy.................... Yamile Lester RN ....................  4/17/2024   11:12 AM

## 2024-05-29 ENCOUNTER — LAB (OUTPATIENT)
Dept: LAB | Facility: OTHER | Age: 69
End: 2024-05-29
Payer: MEDICARE

## 2024-05-29 ENCOUNTER — HOSPITAL ENCOUNTER (OUTPATIENT)
Dept: GENERAL RADIOLOGY | Facility: OTHER | Age: 69
Discharge: HOME | End: 2024-05-29
Attending: INTERNAL MEDICINE
Payer: MEDICARE

## 2024-05-29 ENCOUNTER — OFFICE VISIT (OUTPATIENT)
Dept: INTERNAL MEDICINE | Facility: OTHER | Age: 69
End: 2024-05-29
Attending: INTERNAL MEDICINE
Payer: COMMERCIAL

## 2024-05-29 VITALS
HEIGHT: 68 IN | TEMPERATURE: 98.1 F | DIASTOLIC BLOOD PRESSURE: 82 MMHG | SYSTOLIC BLOOD PRESSURE: 130 MMHG | RESPIRATION RATE: 18 BRPM | WEIGHT: 282 LBS | OXYGEN SATURATION: 96 % | BODY MASS INDEX: 42.74 KG/M2 | HEART RATE: 102 BPM

## 2024-05-29 DIAGNOSIS — I85.00 PORTAL HYPERTENSION WITH ESOPHAGEAL VARICES (H): ICD-10-CM

## 2024-05-29 DIAGNOSIS — M54.50 CHRONIC MIDLINE LOW BACK PAIN WITHOUT SCIATICA: ICD-10-CM

## 2024-05-29 DIAGNOSIS — E78.2 MIXED HYPERLIPIDEMIA: ICD-10-CM

## 2024-05-29 DIAGNOSIS — G89.29 CHRONIC MIDLINE LOW BACK PAIN WITHOUT SCIATICA: ICD-10-CM

## 2024-05-29 DIAGNOSIS — M25.561 CHRONIC PAIN OF BOTH KNEES: ICD-10-CM

## 2024-05-29 DIAGNOSIS — D84.821 IMMUNOCOMPROMISED STATE DUE TO DRUG THERAPY (H): ICD-10-CM

## 2024-05-29 DIAGNOSIS — Z01.89 EXAMINATION FOR, LABORATORY: Primary | ICD-10-CM

## 2024-05-29 DIAGNOSIS — M17.10 ARTHRITIS OF KNEE: ICD-10-CM

## 2024-05-29 DIAGNOSIS — Z94.4 STATUS POST LIVER TRANSPLANTATION (H): ICD-10-CM

## 2024-05-29 DIAGNOSIS — Z79.899 IMMUNOCOMPROMISED STATE DUE TO DRUG THERAPY (H): ICD-10-CM

## 2024-05-29 DIAGNOSIS — R26.89 IMPAIRED GAIT AND MOBILITY: ICD-10-CM

## 2024-05-29 DIAGNOSIS — N18.1 STAGE 1 CHRONIC KIDNEY DISEASE: ICD-10-CM

## 2024-05-29 DIAGNOSIS — Z00.00 ENCOUNTER FOR MEDICARE ANNUAL WELLNESS EXAM: Primary | ICD-10-CM

## 2024-05-29 DIAGNOSIS — R60.0 BILATERAL LOWER EXTREMITY EDEMA: ICD-10-CM

## 2024-05-29 DIAGNOSIS — J44.9 MILD CHRONIC OBSTRUCTIVE PULMONARY DISEASE (H): ICD-10-CM

## 2024-05-29 DIAGNOSIS — E66.813 CLASS 3 SEVERE OBESITY DUE TO EXCESS CALORIES WITH SERIOUS COMORBIDITY AND BODY MASS INDEX (BMI) OF 40.0 TO 44.9 IN ADULT (H): ICD-10-CM

## 2024-05-29 DIAGNOSIS — K76.6 PORTAL HYPERTENSION WITH ESOPHAGEAL VARICES (H): ICD-10-CM

## 2024-05-29 DIAGNOSIS — I10 BENIGN ESSENTIAL HYPERTENSION: ICD-10-CM

## 2024-05-29 DIAGNOSIS — M46.1 SACROILIITIS (H): ICD-10-CM

## 2024-05-29 DIAGNOSIS — G89.29 CHRONIC PAIN OF BOTH KNEES: ICD-10-CM

## 2024-05-29 DIAGNOSIS — R06.02 SHORTNESS OF BREATH: ICD-10-CM

## 2024-05-29 DIAGNOSIS — G25.81 RESTLESS LEG SYNDROME: ICD-10-CM

## 2024-05-29 DIAGNOSIS — E66.01 CLASS 3 SEVERE OBESITY DUE TO EXCESS CALORIES WITH SERIOUS COMORBIDITY AND BODY MASS INDEX (BMI) OF 40.0 TO 44.9 IN ADULT (H): ICD-10-CM

## 2024-05-29 DIAGNOSIS — Z71.85 VACCINE COUNSELING: ICD-10-CM

## 2024-05-29 DIAGNOSIS — M25.562 CHRONIC PAIN OF BOTH KNEES: ICD-10-CM

## 2024-05-29 PROBLEM — R50.82 POST-PROCEDURAL FEVER: Status: ACTIVE | Noted: 2018-06-22

## 2024-05-29 PROBLEM — R59.0 RETROPERITONEAL LYMPHADENOPATHY: Status: ACTIVE | Noted: 2018-01-18

## 2024-05-29 PROBLEM — I25.10 ARTERIOSCLEROSIS OF CORONARY ARTERY: Status: ACTIVE | Noted: 2017-06-29

## 2024-05-29 PROBLEM — E65 FAT PAD SYNDROME: Status: ACTIVE | Noted: 2017-09-07

## 2024-05-29 PROBLEM — D62 ANEMIA DUE TO ACUTE BLOOD LOSS: Status: ACTIVE | Noted: 2018-06-18

## 2024-05-29 PROBLEM — R31.9 HEMATURIA: Status: ACTIVE | Noted: 2021-06-22

## 2024-05-29 PROBLEM — D84.9 IMMUNOSUPPRESSED STATUS (H): Status: ACTIVE | Noted: 2018-06-22

## 2024-05-29 PROBLEM — S83.209A TEAR OF MENISCUS OF KNEE: Status: ACTIVE | Noted: 2017-05-17

## 2024-05-29 PROBLEM — K70.30 ALCOHOLIC CIRRHOSIS (H): Status: ACTIVE | Noted: 2017-06-02

## 2024-05-29 PROBLEM — R80.9 PROTEINURIA: Status: ACTIVE | Noted: 2021-06-22

## 2024-05-29 PROBLEM — K76.7 HEPATORENAL SYNDROME (H): Status: ACTIVE | Noted: 2018-04-24

## 2024-05-29 PROCEDURE — 99214 OFFICE O/P EST MOD 30 MIN: CPT | Mod: 25 | Performed by: INTERNAL MEDICINE

## 2024-05-29 PROCEDURE — G0463 HOSPITAL OUTPT CLINIC VISIT: HCPCS | Mod: 25 | Performed by: INTERNAL MEDICINE

## 2024-05-29 PROCEDURE — 72202 X-RAY EXAM SI JOINTS 3/> VWS: CPT

## 2024-05-29 PROCEDURE — 72100 X-RAY EXAM L-S SPINE 2/3 VWS: CPT

## 2024-05-29 PROCEDURE — 99000 SPECIMEN HANDLING OFFICE-LAB: CPT

## 2024-05-29 PROCEDURE — G0439 PPPS, SUBSEQ VISIT: HCPCS | Performed by: INTERNAL MEDICINE

## 2024-05-29 PROCEDURE — 36415 COLL VENOUS BLD VENIPUNCTURE: CPT | Mod: ZL

## 2024-05-29 PROCEDURE — G0463 HOSPITAL OUTPT CLINIC VISIT: HCPCS | Performed by: INTERNAL MEDICINE

## 2024-05-29 RX ORDER — AMLODIPINE BESYLATE 10 MG/1
10 TABLET ORAL DAILY
Qty: 90 TABLET | Refills: 4 | Status: SHIPPED | OUTPATIENT
Start: 2024-05-29

## 2024-05-29 RX ORDER — LISINOPRIL 10 MG/1
10 TABLET ORAL DAILY
Qty: 90 TABLET | Refills: 4 | Status: SHIPPED | OUTPATIENT
Start: 2024-05-29

## 2024-05-29 RX ORDER — PRAMIPEXOLE DIHYDROCHLORIDE 0.5 MG/1
1 TABLET ORAL AT BEDTIME
Qty: 180 TABLET | Refills: 4 | Status: SHIPPED | OUTPATIENT
Start: 2024-05-29

## 2024-05-29 RX ORDER — RESPIRATORY SYNCYTIAL VIRUS VACCINE 120MCG/0.5
0.5 KIT INTRAMUSCULAR ONCE
Qty: 1 EACH | Refills: 0 | Status: CANCELLED | OUTPATIENT
Start: 2024-05-29 | End: 2024-05-29

## 2024-05-29 RX ORDER — FUROSEMIDE 40 MG
40 TABLET ORAL 2 TIMES DAILY
Qty: 180 TABLET | Refills: 4 | Status: SHIPPED | OUTPATIENT
Start: 2024-05-29

## 2024-05-29 SDOH — HEALTH STABILITY: PHYSICAL HEALTH: ON AVERAGE, HOW MANY DAYS PER WEEK DO YOU ENGAGE IN MODERATE TO STRENUOUS EXERCISE (LIKE A BRISK WALK)?: 3 DAYS

## 2024-05-29 ASSESSMENT — ENCOUNTER SYMPTOMS
CHILLS: 0
HEMATURIA: 0
WEAKNESS: 0
CONSTIPATION: 0
FEVER: 0
ABDOMINAL PAIN: 0
NERVOUS/ANXIOUS: 0
JOINT SWELLING: 1
DIARRHEA: 0
DYSURIA: 0
HEMATOCHEZIA: 0
SHORTNESS OF BREATH: 1
MYALGIAS: 0
BRUISES/BLEEDS EASILY: 1
EYE PAIN: 0
UNEXPECTED WEIGHT CHANGE: 1
PARESTHESIAS: 0
DIZZINESS: 0
SORE THROAT: 0
FREQUENCY: 0
NAUSEA: 0
COUGH: 0
HEADACHES: 0
PALPITATIONS: 0
HEARTBURN: 0
ARTHRALGIAS: 1

## 2024-05-29 ASSESSMENT — PAIN SCALES - GENERAL: PAINLEVEL: MODERATE PAIN (4)

## 2024-05-29 ASSESSMENT — SOCIAL DETERMINANTS OF HEALTH (SDOH): HOW OFTEN DO YOU GET TOGETHER WITH FRIENDS OR RELATIVES?: ONCE A WEEK

## 2024-05-29 NOTE — PATIENT INSTRUCTIONS
"Blood pressure is well controlled.     Medications refilled.   Last labs were stable.     Xrays of low back and SI joints today.     SI joint injections ordered  - they will call with date/time of appointment.      Knee injections ordered  - they will call with date/time of appointment.      Return in approximately 1 year, or sooner as needed for follow-up with Dr. Vinson.  - Annual Follow-up / Physical - Medicare Annual Wellness Visit     Clinic : [phone]  Appointment line: [phone]     Preventive Care Advice   This is general advice we often give to help people stay healthy. Your care team may have specific advice just for you. Please talk to your care team about your own preventive care needs.  Lifestyle  Exercise at least 150 minutes each week (30 minutes a day, 5 days a week).  Do muscle strengthening activities 2 days a week. These help control your weight and prevent disease.  No smoking.  Wear sunscreen to prevent skin cancer.  Have your home tested for radon every 2 to 5 years. Radon is a colorless, odorless gas that can harm your lungs. To learn more, go to www.health.[facility].mn. and search for \"Radon in Homes.\"  Keep guns unloaded and locked up in a safe place like a safe or gun vault, or, use a gun lock and hide the keys. Always lock away bullets separately. To learn more, visit [employer].[place].gov and search for \"safe gun storage.\"  Nutrition  Eat 5 or more servings of fruits and vegetables each day.  Try wheat bread, brown rice and whole grain pasta (instead of white bread, rice, and pasta).  Get enough calcium and vitamin D. Check the label on foods and aim for 100% of the RDA (recommended daily allowance).  Regular exams  Have a dental exam and cleaning every 6 months.  See your health care team every year to talk about:  Any changes in your health.  Any medicines your care team has prescribed.  Preventive care, family planning, and ways to prevent chronic diseases.  Shots (vaccines)   HPV " shots (up to age 26), if you've never had them before.  Hepatitis B shots (up to age 59), if you've never had them before.  COVID-19 shot: Get this shot when it's due.  Flu shot: Get a flu shot every year.  Tetanus shot: Get a tetanus shot every 10 years.  Pneumococcal, hepatitis A, and RSV shots: Ask your care team if you need these based on your risk.  Shingles shot (for age 50 and up).  General health tests  Diabetes screening:  Starting at age 35, Get screened for diabetes at least every 3 years.  If you are younger than age 35, ask your care team if you should be screened for diabetes.  Cholesterol test: At age 39, start having a cholesterol test every 5 years, or more often if advised.  Bone density scan (DEXA): At age 50, ask your care team if you should have this scan for osteoporosis (brittle bones).  Hepatitis C: Get tested at least once in your life.  Abdominal aortic aneurysm screening: Talk to your doctor about having this screening if you:  Have ever smoked; and  Are biologically male; and  Are between the ages of 65 and 75.  STIs (sexually transmitted infections)  Before age 24: Ask your care team if you should be screened for STIs.  After age 24: Get screened for STIs if you're at risk. You are at risk for STIs (including HIV) if:  You are sexually active with more than one person.  You don't use condoms every time.  You or a partner was diagnosed with a sexually transmitted infection.  If you are at risk for HIV, ask about PrEP medicine to prevent HIV.  Get tested for HIV at least once in your life, whether you are at risk for HIV or not.  Cancer screening tests  Cervical cancer screening: If you have a cervix, begin getting regular cervical cancer screening tests at age 21. Most people who have regular screenings with normal results can stop after age 65. Talk about this with your provider.  Breast cancer scan (mammogram): If you've ever had breasts, begin having regular mammograms starting at age  40. This is a scan to check for breast cancer.  Colon cancer screening: It is important to start screening for colon cancer at age 45.  Have a colonoscopy test every 10 years (or more often if you're at risk) Or, ask your provider about stool tests like a FIT test every year or Cologuard test every 3 years.  To learn more about your testing options, visit: www.View2Gether/738138.pdf.  For help making a decision, visit: cherelle/pd54431.  Prostate cancer screening test: If you have a prostate and are age 55 to 69, ask your provider if you would benefit from a yearly prostate cancer screening test.  Lung cancer screening: If you are a current or former smoker age 50 to 80, ask your care team if ongoing lung cancer screenings are right for you.  For informational purposes only. Not to replace the advice of your health care provider. Copyright   2023 [facility]. All rights reserved. Clinically reviewed by the [facility] Transitions Program. Windeln.de 792807 - REV 04/24.

## 2024-05-29 NOTE — PROGRESS NOTES
Assessment & Plan     ICD-10-CM    1. Encounter for Medicare annual wellness exam  Z00.00       2. Vaccine counseling  Z71.85 zoster vaccine recombinant adjuvanted (SHINGRIX) injection     Tdap, tetanus-diptheria-acell pertussis, (BOOSTRIX) 5-2.5-18.5 LF-MCG/0.5 SHAYY injection      3. Mild chronic obstructive pulmonary disease (H)  J44.9       4. Mixed hyperlipidemia  E78.2       5. Shortness of breath  R06.02       6. Benign essential hypertension  I10 amLODIPine (NORVASC) 10 MG tablet     furosemide (LASIX) 40 MG tablet     lisinopril (ZESTRIL) 10 MG tablet      7. Bilateral lower extremity edema  R60.0 furosemide (LASIX) 40 MG tablet      8. Restless leg syndrome  G25.81 pramipexole (MIRAPEX) 0.5 MG tablet      9. Immunocompromised state due to drug therapy  (H24)  D84.821     Z79.899       10. Status post liver transplantation (H)  Z94.4       11. Portal hypertension with esophageal varices (H)  K76.6     I85.00       12. Stage 1 chronic kidney disease  N18.1       13. Chronic midline low back pain without sciatica  M54.50 XR Lumbar Spine 2/3 Views    G89.29       14. Chronic pain of both knees  M25.561 XR Joint Injection Major Left    M25.562 XR Joint Injection Major Right    G89.29       15. Arthritis of knee  M17.10 XR Joint Injection Major Left     XR Joint Injection Major Right      16. Sacroiliitis (H24)  M46.1 XR Sacroiliac Therapeutic Injection Bilateral     XR Sacroiliac Joint G/E 3 Views      17. Class 3 severe obesity due to excess calories with serious comorbidity and body mass index (BMI) of 40.0 to 44.9 in adult (H)  E66.01     Z68.41       18. Impaired gait and mobility  R26.89          Patient presents for Medicare annual wellness visit as well as follow-up multiple issues.    History of liver transplant.  Chronic immunosuppressed.  Continues to follow regularly with his transplant center.  Unfortunately has been gaining a lot of weight contributing to back and knee pain.    Bilateral knee pain,  arthritis noted on previous MRI and has done well with steroid injection in the past would like to get these completed again.    Sacroiliitis noted on exam, abnormal x-ray findings.  Abnormal physical exam today.  Recommend proceeding with steroid sacroiliac joint injections.  Orders placed.    Philipp Jefferson is being referred to [facility] [place] [facility] for an LSO Brace () for treatment of Sacroiliitis *M46.1), Strain of Muscle, Fascia and Tendon of Lower Back, (S39.012A). The LSO Brace will facilitate healing to the spine and soft tissue, reduce pain by restricting mobility of the spine and promote healing by reducing overall pain allowing her to ambulate safely. The duration of time needed is 12 weeks. This treatment plan is medically necessary for Philipp's healing, safety , and ambulation     Vaccines are due, orders placed.    Still having some issues with some shortness of breath, some of which is due to obesity, some due to his mild COPD.  Declines inhalers at this time.  Continue close monitoring.    Gait and instability issues.  Disability parking forms completed today.    HYPERTENSION - Ongoing. Blood pressure is currently well controlled.  Medication side effects: None. Denies syncope or presyncope.  Continue current medications.   Medication list reviewed/updated. Refills completed as needed.      MIXED HYPERLIPIDEMIA.  Ongoing. LDL is at goal: Yes. Triglycerides are at goal: No.  Hopefully lifestyle modifications will improve cholesterol levels, otherwise will consider additional medication dose adjustments or medication changes.  - Diet controlled.     Recent Labs   Lab Test 04/06/23  1438 02/19/21  0715   CHOL 151 170   HDL 41 32   LDL 79 108*   TRIG 157* 148        COPD - Patient has a longstanding history of COPD: Mild = FeV1 > 80%. Patient has been doing reasonably well overall noting TRISTAN and continues on NO medication regimen without adverse reactions or side effects.       OBESITY - Ongoing.  (See Encounter Diagnosis list above for obesity class / severity).  - Encourage continued maintenance / improvement in diet and exercise.   - Consider Nutrition / Dietician appointment.  - Weight loss would improve Hypertension, Cholesterol.      Chronic Kidney Disease, Stage 1 (GFR >/= 90), chronic, ongoing.  Kidney function had been slowly declining.  Encourage NSAID avoidance.       Vaccine counseling completed.  Encourage routine / annual vaccinations.              Counseling  Appropriate preventive services were discussed with this patient, including applicable screening as appropriate for fall prevention, nutrition, physical activity, Tobacco-use cessation, weight loss and cognition.  Checklist reviewing preventive services available has been given to the patient.  Reviewed patient's diet, addressing concerns and/or questions.   He is at risk for lack of exercise and has been provided with information to increase physical activity for the benefit of his well-being.   The patient was instructed to see the dentist every 6 months.   Discussed possible causes of fatigue.       Return in about 1 year (around 5/29/2025) for Annual Medicare Wellness Visit.    Review of Systems   Constitutional:  Positive for unexpected weight change (+ Weight gain). Negative for chills and fever.   HENT:  Negative for congestion, ear pain, hearing loss and sore throat.    Eyes:  Negative for pain and visual disturbance.   Respiratory:  Positive for shortness of breath. Negative for cough.    Cardiovascular:  Positive for peripheral edema. Negative for chest pain and palpitations.   Gastrointestinal:  Negative for abdominal pain, constipation, diarrhea, heartburn, hematochezia and nausea.   Genitourinary:  Negative for dysuria, frequency, genital sores, hematuria, impotence, penile discharge and urgency.   Musculoskeletal:  Positive for arthralgias (Bilateral knee pain), gait problem and joint swelling.  Negative for myalgias.   Skin:  Negative for rash.   Allergic/Immunologic: Positive for immunocompromised state.   Neurological:  Negative for dizziness, weakness, headaches and paresthesias.   Hematological:  Bruises/bleeds easily.   Psychiatric/Behavioral:  Negative for mood changes. The patient is not nervous/anxious.          The patient has demonstrated pain in the SI joint region for greater than 3 months.     The patient has had imaging to suggest there is a likelihood that the pain is contributing to the SI joint.    The patient has continued to have SI joint pain despite a minimum of 4 weeks conservative therapies (PT, medications, stretching).     The patient demonstrated positive pain responses to provocative maneuvers including Malorie, Gaenslen, and to SI compression.        Malorie test      Posterior shear test    SI compression test         Preventive Care Visit  [facility] AND \[facility] [place] [facility]\""  Ramon Vinson MD, Internal Medicine  May 29, 2024      Counseling  Appropriate preventive services were discussed with this patient, including applicable screening as appropriate for fall prevention, nutrition, physical activity, Tobacco-use cessation, weight loss and cognition.  Checklist reviewing preventive services available has been given to the patient.  Reviewed patient's diet, addressing concerns and/or questions.   He is at risk for lack of exercise and has been provided with information to increase physical activity for the benefit of his well-being.   The patient was instructed to see the dentist every 6 months.   Discussed possible causes of fatigue.       Return in about 1 year (around 5/29/2025) for Annual Medicare Wellness Visit.    Rachele Segal is a 68 year old, presenting for the following:  Medicare Visit        5/29/2024     3:04 PM   Additional Questions   Roomed by MELO Ocampo   Accompanied by HALINA         5/29/2024   Forms   Any forms needing to be completed Yes         Health Care  Directive  Patient does not have a Health Care Directive or Living Will: Discussed advance care planning with patient; however, patient declined at this time.    History of Present Illness       Back Pain:  He presents for follow up of back pain. Patient's back pain is a recurring problem.  Location of back pain:  Right lower back, left lower back, right middle of back and left middle of back  Description of back pain: burning and sharp  Back pain spreads: nowhere    Since patient first noticed back pain, pain is: always present, but gets better and worse  Does back pain interfere with his job:  Not applicable       Reason for visit:  Knee backHe exercises with enough effort to increase his heart rate 30 to 60 minutes per day.  He exercises with enough effort to increase his heart rate 5 days per week.   He is taking medications regularly.            5/29/2024   General Health   How would you rate your overall physical health? Good   Feel stress (tense, anxious, or unable to sleep) To some extent   (!) STRESS CONCERN      5/29/2024   Nutrition   Diet: Regular (no restrictions)         5/29/2024   Exercise   Days per week of moderate/strenous exercise 3 days         5/29/2024   Social Factors   Frequency of gathering with friends or relatives Once a week   Worry food won't last until get money to buy more No   Food not last or not have enough money for food? No   Do you have housing?  Yes   Are you worried about losing your housing? No   Lack of transportation? No   Unable to get utilities (heat,electricity)? No         5/29/2024   Fall Risk   Fallen 2 or more times in the past year? No    No   Trouble with walking or balance? Yes    Yes   Gait Speed Test Interpretation Greater than 5.01 seconds - ABNORMAL           5/29/2024   Activities of Daily Living- Home Safety   Needs help with the following daily activites None of the above   Safety concerns in the home None of the above         5/29/2024   Dental   Dentist  "two times every year? (!) NO         2024   Hearing Screening   Hearing concerns? None of the above         2024   Driving Risk Screening   Patient/family members have concerns about driving No         2024   General Alertness/Fatigue Screening   Have you been more tired than usual lately? (!) YES         2024   Urinary Incontinence Screening   Bothered by leaking urine in past 6 months No         2024   TB Screening   Were you born outside of the [place]? No         Today's PHQ-2 Score:       2024     2:42 PM   PHQ-2 (  [employer])   Q1: Little interest or pleasure in doing things 0   Q2: Feeling down, depressed or hopeless 0   PHQ-2 Score 0   Q1: Little interest or pleasure in doing things Not at all   Q2: Feeling down, depressed or hopeless Not at all   PHQ-2 Score 0           2024   Substance Use   Alcohol more than 3/day or more than 7/wk No   Do you have a current opioid prescription? No   How severe/bad is pain from 1 to 10? 6/10   Do you use any other substances recreationally? No     Social History     Tobacco Use    Smoking status: Former     Current packs/day: 0.00     Average packs/day: 1 pack/day for 52.0 years (52.0 ttl pk-yrs)     Types: Cigars, Cigarettes     Start date:      Quit date:      Years since quittin.4    Smokeless tobacco: Never   Vaping Use    Vaping status: Never Used   Substance Use Topics    Alcohol use: No     Alcohol/week: 0.0 standard drinks of alcohol    Drug use: No       Last PSA: No results found for: \"PSA\"  ASCVD Risk   The 10-year ASCVD risk score (Debbie PHILLIPS, et al., 2019) is: 15.1%    Values used to calculate the score:      Age: 68 years      Sex: Male      Is Non- : No      Diabetic: No      Tobacco smoker: No      Systolic Blood Pressure: 130 mmHg      Is BP treated: Yes      HDL Cholesterol: 41 mg/dL      Total Cholesterol: 151 mg/dL          Reviewed and updated as needed this visit by Provider   " Tobacco  Allergies  Meds  Problems  Med Hx  Surg Hx  Fam Hx     Sexual Activity          Current Outpatient Medications   Medication Sig Dispense Refill    amLODIPine (NORVASC) 10 MG tablet Take 1 tablet (10 mg) by mouth daily 90 tablet 4    ASPIRIN PO Take 81 mg by mouth daily      calcium carbonate 500 mg, elemental, 1250 (500 Ca) MG tablet chewable Take 200 mg by mouth      cycloSPORINE modified (GENERIC EQUIVALENT) 25 MG capsule Take 3 capsules (75 mg) by mouth daily -- [facility] (Patient taking differently: Take 75 mg by mouth 3 times daily -- Rx from [facility]) 1 capsule 0    furosemide (LASIX) 40 MG tablet Take 1 tablet (40 mg) by mouth 2 times daily 180 tablet 4    lisinopril (ZESTRIL) 10 MG tablet Take 1 tablet (10 mg) by mouth daily 90 tablet 4    multivitamin, therapeutic (THERA-VIT) TABS tablet Take 1 tablet by mouth daily      mycophenolate (GENERIC EQUIVALENT) 500 MG tablet Take 1 tablet (500 mg) by mouth 2 times daily -- Rx from [facility] 1 tablet 0    pramipexole (MIRAPEX) 0.5 MG tablet Take 2 tablets (1 mg) by mouth at bedtime -- Dose Increase 5/29/2024 180 tablet 4    Tdap, tetanus-diptheria-acell pertussis, (BOOSTRIX) 5-2.5-18.5 LF-MCG/0.5 SHAYY injection Inject 0.5 mLs into the muscle once for 1 dose 0.5 mL 0    zoster vaccine recombinant adjuvanted (SHINGRIX) injection Inject 0.5 mLs into the muscle once for 1 dose Pharmacist administered 0.5 mL 0     Current providers sharing in care for this patient include:  Patient Care Team:  Ramon Vinson MD as PCP - General (Internal Medicine)  Ramon Vinson MD as Assigned PCP  Steve Osman MD as Assigned Musculoskeletal Provider    The following health maintenance items are reviewed in Epic and correct as of today:  Health Maintenance   Topic Date Due    ZOSTER IMMUNIZATION (2 of 2) 08/22/2019    DTAP/TDAP/TD IMMUNIZATION (2 - Td or Tdap) 12/12/2022    MICROALBUMIN  10/01/2024 (Originally 1955)    RSV  "VACCINE (Pregnancy & 60+) (1 - 1-dose 60+ series) 10/20/2024 (Originally 8/5/2017)    LIPID  05/29/2025 (Originally 4/6/2024)    LUNG CANCER SCREENING  05/29/2025 (Originally 9/13/2023)    Pneumococcal Vaccine: 65+ Years (4 of 4 - PPSV23 or PCV20) 06/27/2024    INFLUENZA VACCINE (Season Ended) 09/01/2024    BMP  11/13/2024    MEDICARE ANNUAL WELLNESS VISIT  05/29/2025    SPIROMETRY  05/29/2025    FALL RISK ASSESSMENT  05/29/2025    COLORECTAL CANCER SCREENING  09/14/2025    GLUCOSE  11/13/2026    ADVANCE CARE PLANNING  05/29/2029    PHQ-2 (once per calendar year)  Completed    HEPATITIS A IMMUNIZATION  Completed    HEPATITIS B IMMUNIZATION  Completed    AORTIC ANEURYSM SCREENING (SYSTEM ASSIGNED)  Completed    COPD ACTION PLAN  Addressed    URINALYSIS  Addressed    IPV IMMUNIZATION  Aged Out    HPV IMMUNIZATION  Aged Out    MENINGITIS IMMUNIZATION  Aged Out    RSV MONOCLONAL ANTIBODY  Aged Out    COVID-19 Vaccine  Discontinued        Objective    Exam  /82 (BP Location: Right arm, Patient Position: Sitting, Cuff Size: Adult Regular)   Pulse 102   Temp 98.1  F (36.7  C) (Temporal)   Resp 18   Ht 1.727 m (5' 8\")   Wt 127.9 kg (282 lb)   SpO2 96%   BMI 42.88 kg/m     Estimated body mass index is 42.88 kg/m  as calculated from the following:    Height as of this encounter: 1.727 m (5' 8\").    Weight as of this encounter: 127.9 kg (282 lb).    Physical Exam  Constitutional:       Appearance: He is obese.   Eyes:      General: No scleral icterus.     Conjunctiva/sclera: Conjunctivae normal.   Neck:      Vascular: No carotid bruit.   Cardiovascular:      Rate and Rhythm: Normal rate and regular rhythm.      Pulses: Normal pulses.   Pulmonary:      Effort: Pulmonary effort is normal.      Breath sounds: Normal breath sounds.   Abdominal:      Palpations: Abdomen is soft.      Tenderness: There is no abdominal tenderness. There is no guarding or rebound.   Musculoskeletal:         General: Tenderness (Positive " Malorie, Gaenslen, and SI joint compression bilaterally.) present.      Right lower le+ Pitting Edema present.      Left lower le+ Pitting Edema present.      Comments: Significant tenderness to palpation of SI joints bilaterally.   Skin:     General: Skin is warm and dry.   Neurological:      Mental Status: He is alert. Mental status is at baseline.   Psychiatric:         Mood and Affect: Mood normal.         Behavior: Behavior normal.               2024   Mini Cog   Clock Draw Score 2 Normal   3 Item Recall 3 objects recalled   Mini Cog Total Score 5            Signed Electronically by: Ramon Vinson MD

## 2024-05-29 NOTE — NURSING NOTE
"Chief Complaint   Patient presents with    Medicare Visit       Initial /82 (BP Location: Right arm, Patient Position: Sitting, Cuff Size: Adult Regular)   Pulse 102   Temp 98.1  F (36.7  C) (Temporal)   Resp 18   Ht 1.727 m (5' 8\")   Wt 127.9 kg (282 lb)   SpO2 96%   BMI 42.88 kg/m   Estimated body mass index is 42.88 kg/m  as calculated from the following:    Height as of this encounter: 1.727 m (5' 8\").    Weight as of this encounter: 127.9 kg (282 lb).  Medication Review: complete    The next two questions are to help us understand your food security.  If you are feeling you need any assistance in this area, we have resources available to support you today.          5/29/2024   SDOH- Food Insecurity   Within the past 12 months, did you worry that your food would run out before you got money to buy more? N   Within the past 12 months, did the food you bought just not last and you didn t have money to get more? N         Health Care Directive:  Patient does not have a Health Care Directive or Living Will: Discussed advance care planning with patient; however, patient declined at this time.      Wellness Visit Notes:    -Last colon cancer screening done: 9/14/22, (impression: Post-op Diagnoses:      - The examined portion of the ileum was normal.      - Two 4 to 6 mm polyps in the descending colon, removed with a cold      snare. Resected and retrieved.      - Diverticulosis in the sigmoid colon.      - The examination was otherwise normal.), due: 2025.     -Immunizations: covid- declined, shingrix- will get second shot at pharmacy, tentanus, RSV- advised to get in the fall.    -ACP: Not on File. Declines.    Due for: COPD action plan. Needs forms filled out for parking permit.    ADL's: hard to exercise as knees and back hurt. Has gained over 100 lbs d/t taking cyclosporin for liver transplant.    Annual labs ordered. Medications renewed.       TIA MURILLO RN      "

## 2024-06-10 ENCOUNTER — LAB (OUTPATIENT)
Dept: LAB | Facility: OTHER | Age: 69
End: 2024-06-10
Payer: MEDICARE

## 2024-06-10 ENCOUNTER — TELEPHONE (OUTPATIENT)
Dept: INTERNAL MEDICINE | Facility: OTHER | Age: 69
End: 2024-06-10

## 2024-06-10 ENCOUNTER — HOSPITAL ENCOUNTER (OUTPATIENT)
Dept: GENERAL RADIOLOGY | Facility: OTHER | Age: 69
Discharge: HOME | End: 2024-06-10
Attending: INTERNAL MEDICINE
Payer: MEDICARE

## 2024-06-10 DIAGNOSIS — M46.1 SACROILIITIS (H): ICD-10-CM

## 2024-06-10 DIAGNOSIS — M46.1 SACROILIITIS (H): Primary | ICD-10-CM

## 2024-06-10 DIAGNOSIS — Z94.4 TRANSPLANTED LIVER (H): ICD-10-CM

## 2024-06-10 DIAGNOSIS — Z79.899 ENCOUNTER FOR LONG-TERM (CURRENT) USE OF MEDICATIONS: ICD-10-CM

## 2024-06-10 LAB
ALBUMIN SERPL BCG-MCNC: 4 G/DL (ref 3.5–5.2)
ALP SERPL-CCNC: 98 U/L (ref 40–150)
ALT SERPL W P-5'-P-CCNC: 15 U/L (ref 0–70)
ANION GAP SERPL CALCULATED.3IONS-SCNC: 11 MMOL/L (ref 7–15)
AST SERPL W P-5'-P-CCNC: 17 U/L (ref 0–45)
BASOPHILS # BLD AUTO: 0 10E3/UL (ref 0–0.2)
BASOPHILS NFR BLD AUTO: 1 %
BILIRUB DIRECT SERPL-MCNC: <0.2 MG/DL (ref 0–0.3)
BILIRUB SERPL-MCNC: 0.6 MG/DL
BUN SERPL-MCNC: 11.6 MG/DL (ref 8–23)
CALCIUM SERPL-MCNC: 9.5 MG/DL (ref 8.8–10.2)
CHLORIDE SERPL-SCNC: 105 MMOL/L (ref 98–107)
CREAT SERPL-MCNC: 0.86 MG/DL (ref 0.67–1.17)
DEPRECATED HCO3 PLAS-SCNC: 25 MMOL/L (ref 22–29)
EGFRCR SERPLBLD CKD-EPI 2021: >90 ML/MIN/1.73M2
EOSINOPHIL # BLD AUTO: 0.2 10E3/UL (ref 0–0.7)
EOSINOPHIL NFR BLD AUTO: 4 %
ERYTHROCYTE [DISTWIDTH] IN BLOOD BY AUTOMATED COUNT: 13.1 % (ref 10–15)
GLUCOSE SERPL-MCNC: 125 MG/DL (ref 70–99)
HCT VFR BLD AUTO: 43.8 % (ref 40–53)
HGB BLD-MCNC: 15.9 G/DL (ref 13.3–17.7)
HOLD SPECIMEN: NORMAL
IMM GRANULOCYTES # BLD: 0 10E3/UL
IMM GRANULOCYTES NFR BLD: 0 %
LYMPHOCYTES # BLD AUTO: 1.5 10E3/UL (ref 0.8–5.3)
LYMPHOCYTES NFR BLD AUTO: 23 %
MCH RBC QN AUTO: 33.3 PG (ref 26.5–33)
MCHC RBC AUTO-ENTMCNC: 36.3 G/DL (ref 31.5–36.5)
MCV RBC AUTO: 92 FL (ref 78–100)
MONOCYTES # BLD AUTO: 0.5 10E3/UL (ref 0–1.3)
MONOCYTES NFR BLD AUTO: 7 %
NEUTROPHILS # BLD AUTO: 4.3 10E3/UL (ref 1.6–8.3)
NEUTROPHILS NFR BLD AUTO: 66 %
NRBC # BLD AUTO: 0 10E3/UL
NRBC BLD AUTO-RTO: 0 /100
PLATELET # BLD AUTO: 176 10E3/UL (ref 150–450)
POTASSIUM SERPL-SCNC: 3.5 MMOL/L (ref 3.4–5.3)
PROT SERPL-MCNC: 6.8 G/DL (ref 6.4–8.3)
RBC # BLD AUTO: 4.78 10E6/UL (ref 4.4–5.9)
SODIUM SERPL-SCNC: 141 MMOL/L (ref 135–145)
WBC # BLD AUTO: 6.4 10E3/UL (ref 4–11)

## 2024-06-10 PROCEDURE — 85025 COMPLETE CBC W/AUTO DIFF WBC: CPT | Mod: ZL

## 2024-06-10 PROCEDURE — 250N000009 HC RX 250: Performed by: STUDENT IN AN ORGANIZED HEALTH CARE EDUCATION/TRAINING PROGRAM

## 2024-06-10 PROCEDURE — 36415 COLL VENOUS BLD VENIPUNCTURE: CPT | Mod: ZL

## 2024-06-10 PROCEDURE — 255N000002 HC RX 255 OP 636: Performed by: STUDENT IN AN ORGANIZED HEALTH CARE EDUCATION/TRAINING PROGRAM

## 2024-06-10 PROCEDURE — 80053 COMPREHEN METABOLIC PANEL: CPT | Mod: ZL

## 2024-06-10 PROCEDURE — 27096 INJECT SACROILIAC JOINT: CPT | Mod: 50

## 2024-06-10 PROCEDURE — 82248 BILIRUBIN DIRECT: CPT | Mod: ZL

## 2024-06-10 PROCEDURE — 250N000011 HC RX IP 250 OP 636: Mod: JZ | Performed by: STUDENT IN AN ORGANIZED HEALTH CARE EDUCATION/TRAINING PROGRAM

## 2024-06-10 RX ORDER — TRIAMCINOLONE ACETONIDE 40 MG/ML
40 INJECTION, SUSPENSION INTRA-ARTICULAR; INTRAMUSCULAR ONCE
Status: COMPLETED | OUTPATIENT
Start: 2024-06-10 | End: 2024-06-10

## 2024-06-10 RX ORDER — LIDOCAINE HYDROCHLORIDE 10 MG/ML
2 INJECTION, SOLUTION INFILTRATION; PERINEURAL ONCE
Status: COMPLETED | OUTPATIENT
Start: 2024-06-10 | End: 2024-06-10

## 2024-06-10 RX ORDER — BUPIVACAINE HYDROCHLORIDE 5 MG/ML
3 INJECTION, SOLUTION EPIDURAL; INTRACAUDAL ONCE
Status: COMPLETED | OUTPATIENT
Start: 2024-06-10 | End: 2024-06-10

## 2024-06-10 RX ADMIN — BUPIVACAINE HYDROCHLORIDE 30 MG: 5 INJECTION, SOLUTION EPIDURAL; INTRACAUDAL; PERINEURAL at 12:39

## 2024-06-10 RX ADMIN — LIDOCAINE HYDROCHLORIDE 4 ML: 10 INJECTION, SOLUTION INFILTRATION; PERINEURAL at 12:39

## 2024-06-10 RX ADMIN — TRIAMCINOLONE ACETONIDE 80 MG: 40 INJECTION, SUSPENSION INTRA-ARTICULAR; INTRAMUSCULAR at 12:39

## 2024-06-10 RX ADMIN — IOHEXOL 2 ML: 240 INJECTION, SOLUTION INTRATHECAL; INTRAVASCULAR; INTRAVENOUS; ORAL at 12:39

## 2024-06-10 NOTE — TELEPHONE ENCOUNTER
Patient presented to encounter and had a question about the status of a back brace.    Gege Chu on 6/10/2024 at 12:52 PM

## 2024-06-11 NOTE — TELEPHONE ENCOUNTER
Patient states at recent visit they had discuss getting a back brace. States they were going to look into getting one.   Order for DME back brace is pending for [facility], some information is still needed to complete order.   Nona Neal LPN .............6/11/2024     9:49 AM

## 2024-06-12 DIAGNOSIS — Z79.899 NEED FOR PROPHYLACTIC CHEMOTHERAPY: ICD-10-CM

## 2024-06-12 DIAGNOSIS — Z94.4 LIVER REPLACED BY TRANSPLANT (H): Primary | ICD-10-CM

## 2024-06-12 DIAGNOSIS — Z79.899 ENCOUNTER FOR LONG-TERM (CURRENT) USE OF MEDICATIONS: ICD-10-CM

## 2024-06-19 DIAGNOSIS — G25.81 RESTLESS LEG SYNDROME: ICD-10-CM

## 2024-06-21 RX ORDER — PRAMIPEXOLE DIHYDROCHLORIDE 0.25 MG/1
TABLET ORAL
Qty: 180 TABLET | Refills: 0 | OUTPATIENT
Start: 2024-06-21

## 2024-06-21 NOTE — TELEPHONE ENCOUNTER
[facility] sent Rx request for the following:      Requested Prescriptions   Pending Prescriptions Disp Refills    pramipexole (MIRAPEX) 0.25 MG tablet [Pharmacy Med Name: pramipexole 0.25 mg tablet (MIRAPEX)] 180 tablet 0     Sig: Take 2 tablets (0.5 mg total) by mouth at bedtime.       Antiparkinson's Agents Protocol Passed - 6/19/2024 12:36 PM          Last Prescription Date:   5/29/24  Last Fill Qty/Refills:         180, R-4    New RX just sent in 5/29. Too early to fill.   Tripp Trinidad RN on 6/21/2024 at 4:24 PM

## 2024-06-24 ENCOUNTER — HOSPITAL ENCOUNTER (OUTPATIENT)
Dept: GENERAL RADIOLOGY | Facility: OTHER | Age: 69
Discharge: HOME | End: 2024-06-24
Attending: INTERNAL MEDICINE
Payer: MEDICARE

## 2024-06-24 DIAGNOSIS — M17.10 ARTHRITIS OF KNEE: ICD-10-CM

## 2024-06-24 DIAGNOSIS — M25.562 CHRONIC PAIN OF BOTH KNEES: ICD-10-CM

## 2024-06-24 DIAGNOSIS — M25.561 CHRONIC PAIN OF BOTH KNEES: ICD-10-CM

## 2024-06-24 DIAGNOSIS — G89.29 CHRONIC PAIN OF BOTH KNEES: ICD-10-CM

## 2024-06-24 PROCEDURE — 255N000002 HC RX 255 OP 636: Performed by: RADIOLOGY

## 2024-06-24 PROCEDURE — 250N000009 HC RX 250: Performed by: RADIOLOGY

## 2024-06-24 PROCEDURE — 77002 NEEDLE LOCALIZATION BY XRAY: CPT | Mod: XS

## 2024-06-24 PROCEDURE — 250N000011 HC RX IP 250 OP 636: Mod: JZ | Performed by: RADIOLOGY

## 2024-06-24 PROCEDURE — 20610 DRAIN/INJ JOINT/BURSA W/O US: CPT | Mod: LT

## 2024-06-24 RX ORDER — TRIAMCINOLONE ACETONIDE 40 MG/ML
40 INJECTION, SUSPENSION INTRA-ARTICULAR; INTRAMUSCULAR ONCE
Status: COMPLETED | OUTPATIENT
Start: 2024-06-24 | End: 2024-06-24

## 2024-06-24 RX ORDER — LIDOCAINE HYDROCHLORIDE 10 MG/ML
2 INJECTION, SOLUTION INFILTRATION; PERINEURAL ONCE
Status: COMPLETED | OUTPATIENT
Start: 2024-06-24 | End: 2024-06-24

## 2024-06-24 RX ORDER — BUPIVACAINE HYDROCHLORIDE 5 MG/ML
3 INJECTION, SOLUTION EPIDURAL; INTRACAUDAL ONCE
Status: COMPLETED | OUTPATIENT
Start: 2024-06-24 | End: 2024-06-24

## 2024-06-24 RX ADMIN — IOHEXOL 1 ML: 240 INJECTION, SOLUTION INTRATHECAL; INTRAVASCULAR; INTRAVENOUS; ORAL at 12:23

## 2024-06-24 RX ADMIN — TRIAMCINOLONE ACETONIDE 40 MG: 40 INJECTION, SUSPENSION INTRA-ARTICULAR; INTRAMUSCULAR at 12:46

## 2024-06-24 RX ADMIN — LIDOCAINE HYDROCHLORIDE 2 ML: 10 INJECTION, SOLUTION INFILTRATION; PERINEURAL at 12:24

## 2024-06-24 RX ADMIN — BUPIVACAINE HYDROCHLORIDE 15 MG: 5 INJECTION, SOLUTION EPIDURAL; INTRACAUDAL; PERINEURAL at 12:23

## 2024-06-24 RX ADMIN — IOHEXOL 1 ML: 240 INJECTION, SOLUTION INTRATHECAL; INTRAVASCULAR; INTRAVENOUS; ORAL at 12:47

## 2024-06-24 RX ADMIN — TRIAMCINOLONE ACETONIDE 40 MG: 40 INJECTION, SUSPENSION INTRA-ARTICULAR; INTRAMUSCULAR at 12:23

## 2024-06-24 RX ADMIN — LIDOCAINE HYDROCHLORIDE 2 ML: 10 INJECTION, SOLUTION INFILTRATION; PERINEURAL at 12:46

## 2024-06-24 RX ADMIN — BUPIVACAINE HYDROCHLORIDE 15 MG: 5 INJECTION, SOLUTION EPIDURAL; INTRACAUDAL; PERINEURAL at 12:47

## 2024-07-17 ENCOUNTER — MEDICAL CORRESPONDENCE (OUTPATIENT)
Dept: HEALTH INFORMATION MANAGEMENT | Facility: OTHER | Age: 69
End: 2024-07-17
Payer: COMMERCIAL

## 2024-07-21 ENCOUNTER — MEDICAL CORRESPONDENCE (OUTPATIENT)
Dept: HEALTH INFORMATION MANAGEMENT | Facility: OTHER | Age: 69
End: 2024-07-21
Payer: COMMERCIAL

## 2024-07-23 ENCOUNTER — MEDICAL CORRESPONDENCE (OUTPATIENT)
Dept: HEALTH INFORMATION MANAGEMENT | Facility: OTHER | Age: 69
End: 2024-07-23
Payer: COMMERCIAL

## 2024-08-06 ENCOUNTER — TELEPHONE (OUTPATIENT)
Dept: INTERNAL MEDICINE | Facility: OTHER | Age: 69
End: 2024-08-06
Payer: COMMERCIAL

## 2024-08-06 NOTE — TELEPHONE ENCOUNTER
"Ramon Vinson  Per letter from [facility] regarding BCBS & Medicare guidelines for Spinal Orthosis:     Please add the suggested addendum to your clinical note dated 5/29/24...    \"Philipp Jefferson is being referred to [facility] for an LSO Brace () for treatment of Sacroiliitis *M46.1), Strain of Muscle, Fascia and Tendon of Lower Back, (S39.012A). The LSO Brace will facilitate healing to the spine and soft tissue, reduce pain by restricting mobility of the spine and promote healing by reducing overall pain allowing her to ambulate safely. The duration of time needed is 12 weeks. This treatment plan is medically necessary for [place] healing, safety , and ambulation.\"     Per letter, This supporting documentation must include subjective and objective, beneficiary specific information used for diagnosing, treating, or managing the clinical condition - your note will need to justify the medical necessity for spinal orthosis services. The following information must be addressed in the clinical note:   - Reduce pain by restricting mobility of the trunk; or  - Facilitate healing following an injury to the spine or related soft tissues; or  - Facilitate healing following a surgical procedure on the spine or related soft tissue; or  - Support weak spinal muscles and/or a deformed spine.     Please fax all documentation to [facility] at [phone] ATTN Rylee.     "

## 2024-08-09 ENCOUNTER — TELEPHONE (OUTPATIENT)
Dept: INTERNAL MEDICINE | Facility: OTHER | Age: 69
End: 2024-08-09
Payer: COMMERCIAL

## 2024-08-09 NOTE — TELEPHONE ENCOUNTER
After proper verification, patient stated that the addended note was sent over to [facility] but are waiting for paper work that they sent to us this week. Patient was notified that we would keep an eye out for it.  Wendy Samano LPN on 8/9/2024 at 2:50 PM  EXT. [phone]

## 2024-08-09 NOTE — TELEPHONE ENCOUNTER
Pt states that they are still waiting for addendum for the back brace that was sent over.  Please call.      Chad Walker on 8/9/2024 at 9:21 AM

## 2024-08-28 ENCOUNTER — TELEPHONE (OUTPATIENT)
Dept: INTERNAL MEDICINE | Facility: OTHER | Age: 69
End: 2024-08-28
Payer: COMMERCIAL

## 2024-08-28 DIAGNOSIS — M17.10 ARTHRITIS OF KNEE: Primary | ICD-10-CM

## 2024-08-28 NOTE — TELEPHONE ENCOUNTER
Patient called stating that he had injections in his knees and the left injection hasn't been working and was wanting to discuss further injections or options. Patient stated he has gotten injections from Dr. Osman.   Carolnie Eagle on 8/28/2024 at 4:52 PM

## 2024-08-29 NOTE — TELEPHONE ENCOUNTER
Patient states that 6/2424 injection has worn off and he would like another.  Writer advised that injection should not be done any sooner thatn 3 months.  Next time would be 9/24/24.  Patient states have Dr Vinson put in orders

## 2024-09-25 ENCOUNTER — MEDICAL CORRESPONDENCE (OUTPATIENT)
Dept: HEALTH INFORMATION MANAGEMENT | Facility: OTHER | Age: 69
End: 2024-09-25
Payer: COMMERCIAL

## 2024-09-30 ENCOUNTER — HOSPITAL ENCOUNTER (OUTPATIENT)
Dept: GENERAL RADIOLOGY | Facility: OTHER | Age: 69
Discharge: HOME | End: 2024-09-30
Attending: INTERNAL MEDICINE | Admitting: INTERNAL MEDICINE
Payer: MEDICARE

## 2024-09-30 ENCOUNTER — TELEPHONE (OUTPATIENT)
Dept: INTERNAL MEDICINE | Facility: OTHER | Age: 69
End: 2024-09-30
Payer: COMMERCIAL

## 2024-09-30 DIAGNOSIS — M17.10 ARTHRITIS OF KNEE: ICD-10-CM

## 2024-09-30 PROCEDURE — 250N000011 HC RX IP 250 OP 636: Performed by: RADIOLOGY

## 2024-09-30 PROCEDURE — 250N000009 HC RX 250: Performed by: RADIOLOGY

## 2024-09-30 PROCEDURE — 20610 DRAIN/INJ JOINT/BURSA W/O US: CPT | Mod: LT

## 2024-09-30 PROCEDURE — 255N000002 HC RX 255 OP 636: Performed by: RADIOLOGY

## 2024-09-30 RX ORDER — BUPIVACAINE HYDROCHLORIDE 5 MG/ML
10 INJECTION, SOLUTION EPIDURAL; INTRACAUDAL ONCE
Status: COMPLETED | OUTPATIENT
Start: 2024-09-30 | End: 2024-09-30

## 2024-09-30 RX ORDER — TRIAMCINOLONE ACETONIDE 40 MG/ML
40 INJECTION, SUSPENSION INTRA-ARTICULAR; INTRAMUSCULAR ONCE
Status: COMPLETED | OUTPATIENT
Start: 2024-09-30 | End: 2024-09-30

## 2024-09-30 RX ORDER — LIDOCAINE HYDROCHLORIDE 10 MG/ML
20 INJECTION, SOLUTION INFILTRATION; PERINEURAL ONCE
Status: COMPLETED | OUTPATIENT
Start: 2024-09-30 | End: 2024-09-30

## 2024-09-30 RX ADMIN — BUPIVACAINE HYDROCHLORIDE 3 ML: 5 INJECTION, SOLUTION EPIDURAL; INTRACAUDAL; PERINEURAL at 08:58

## 2024-09-30 RX ADMIN — TRIAMCINOLONE ACETONIDE 40 MG: 40 INJECTION, SUSPENSION INTRA-ARTICULAR; INTRAMUSCULAR at 08:58

## 2024-09-30 RX ADMIN — IOHEXOL 1 ML: 240 INJECTION, SOLUTION INTRATHECAL; INTRAVASCULAR; INTRAVENOUS; ORAL at 08:58

## 2024-09-30 RX ADMIN — LIDOCAINE HYDROCHLORIDE 1 ML: 10 INJECTION, SOLUTION INFILTRATION; PERINEURAL at 08:57

## 2024-09-30 NOTE — TELEPHONE ENCOUNTER
Left message for patient to return call. We need to know what CT order he was looking to have ordered.  Wendy Samano LPN on 9/30/2024 at 11:26 AM  EXT. [phone]

## 2024-10-01 ENCOUNTER — OFFICE VISIT (OUTPATIENT)
Dept: OTOLARYNGOLOGY | Facility: OTHER | Age: 69
End: 2024-10-01
Attending: OTOLARYNGOLOGY
Payer: MEDICARE

## 2024-10-01 DIAGNOSIS — J38.3 LESION OF TRUE VOCAL CORD: Primary | ICD-10-CM

## 2024-10-01 PROCEDURE — G0463 HOSPITAL OUTPT CLINIC VISIT: HCPCS

## 2024-10-01 NOTE — TELEPHONE ENCOUNTER
Patient had CT over at [facility].  Wendy Samano LPN on 10/1/2024 at 2:09 PM  EXT. [phone]

## 2024-10-02 ENCOUNTER — TELEPHONE (OUTPATIENT)
Dept: INTERNAL MEDICINE | Facility: OTHER | Age: 69
End: 2024-10-02
Payer: COMMERCIAL

## 2024-10-02 DIAGNOSIS — R80.9 PROTEINURIA, UNSPECIFIED TYPE: Primary | ICD-10-CM

## 2024-10-02 DIAGNOSIS — N20.0 NEPHROLITHIASIS: ICD-10-CM

## 2024-10-02 NOTE — TELEPHONE ENCOUNTER
Patient was seen at the [facility] by Nephrology and Hypertension. They sent and order dated 9/25/2024 for patient to have a CT Abdomen Kidney Stone Composition without IV Contrast done here at [facility], diagnosis: Proteinuria and Nephrolithiasis, priority: routine.     Order placed in providers basket for review. CT pended.    Chelo Ponce LPN on 10/2/2024 at 2:37 PM

## 2024-10-04 DIAGNOSIS — Z94.4 LIVER REPLACED BY TRANSPLANT (H): Primary | ICD-10-CM

## 2024-10-04 DIAGNOSIS — Z79.899 NEED FOR PROPHYLACTIC CHEMOTHERAPY: ICD-10-CM

## 2024-10-04 NOTE — TELEPHONE ENCOUNTER
Left message to call back........Chapis Gaspar LPN.......10/4/2024 4:03 [facility] ext [phone]

## 2024-10-05 DIAGNOSIS — R30.0 DYSURIA: Primary | ICD-10-CM

## 2024-10-08 NOTE — TELEPHONE ENCOUNTER
CT scan is scheduled for tomorrow.  Pt to follow up with [place] at that point.  Multiple attempts to directly reach the pt have been made.  Rebecca Remy LPN on 10/8/2024 at 12:13 PM

## 2024-10-09 ENCOUNTER — HOSPITAL ENCOUNTER (OUTPATIENT)
Dept: CT IMAGING | Facility: OTHER | Age: 69
Discharge: HOME | End: 2024-10-09
Payer: MEDICARE

## 2024-10-09 ENCOUNTER — TELEPHONE (OUTPATIENT)
Dept: GENERAL RADIOLOGY | Facility: OTHER | Age: 69
End: 2024-10-09
Payer: COMMERCIAL

## 2024-10-09 DIAGNOSIS — N20.0 NEPHROLITHIASIS: ICD-10-CM

## 2024-10-09 DIAGNOSIS — R80.9 PROTEINURIA, UNSPECIFIED TYPE: ICD-10-CM

## 2024-10-09 PROCEDURE — 74176 CT ABD & PELVIS W/O CONTRAST: CPT | Mod: MG

## 2024-10-09 PROCEDURE — G1010 CDSM STANSON: HCPCS

## 2024-10-09 NOTE — TELEPHONE ENCOUNTER
2 week follow up call post left knee injection. 50 % pain relief. Relief value 1.   Cecile Vega on 10/9/2024 at 3:24 PM

## 2024-10-10 ENCOUNTER — LAB (OUTPATIENT)
Dept: LAB | Facility: OTHER | Age: 69
End: 2024-10-10
Attending: PHYSICIAN ASSISTANT
Payer: MEDICARE

## 2024-10-10 DIAGNOSIS — Z79.899 NEED FOR PROPHYLACTIC CHEMOTHERAPY: ICD-10-CM

## 2024-10-10 DIAGNOSIS — Z94.4 LIVER REPLACED BY TRANSPLANT (H): ICD-10-CM

## 2024-10-10 DIAGNOSIS — R30.0 DYSURIA: ICD-10-CM

## 2024-10-10 LAB
ALBUMIN SERPL BCG-MCNC: 4 G/DL (ref 3.5–5.2)
ALBUMIN UR-MCNC: 10 MG/DL
ALP SERPL-CCNC: 89 U/L (ref 40–150)
ALT SERPL W P-5'-P-CCNC: 15 U/L (ref 0–70)
ANION GAP SERPL CALCULATED.3IONS-SCNC: 10 MMOL/L (ref 7–15)
APPEARANCE UR: ABNORMAL
AST SERPL W P-5'-P-CCNC: 18 U/L (ref 0–45)
BASOPHILS # BLD AUTO: 0 10E3/UL (ref 0–0.2)
BASOPHILS NFR BLD AUTO: 1 %
BILIRUB DIRECT SERPL-MCNC: <0.2 MG/DL (ref 0–0.3)
BILIRUB SERPL-MCNC: 0.7 MG/DL
BILIRUB UR QL STRIP: NEGATIVE
BUN SERPL-MCNC: 12.9 MG/DL (ref 8–23)
CALCIUM SERPL-MCNC: 9.5 MG/DL (ref 8.8–10.4)
CHLORIDE SERPL-SCNC: 101 MMOL/L (ref 98–107)
COLOR UR AUTO: YELLOW
CREAT SERPL-MCNC: 0.89 MG/DL (ref 0.67–1.17)
EGFRCR SERPLBLD CKD-EPI 2021: >90 ML/MIN/1.73M2
EOSINOPHIL # BLD AUTO: 0.2 10E3/UL (ref 0–0.7)
EOSINOPHIL NFR BLD AUTO: 2 %
ERYTHROCYTE [DISTWIDTH] IN BLOOD BY AUTOMATED COUNT: 12.4 % (ref 10–15)
GLUCOSE SERPL-MCNC: 139 MG/DL (ref 70–99)
GLUCOSE UR STRIP-MCNC: NEGATIVE MG/DL
HCO3 SERPL-SCNC: 26 MMOL/L (ref 22–29)
HCT VFR BLD AUTO: 42 % (ref 40–53)
HGB BLD-MCNC: 14.7 G/DL (ref 13.3–17.7)
HGB UR QL STRIP: NEGATIVE
IMM GRANULOCYTES # BLD: 0 10E3/UL
IMM GRANULOCYTES NFR BLD: 0 %
KETONES UR STRIP-MCNC: NEGATIVE MG/DL
LEUKOCYTE ESTERASE UR QL STRIP: ABNORMAL
LYMPHOCYTES # BLD AUTO: 1.6 10E3/UL (ref 0.8–5.3)
LYMPHOCYTES NFR BLD AUTO: 19 %
MCH RBC QN AUTO: 32.5 PG (ref 26.5–33)
MCHC RBC AUTO-ENTMCNC: 35 G/DL (ref 31.5–36.5)
MCV RBC AUTO: 93 FL (ref 78–100)
MONOCYTES # BLD AUTO: 0.7 10E3/UL (ref 0–1.3)
MONOCYTES NFR BLD AUTO: 8 %
MUCOUS THREADS #/AREA URNS LPF: PRESENT /LPF
NEUTROPHILS # BLD AUTO: 6.4 10E3/UL (ref 1.6–8.3)
NEUTROPHILS NFR BLD AUTO: 72 %
NITRATE UR QL: NEGATIVE
NRBC # BLD AUTO: 0 10E3/UL
NRBC BLD AUTO-RTO: 0 /100
PH UR STRIP: 7.5 [PH] (ref 5–9)
PLATELET # BLD AUTO: 163 10E3/UL (ref 150–450)
POTASSIUM SERPL-SCNC: 4.1 MMOL/L (ref 3.4–5.3)
PROT SERPL-MCNC: 6.6 G/DL (ref 6.4–8.3)
RBC # BLD AUTO: 4.53 10E6/UL (ref 4.4–5.9)
RBC URINE: 2 /HPF
SODIUM SERPL-SCNC: 137 MMOL/L (ref 135–145)
SP GR UR STRIP: 1.02 (ref 1–1.03)
UROBILINOGEN UR STRIP-MCNC: 2 MG/DL
WBC # BLD AUTO: 8.9 10E3/UL (ref 4–11)
WBC URINE: 8 /HPF
YEAST #/AREA URNS HPF: ABNORMAL /HPF

## 2024-10-10 PROCEDURE — 36415 COLL VENOUS BLD VENIPUNCTURE: CPT | Mod: ZL

## 2024-10-10 PROCEDURE — 81001 URINALYSIS AUTO W/SCOPE: CPT | Mod: ZL

## 2024-10-10 PROCEDURE — 87086 URINE CULTURE/COLONY COUNT: CPT | Mod: ZL

## 2024-10-10 PROCEDURE — 85018 HEMOGLOBIN: CPT | Mod: ZL

## 2024-10-10 PROCEDURE — 82248 BILIRUBIN DIRECT: CPT | Mod: ZL

## 2024-10-10 PROCEDURE — 82310 ASSAY OF CALCIUM: CPT | Mod: ZL

## 2024-10-10 PROCEDURE — 85004 AUTOMATED DIFF WBC COUNT: CPT | Mod: ZL

## 2024-10-12 LAB — BACTERIA UR CULT: NO GROWTH

## 2024-10-14 NOTE — PROGRESS NOTES
document embedded image                                   [facility]                                                                                                                                         Patient Name: Philipp Jefferson   [facility] [address]    YOB: 1955   [place] [address]   MR Number: [id number]   Phone: [phone]  PCP:            Appointment Date: 10/01/24  Visit Provider: Subhash Sanchez MD    cc: ~    ENT Progress Note        Intake  Visit Reasons: Possible cancer spot on throat/ self referral    HPI  History of Present Illness  Chief complaint:  Oral lesion     History  The patient is a 62-year-old man who underwent a liver transplant at the [facility] in 2018.  There for a recent follow-up visit and was noted to have an abnormality of his oropharynx and was asked to return to see 1 of the ENT doctors for evaluation.  He decide to make a visit ear with us instead.  He is a former smoker.  He has no symptoms in regards to his oral cavity or throat.    Exam   Oral cavity oropharynx-free of mucosal lesion or inflammation   Neck-no palpable masses or adenopathy   Nasal-no obstructing lesions or purulence   Indirect laryngoscopy-he has a small erythematous area at the front of his left true vocal cord that looks like an area of hemorrhage.  I can not see a definite neoplasm.  His larynx is neurologically intact.  The remainder of his hypopharynx is clear  Head and neck integument-unremarkable   General-the patient appears well and in no distress  Neuro-there are no focal cranial nerve deficits    A&P  Assessment & Plan  (1) Lesion of vocal cord:         Status: Acute        Code(s):  J38.3 - Other diseases of vocal cords  The spot in his left anterior vocal cord looks like a small area of hemorrhage possibly from coughing.  It does not look like a neoplasm.  Given his immunosuppressed status and smoking history, I would like him to make sure this does not change.   He was reassured that I see no evidence of an oral lesion of any kind.                Subhash Sanchez MD    Filed: 10/02/24 1640      <Electronically signed by Subhash Sanchez MD> 10/02/24 1640

## 2024-10-24 ENCOUNTER — TELEPHONE (OUTPATIENT)
Dept: INTERNAL MEDICINE | Facility: OTHER | Age: 69
End: 2024-10-24
Payer: COMMERCIAL

## 2024-10-24 DIAGNOSIS — N20.1 URETEROPELVIC JUNCTION CALCULUS: Primary | ICD-10-CM

## 2024-10-24 NOTE — TELEPHONE ENCOUNTER
Patient is requesting a urology referral for a kidney stone. Patient was told that they cannot be see until March. Would a referral for urology speed up the process of getting patient seen by Dr. Hernandez? If so, referral T'ed up.     Felicity Ponce LPN on 10/24/2024 at 1:06 PM   Ext. [phone]

## 2024-10-24 NOTE — TELEPHONE ENCOUNTER
The patient needs a referral for urology to go for a kidney stone.  Here could not get in until March.   Please advise.

## 2024-10-25 ENCOUNTER — OFFICE VISIT (OUTPATIENT)
Dept: UROLOGY | Facility: OTHER | Age: 69
End: 2024-10-25
Attending: UROLOGY
Payer: MEDICARE

## 2024-10-25 ENCOUNTER — LAB (OUTPATIENT)
Dept: LAB | Facility: OTHER | Age: 69
End: 2024-10-25
Attending: UROLOGY
Payer: COMMERCIAL

## 2024-10-25 ENCOUNTER — PREP FOR PROCEDURE (OUTPATIENT)
Dept: UROLOGY | Facility: OTHER | Age: 69
End: 2024-10-25

## 2024-10-25 VITALS — DIASTOLIC BLOOD PRESSURE: 78 MMHG | HEART RATE: 100 BPM | OXYGEN SATURATION: 95 % | SYSTOLIC BLOOD PRESSURE: 128 MMHG

## 2024-10-25 DIAGNOSIS — N20.1 URETERIC STONE: Primary | ICD-10-CM

## 2024-10-25 DIAGNOSIS — N20.1 URETERAL STONE: Primary | ICD-10-CM

## 2024-10-25 DIAGNOSIS — N20.1 LEFT URETERAL STONE: Primary | ICD-10-CM

## 2024-10-25 DIAGNOSIS — N20.1 URETERIC STONE: ICD-10-CM

## 2024-10-25 LAB
ALBUMIN UR-MCNC: 30 MG/DL
APPEARANCE UR: CLEAR
BILIRUB UR QL STRIP: NEGATIVE
COLOR UR AUTO: YELLOW
GLUCOSE UR STRIP-MCNC: NEGATIVE MG/DL
HGB UR QL STRIP: NEGATIVE
HYALINE CASTS: 3 /LPF
KETONES UR STRIP-MCNC: NEGATIVE MG/DL
LEUKOCYTE ESTERASE UR QL STRIP: ABNORMAL
NITRATE UR QL: NEGATIVE
PH UR STRIP: 7.5 [PH] (ref 4.7–8)
RBC URINE: 5 /HPF
SP GR UR STRIP: 1.02 (ref 1–1.03)
SQUAMOUS EPITHELIAL: <1 /HPF
UROBILINOGEN UR STRIP-MCNC: 3 MG/DL
WBC URINE: 11 /HPF

## 2024-10-25 PROCEDURE — 99204 OFFICE O/P NEW MOD 45 MIN: CPT | Performed by: UROLOGY

## 2024-10-25 PROCEDURE — 87086 URINE CULTURE/COLONY COUNT: CPT | Mod: ZL

## 2024-10-25 PROCEDURE — G0463 HOSPITAL OUTPT CLINIC VISIT: HCPCS

## 2024-10-25 PROCEDURE — 81001 URINALYSIS AUTO W/SCOPE: CPT | Mod: ZL

## 2024-10-25 ASSESSMENT — PAIN SCALES - GENERAL: PAINLEVEL_OUTOF10: NO PAIN (0)

## 2024-10-25 NOTE — PROGRESS NOTES
HPI:    Philipp Jefferson is a 69 year old gentleman seen today for evaluation of a ureteral stone.  He is seen at the request of Dr. Vinson.    His history is remarkable for a liver transplantation.  This was done in 2018.  At his recent follow-up at [facility] in September, he was found to have some blood in his urine.  He ended up getting a CT scan which shows a large proximal left ureteral stone.    The patient endorses some prior stone episodes.  These passed easily.  He has never had surgery for a stone.  He has never had metabolic evaluation for stone.    He is presently having some left-sided back pain, but attributed this to his regular back pain.  He has not had visible blood in the urine.  He has not had bladder changes.        ROS:   10 point review of systems performed.  Pertinent positives and negatives in HPI.    Past Medical History:   Diagnosis Date    Chondromalacia of left knee     5/16/2016    Hepatic encephalopathy (H) 03/28/2014    Nontraumatic subarachnoid hemorrhage (H)     10/03/2007,Right frontal temporal subarachnoid bleed secondary to trauma    Other specified diseases of liver (CODE)     possible abdominal mass       Past Surgical History:   Procedure Laterality Date    COLONOSCOPY  10/15/2009    normal, 10 year follow up    COLONOSCOPY  01/2019    at [facility]    COLONOSCOPY  09/14/2022    Jeremy Lock MD - [place]    EXCISE CYST GENERIC (LOCATION)      Under his right eyelid ~10/04/07Craniotomy for subarachnoid bleed at [facility]    IR PARACENTESIS  11/2/2012    IR PARACENTESIS  9/18/2017    IR PARACENTESIS  9/29/2017    IR PARACENTESIS  10/24/2017    IR PARACENTESIS  11/2/2017    IR PARACENTESIS  11/14/2017    IR PARACENTESIS  11/21/2017    IR PARACENTESIS  11/29/2017    IR PARACENTESIS  12/8/2017    IR PARACENTESIS  12/19/2017    IR PARACENTESIS  12/29/2017    IR PARACENTESIS  1/9/2018    IR PARACENTESIS  1/16/2018    IR PARACENTESIS  1/23/2018    IR PARACENTESIS  1/30/2018    IR  PARACENTESIS  2018    OTHER SURGICAL HISTORY      10/04/07,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,[facility]OTOMY[facility]       Family History   Problem Relation Age of Onset    Myocardial Infarction Brother         Social History     Tobacco Use    Smoking status: Some Days     Current packs/day: 0.00     Average packs/day: 1 pack/day for 52.0 years (52.0 ttl pk-yrs)     Types: Cigars, Cigarettes     Start date:      Last attempt to quit: 2018     Years since quittin.8    Smokeless tobacco: Never   Vaping Use    Vaping status: Never Used   Substance Use Topics    Alcohol use: No     Alcohol/week: 0.0 standard drinks of alcohol    Drug use: No        Current Outpatient Medications   Medication Sig Dispense Refill    amLODIPine (NORVASC) 10 MG tablet Take 1 tablet (10 mg) by mouth daily 90 tablet 4    ASPIRIN PO Take 81 mg by mouth daily      calcium carbonate 500 mg, elemental, 1250 (500 Ca) MG tablet chewable Take 200 mg by mouth      cycloSPORINE modified (GENERIC EQUIVALENT) 25 MG capsule Take 3 capsules (75 mg) by mouth daily -- [facility] 1 capsule 0    furosemide (LASIX) 40 MG tablet Take 1 tablet (40 mg) by mouth 2 times daily 180 tablet 4    lisinopril (ZESTRIL) 10 MG tablet Take 1 tablet (10 mg) by mouth daily 90 tablet 4    multivitamin, therapeutic (THERA-VIT) TABS tablet Take 1 tablet by mouth daily      mycophenolate (GENERIC EQUIVALENT) 500 MG tablet Take 1 tablet (500 mg) by mouth 2 times daily -- Rx from [facility] 1 tablet 0    pramipexole (MIRAPEX) 0.5 MG tablet Take 2 tablets (1 mg) by mouth at bedtime -- Dose Increase 2024 180 tablet 4     No current facility-administered medications for this visit.       Exam:  /78 (BP Location: Right arm, Patient Position: Sitting)   Pulse 100   SpO2 95%   Gen: Pleasant, NAD  HEENT: WNL  Resp: nonlabored on RA  Back: + left CVA tenderness    Results/Data:  Today's uA is  pending    Color Urine (no units)   Date Value   10/10/2024 Yellow     Appearance Urine (no units)   Date Value   10/10/2024 Slightly Cloudy (A)     Glucose Urine (mg/dL)   Date Value   10/10/2024 Negative     Bilirubin Urine (no units)   Date Value   10/10/2024 Negative     Ketones Urine (mg/dL)   Date Value   10/10/2024 Negative     Specific Gravity Urine (no units)   Date Value   10/10/2024 1.016     pH Urine (no units)   Date Value   10/10/2024 7.5     Protein Albumin Urine (mg/dL)   Date Value   10/10/2024 10 (A)     Nitrite Urine (no units)   Date Value   10/10/2024 Negative     Leukocyte Esterase Urine (no units)   Date Value   10/10/2024 Moderate (A)        Imaging:  10/9/24 CT A/P images and report reviewed with patient.  IMPRESSION: Large calculus at the ureteropelvic junction on the left  with mild left-sided hydronephrosis.     Assessment and Plan:    Philipp Jefferson is a 69 year old gentleman seen today for the following:    Left ureteral stone    We reviewed his imaging together, and discussed that given the large size of the stone, spontaneous passage was less likely.      Presently, he is minimally symptomatic and attributes his discomfort to his baseline back pain.  Based on exam and imaging, I do suspect that at least some of his pain is related to the stone.    We discussed a cystoscopy, left retrograde pyelogram, ureteroscopy, laser lithotripsy, stone extraction, stent placement.  We discussed the possibility that this would need to be a staged procedure based on the proximal location and large size of the stone.  We reviewed ureteral stents, and the associated symptoms to include urgency, frequency, dysuria, gross hematuria.  He is in agreement that this should be a priority and he should have it treated.    He is medically complex, and will need to have preoperative clearance.  We will work to get him in with his primary physician and ideally on the operating room schedule for next week.

## 2024-10-27 LAB — BACTERIA UR CULT: NORMAL

## 2024-10-28 ENCOUNTER — ANESTHESIA EVENT (OUTPATIENT)
Dept: SURGERY | Facility: HOSPITAL | Age: 69
End: 2024-10-28
Payer: MEDICARE

## 2024-10-28 ENCOUNTER — OFFICE VISIT (OUTPATIENT)
Dept: FAMILY MEDICINE | Facility: OTHER | Age: 69
End: 2024-10-28
Attending: NURSE PRACTITIONER
Payer: COMMERCIAL

## 2024-10-28 VITALS
DIASTOLIC BLOOD PRESSURE: 81 MMHG | HEART RATE: 110 BPM | SYSTOLIC BLOOD PRESSURE: 134 MMHG | HEIGHT: 68 IN | BODY MASS INDEX: 43.95 KG/M2 | WEIGHT: 290 LBS | RESPIRATION RATE: 18 BRPM | OXYGEN SATURATION: 97 % | TEMPERATURE: 97.5 F

## 2024-10-28 DIAGNOSIS — J44.9 MILD CHRONIC OBSTRUCTIVE PULMONARY DISEASE (H): ICD-10-CM

## 2024-10-28 DIAGNOSIS — E66.01 CLASS 3 SEVERE OBESITY DUE TO EXCESS CALORIES WITH SERIOUS COMORBIDITY AND BODY MASS INDEX (BMI) OF 40.0 TO 44.9 IN ADULT (H): ICD-10-CM

## 2024-10-28 DIAGNOSIS — E78.2 MIXED HYPERLIPIDEMIA: ICD-10-CM

## 2024-10-28 DIAGNOSIS — E66.813 CLASS 3 SEVERE OBESITY DUE TO EXCESS CALORIES WITH SERIOUS COMORBIDITY AND BODY MASS INDEX (BMI) OF 40.0 TO 44.9 IN ADULT (H): ICD-10-CM

## 2024-10-28 DIAGNOSIS — N18.1 STAGE 1 CHRONIC KIDNEY DISEASE: ICD-10-CM

## 2024-10-28 DIAGNOSIS — I10 BENIGN ESSENTIAL HYPERTENSION: ICD-10-CM

## 2024-10-28 DIAGNOSIS — Z01.818 PREOP GENERAL PHYSICAL EXAM: Primary | ICD-10-CM

## 2024-10-28 DIAGNOSIS — Z71.85 VACCINE COUNSELING: ICD-10-CM

## 2024-10-28 DIAGNOSIS — R31.9 HEMATURIA, UNSPECIFIED TYPE: ICD-10-CM

## 2024-10-28 DIAGNOSIS — R60.0 BILATERAL LOWER EXTREMITY EDEMA: ICD-10-CM

## 2024-10-28 DIAGNOSIS — N20.1 URETERAL STONE: ICD-10-CM

## 2024-10-28 DIAGNOSIS — Z86.19 HISTORY OF HEPATITIS C: ICD-10-CM

## 2024-10-28 DIAGNOSIS — F10.21 HISTORY OF ALCOHOLISM (H): ICD-10-CM

## 2024-10-28 DIAGNOSIS — Z94.4 HISTORY OF TRANSPLANTATION, LIVER (H): ICD-10-CM

## 2024-10-28 LAB
ALBUMIN SERPL BCG-MCNC: 4.1 G/DL (ref 3.5–5.2)
ALP SERPL-CCNC: 86 U/L (ref 40–150)
ALT SERPL W P-5'-P-CCNC: 16 U/L (ref 0–70)
ANION GAP SERPL CALCULATED.3IONS-SCNC: 8 MMOL/L (ref 7–15)
AST SERPL W P-5'-P-CCNC: 23 U/L (ref 0–45)
BILIRUB SERPL-MCNC: 1 MG/DL
BUN SERPL-MCNC: 12 MG/DL (ref 8–23)
CALCIUM SERPL-MCNC: 9 MG/DL (ref 8.8–10.4)
CHLORIDE SERPL-SCNC: 105 MMOL/L (ref 98–107)
CREAT SERPL-MCNC: 1 MG/DL (ref 0.67–1.17)
EGFRCR SERPLBLD CKD-EPI 2021: 82 ML/MIN/1.73M2
GLUCOSE SERPL-MCNC: 155 MG/DL (ref 70–99)
HCO3 SERPL-SCNC: 29 MMOL/L (ref 22–29)
HGB BLD-MCNC: 14.9 G/DL (ref 13.3–17.7)
POTASSIUM SERPL-SCNC: 3.6 MMOL/L (ref 3.4–5.3)
PROT SERPL-MCNC: 6.7 G/DL (ref 6.4–8.3)
SODIUM SERPL-SCNC: 142 MMOL/L (ref 135–145)

## 2024-10-28 PROCEDURE — 36415 COLL VENOUS BLD VENIPUNCTURE: CPT | Mod: ZL | Performed by: NURSE PRACTITIONER

## 2024-10-28 PROCEDURE — 93010 ELECTROCARDIOGRAM REPORT: CPT | Performed by: INTERNAL MEDICINE

## 2024-10-28 PROCEDURE — 85018 HEMOGLOBIN: CPT | Mod: ZL | Performed by: NURSE PRACTITIONER

## 2024-10-28 PROCEDURE — G0463 HOSPITAL OUTPT CLINIC VISIT: HCPCS

## 2024-10-28 PROCEDURE — 80053 COMPREHEN METABOLIC PANEL: CPT | Mod: ZL | Performed by: NURSE PRACTITIONER

## 2024-10-28 PROCEDURE — 99214 OFFICE O/P EST MOD 30 MIN: CPT | Performed by: NURSE PRACTITIONER

## 2024-10-28 PROCEDURE — 93005 ELECTROCARDIOGRAM TRACING: CPT | Performed by: NURSE PRACTITIONER

## 2024-10-28 RX ORDER — ONDANSETRON 2 MG/ML
4 INJECTION INTRAMUSCULAR; INTRAVENOUS EVERY 30 MIN PRN
Status: CANCELLED | OUTPATIENT
Start: 2024-10-28

## 2024-10-28 RX ORDER — NALOXONE HYDROCHLORIDE 0.4 MG/ML
0.1 INJECTION, SOLUTION INTRAMUSCULAR; INTRAVENOUS; SUBCUTANEOUS
Status: CANCELLED | OUTPATIENT
Start: 2024-10-28

## 2024-10-28 RX ORDER — ONDANSETRON 4 MG/1
4 TABLET, ORALLY DISINTEGRATING ORAL EVERY 30 MIN PRN
Status: CANCELLED | OUTPATIENT
Start: 2024-10-28

## 2024-10-28 RX ORDER — DEXAMETHASONE SODIUM PHOSPHATE 10 MG/ML
4 INJECTION, SOLUTION INTRAMUSCULAR; INTRAVENOUS
Status: CANCELLED | OUTPATIENT
Start: 2024-10-28

## 2024-10-28 ASSESSMENT — COPD QUESTIONNAIRES
CAT_SEVERITY: MILD
COPD: 1

## 2024-10-28 ASSESSMENT — LIFESTYLE VARIABLES: TOBACCO_USE: 1

## 2024-10-28 ASSESSMENT — PAIN SCALES - GENERAL: PAINLEVEL_OUTOF10: NO PAIN (0)

## 2024-10-28 NOTE — PROGRESS NOTES
Preoperative Evaluation  [facility]  [address]  [place] [address]  Phone: [phone]  Fax: [phone]  Primary Provider: Ramon Vinson MD  Pre-op Performing Provider: Dia Stafford NP  Oct 28, 2024             10/28/2024   Surgical Information   What procedure is being done? kidneystone remove    Facility or Hospital where procedure/surgery will be performed: [facility]    Who is doing the procedure / surgery? unknown    Date of surgery / procedure: un known oct 30 2024    Time of surgery / procedure: unknown    Where do you plan to recover after surgery? at home with family        Patient-reported     Fax number for surgical facility: Note does not need to be faxed, will be available electronically in Epic.    Assessment & Plan     The proposed surgical procedure is considered INTERMEDIATE risk.    Problem List Items Addressed This Visit       History of hepatitis C    Benign essential hypertension    Bilateral lower extremity edema    Mixed hyperlipidemia    Mild chronic obstructive pulmonary disease (H)    History of alcoholism (H)    Hematuria    Stage 1 chronic kidney disease    Class 3 severe obesity due to excess calories with serious comorbidity and body mass index (BMI) of 40.0 to 44.9 in adult (H)     Other Visit Diagnoses       Preop general physical exam    -  Primary    Relevant Orders    EKG 12-lead, tracing only (Same Day) (Completed)    Hemoglobin (Completed)    Comprehensive Metabolic Panel (Completed)    Ureteral stone        History of transplantation, liver (H)        Vaccine counseling                 1. Preop general physical exam (Primary)  - EKG 12-lead, tracing only (Same Day)  - Hemoglobin  - Comprehensive Metabolic Panel  Stable labs today; EKG without evidence of ischemia and patient is asymptomatic of cardiac symptoms.    2. Ureteral stone  3. Hematuria, unspecified type  No gross hematuria; microscopic hematuria has been noted and led  to further evaluation and workup that revealed large ureteral stone, indication for upcoming surgical procedure    4. Stage 1 chronic kidney disease  Creatinine of 1.0 and GFR of 82 today; stable.     5. History of transplantation, liver (H)  Liver transplantation in year 2018.   -chronically immune suppressed. Follows with transplant center.   -continue anti rejection medications mycophenolate 500 mg tablet bid and cyclosporine 75 mg daily    6. History of alcoholism (H)  7. History of hepatitis C  Cured; patient is status post liver transplantation in year ** and no longer consumes alcohol.     8. Bilateral lower extremity edema  Continue furosemide    9. Benign essential hypertension  Fairly well controlled on lisinopril 10 mg daily, furosemide 40 mg daily, and amlodipine 10 mg daily. Continue these medications as they are prescribed.     10. Mixed hyperlipidemia  Stable; chronic. Not on statin therapy. Last checked 1 year ago 9/11/2023.     11. Class 3 severe obesity due to excess calories with serious comorbidity and body mass index (BMI) of 40.0 to 44.9 in adult (H)  Bmi is quite elevated at 44, recommend diet/lifestyle changes.     12. Mild chronic obstructive pulmonary disease (H)  Stable; well controlled without use of inhalers     13. Vaccine counseling  Encouraged influenza and prevnar 20 immunizations; patient plans to get these once upcoming procedure is complete due to history of feeling unwell for a week after immunizations.        - No identified additional risk factors other than previously addressed    Antiplatelet or Anticoagulation Medication Instructions   - Patient is on no antiplatelet or anticoagulation medications.   - aspirin: Discontinue aspirin 7-10 days prior to procedure to reduce bleeding risk. It should be resumed postoperatively.     Additional Medication Instructions   - ACE/ARB: DO NOT TAKE on day of surgery (minimum 11 hours for general anesthesia).   - Calcium Channel Blockers:  May be continued on the day of surgery.   - Diuretics: May continue due to heart failure.   - Herbal medications and vitamins: DO NOT TAKE 14 days prior to surgery.     HOLD your lisinopril the day of surgery. You MAY take it the day BEFORE surgery in the morning.   Continue with your anti rejection medications.     Recommendation  Approval given to proceed with proposed procedure, without further diagnostic evaluation.    Rachele Segal is a 69 year old, presenting for the following:  Pre-Op Exam          10/28/2024    10:20 AM   Additional Questions   Roomed by Beverley CORBETT   Accompanied by self     HPI related to upcoming procedure: Philipp presents today   for a pre-operative evaluation prior to upcoming procedure for ureteral stone removal. CT scan of abdomen and pelvis at [facility] on 10/9/2024 revealsed an 11 mm stone in the left ureteropelvic junction with dilatation of the left renal collecting system. Urology was consulted.    Per office note from Dr. Billy, urologist, at [facility] on 10/25/2024, plan is to perform cystoscopy, left retrograde pyelogram, ureteroscopy, laser lithotripsy, stone extraction, and stent placement.     No cardiac history. No chest pain at rest or with exertion. He feels well overall. No shortness of breath or cough. No history of diabetes.     The pain he is experiencing is stable; mostly with activity and he attributes this to baseline chronic back pain.     No bladder concerns other than stream is a little slower than usual, but still urinating ok. No gross hematuria. There is microscopic hematuria.           10/28/2024   Pre-Op Questionnaire   Have you ever had a heart attack or stroke? No    Have you ever had surgery on your heart or blood vessels, such as a stent placement, a coronary artery bypass, or surgery on an artery in your head, neck, heart, or legs? No    Do you have chest pain with activity? No    Do you have a history of heart failure? No    Do you currently  have a cold, bronchitis or symptoms of other infection? No    Do you have a cough, shortness of breath, or wheezing? No    Do you or anyone in your family have previous history of blood clots? No    Do you or does anyone in your family have a serious bleeding problem such as prolonged bleeding following surgeries or cuts? No    Have you ever had problems with anemia or been told to take iron pills? No    Have you had any abnormal blood loss such as black, tarry or bloody stools? No    Have you ever had a blood transfusion? No    Are you willing to have a blood transfusion if it is medically needed before, during, or after your surgery? Yes    Have you or any of your relatives ever had problems with anesthesia? No    Do you have sleep apnea, excessive snoring or daytime drowsiness? No    Do you have any artifical heart valves or other implanted medical devices like a pacemaker, defibrillator, or continuous glucose monitor? No    Do you have artificial joints? No    Are you allergic to latex? No        Patient-reported     Health Care Directive  Patient does not have a Health Care Directive: Discussed advance care planning with patient; however, patient declined at this time.    Preoperative Review of    reviewed - no record of controlled substances prescribed.          Patient Active Problem List    Diagnosis Date Noted    Chronic midline low back pain without sciatica 05/29/2024     Priority: Medium    Sacroiliitis (H) 05/29/2024     Priority: Medium    Arthritis of knee 05/29/2024     Priority: Medium    Class 3 severe obesity due to excess calories with serious comorbidity and body mass index (BMI) of 40.0 to 44.9 in adult (H) 05/29/2024     Priority: Medium    Impaired gait and mobility 05/29/2024     Priority: Medium    Hepatocellular carcinoma (H) 02/11/2024     Priority: Medium    Primary osteoarthritis of right knee 02/08/2024     Priority: Medium    Chronic pain of both knees 02/08/2024     Priority:  Medium    Immunocompromised state due to drug therapy (H) 02/08/2024     Priority: Medium    History of alcoholism (H) 02/08/2024     Priority: Medium    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     Priority: Medium    Mild chronic obstructive pulmonary disease (H) 03/02/2022     Priority: Medium    Hematuria 06/22/2021     Priority: Medium    Proteinuria 06/22/2021     Priority: Medium    Stage 1 chronic kidney disease 06/22/2021     Priority: Medium    Bilateral lower extremity edema 02/12/2021     Priority: Medium    Mixed hyperlipidemia 02/12/2021     Priority: Medium    Elevated random blood glucose level 02/12/2021     Priority: Medium    Restless leg syndrome 08/29/2019     Priority: Medium    Status post liver transplantation (H) 07/18/2018     Priority: Medium    Immunosuppressed status (H) 06/22/2018     Priority: Medium    Post-procedural fever 06/22/2018     Priority: Medium    Anemia due to acute blood loss 06/18/2018     Priority: Medium    Hepatorenal syndrome (H) 04/24/2018     Priority: Medium    History of hepatitis C 02/07/2018     Priority: Medium     Overview:   Followed at [facility]      Benign essential hypertension 02/07/2018     Priority: Medium    Retroperitoneal lymphadenopathy 01/18/2018     Priority: Medium    Bilateral primary osteoarthritis of knee 10/24/2017     Priority: Medium    High alpha fetoprotein (AFP) tumor marker 10/24/2017     Priority: Medium    Thrombocytopenia (H) 10/24/2017     Priority: Medium    Fat pad syndrome 09/07/2017     Priority: Medium    Arteriosclerosis of coronary artery 06/29/2017     Priority: Medium    Alcoholic cirrhosis (H) 06/02/2017     Priority: Medium    Tear of meniscus of knee 05/17/2017     Priority: Medium    Chondromalacia of left knee 05/16/2016     Priority: Medium    Bilateral carpal tunnel syndrome 05/12/2016     Priority: Medium    Portal hypertension with esophageal varices (H) 01/13/2016     Priority: Medium    Mallet finger 12/13/2012      Priority: Medium    Insomnia 07/27/2012     Priority: Medium    Benign neoplasm of colon 10/15/2009     Priority: Medium     Overview:   Colonoscopy 10/15/09 Next due 2019      Cavernous hemangioma 08/07/2008     Priority: Medium    Chronic hepatitis C virus infection (H) 01/02/2008     Priority: Medium      Past Medical History:   Diagnosis Date    Chondromalacia of left knee     5/16/2016    Hepatic encephalopathy (H) 03/28/2014    Nontraumatic subarachnoid hemorrhage (H)     10/03/2007,Right frontal temporal subarachnoid bleed secondary to trauma    Other specified diseases of liver (CODE)     possible abdominal mass     Past Surgical History:   Procedure Laterality Date    COLONOSCOPY  10/15/2009    normal, 10 year follow up    COLONOSCOPY  01/2019    at [facility]    COLONOSCOPY  09/14/2022    Jeremy Lock MD - [place]    EXCISE CYST GENERIC (LOCATION)      Under his right eyelid ~10/04/07Craniotomy for subarachnoid bleed at [facility]    IR PARACENTESIS  11/2/2012    IR PARACENTESIS  9/18/2017    IR PARACENTESIS  9/29/2017    IR PARACENTESIS  10/24/2017    IR PARACENTESIS  11/2/2017    IR PARACENTESIS  11/14/2017    IR PARACENTESIS  11/21/2017    IR PARACENTESIS  11/29/2017    IR PARACENTESIS  12/8/2017    IR PARACENTESIS  12/19/2017    IR PARACENTESIS  12/29/2017    IR PARACENTESIS  1/9/2018    IR PARACENTESIS  1/16/2018    IR PARACENTESIS  1/23/2018    IR PARACENTESIS  1/30/2018    IR PARACENTESIS  2/6/2018    OTHER SURGICAL HISTORY      10/04/07,[id number][facility]     Current Outpatient Medications   Medication Sig Dispense Refill    amLODIPine (NORVASC) 10 MG tablet Take 1 tablet (10 mg) by mouth daily 90 tablet 4    ASPIRIN PO Take 81 mg by mouth daily      calcium carbonate 500 mg, elemental, 1250 (500 Ca) MG tablet chewable Take 200 mg by mouth      cycloSPORINE modified (GENERIC EQUIVALENT) 25 MG capsule Take 3 capsules (75  "mg) by mouth daily -- [facility] (Patient taking differently: Take 75 mg by mouth 2 times daily. -- Rx from [facility]) 1 capsule 0    furosemide (LASIX) 40 MG tablet Take 1 tablet (40 mg) by mouth 2 times daily 180 tablet 4    lisinopril (ZESTRIL) 10 MG tablet Take 1 tablet (10 mg) by mouth daily 90 tablet 4    multivitamin, therapeutic (THERA-VIT) TABS tablet Take 1 tablet by mouth daily      mycophenolate (GENERIC EQUIVALENT) 500 MG tablet Take 1 tablet (500 mg) by mouth 2 times daily -- Rx from [facility] 1 tablet 0    pramipexole (MIRAPEX) 0.5 MG tablet Take 2 tablets (1 mg) by mouth at bedtime -- Dose Increase 2024 180 tablet 4       Allergies   Allergen Reactions    Tacrolimus Other (See Comments) and Unknown     Thrombotic microangiopathy  Thrombotic microangiopathy        Social History     Tobacco Use    Smoking status: Some Days     Current packs/day: 0.00     Average packs/day: 1 pack/day for 52.0 years (52.0 ttl pk-yrs)     Types: Cigarettes     Start date:      Last attempt to quit: 2018     Years since quittin.8    Smokeless tobacco: Never   Substance Use Topics    Alcohol use: No     Alcohol/week: 0.0 standard drinks of alcohol       History   Drug Use No           Review of Systems  Constitutional, neuro, ENT, endocrine, pulmonary, cardiac, gastrointestinal, genitourinary, musculoskeletal, integument and psychiatric systems are negative, except as otherwise noted.    Objective    /81 (BP Location: Right arm, Patient Position: Sitting, Cuff Size: Adult Regular)   Pulse 110   Temp 97.5  F (36.4  C) (Tympanic)   Resp 18   Ht 1.727 m (5' 8\")   Wt 131.5 kg (290 lb)   SpO2 97%   BMI 44.09 kg/m     Estimated body mass index is 44.09 kg/m  as calculated from the following:    Height as of this encounter: 1.727 m (5' 8\").    Weight as of this encounter: 131.5 kg (290 lb).  Physical Exam  Vitals and nursing note reviewed.   Constitutional:       General: " He is not in acute distress.     Appearance: Normal appearance. He is normal weight. He is not ill-appearing or toxic-appearing.   HENT:      Mouth/Throat:      Mouth: Mucous membranes are moist.      Comments: Missing teeth; dental extractions   Eyes:      Extraocular Movements: Extraocular movements intact.      Pupils: Pupils are equal, round, and reactive to light.   Cardiovascular:      Rate and Rhythm: Normal rate and regular rhythm.      Heart sounds: Normal heart sounds. No murmur heard.  Pulmonary:      Effort: Pulmonary effort is normal. No respiratory distress.      Breath sounds: Normal breath sounds. No stridor. No wheezing, rhonchi or rales.   Chest:      Chest wall: No tenderness.   Abdominal:      General: There is distension.      Comments: Large abdomen   Musculoskeletal:         General: Normal range of motion.   Skin:     General: Skin is warm and dry.   Neurological:      General: No focal deficit present.      Mental Status: He is alert and oriented to person, place, and time.   Psychiatric:         Mood and Affect: Mood normal.         Recent Labs   Lab Test 10/10/24  0927 06/10/24  1156   HGB 14.7 15.9    176    141   POTASSIUM 4.1 3.5   CR 0.89 0.86        Diagnostics     EKG: today's EKG shows sinus rhythm with marked sinus arrhythmia with short UT, no evidence of ischemia- otherwise normal ECG, unchanged from previous tracings, EKG on file from year 2017 shows a shortned UT as well.      Results for orders placed or performed in visit on 10/28/24   Hemoglobin     Status: Normal   Result Value Ref Range    Hemoglobin 14.9 13.3 - 17.7 g/dL   Comprehensive Metabolic Panel     Status: Abnormal   Result Value Ref Range    Sodium 142 135 - 145 mmol/L    Potassium 3.6 3.4 - 5.3 mmol/L    Carbon Dioxide (CO2) 29 22 - 29 mmol/L    Anion Gap 8 7 - 15 mmol/L    Urea Nitrogen 12.0 8.0 - 23.0 mg/dL    Creatinine 1.00 0.67 - 1.17 mg/dL    GFR Estimate 82 >60 mL/min/1.73m2    Calcium 9.0 8.8  - 10.4 mg/dL    Chloride 105 98 - 107 mmol/L    Glucose 155 (H) 70 - 99 mg/dL    Alkaline Phosphatase 86 40 - 150 U/L    AST 23 0 - 45 U/L    ALT 16 0 - 70 U/L    Protein Total 6.7 6.4 - 8.3 g/dL    Albumin 4.1 3.5 - 5.2 g/dL    Bilirubin Total 1.0 <=1.2 mg/dL   EKG 12-lead, tracing only (Same Day)     Status: None (Preliminary result)   Result Value Ref Range    Systolic Blood Pressure  mmHg    Diastolic Blood Pressure  mmHg    Ventricular Rate 90 BPM    Atrial Rate 90 BPM    IA Interval 108 ms    QRS Duration 74 ms     ms    QTc 459 ms    P Axis 57 degrees    R AXIS 5 degrees    T Axis 16 degrees    Interpretation ECG       Sinus rhythm with marked sinus arrhythmia with short IA  Otherwise normal ECG  No previous ECGs available           Revised Cardiac Risk Index (RCRI)  The patient has the following serious cardiovascular risks for perioperative complications:   - No serious cardiac risks = 0 points     RCRI Interpretation: 0 points: Class I (very low risk - 0.4% complication rate)         Signed Electronically by: Dia Stafford NP  A copy of this evaluation report is provided to the requesting physician.

## 2024-10-28 NOTE — ANESTHESIA PREPROCEDURE EVALUATION
Anesthesia Pre-Procedure Evaluation    Patient: Philipp Jefferson   MRN: [id number] : 1955        Procedure : Procedure(s):  CYSTOURETEROSCOPY, WITH LITHOTRIPSY USING LASER AND URETERAL STENT INSERTION          Past Medical History:   Diagnosis Date     Chondromalacia of left knee     2016     Hepatic encephalopathy (H) 2014     Nontraumatic subarachnoid hemorrhage (H)     10/03/2007,Right frontal temporal subarachnoid bleed secondary to trauma     Other specified diseases of liver (CODE)     possible abdominal mass      Past Surgical History:   Procedure Laterality Date     COLONOSCOPY  10/15/2009    normal, 10 year follow up     COLONOSCOPY  2019    at [facility]     COLONOSCOPY  2022    Jeremy Lock MD - [place]     EXCISE CYST GENERIC (LOCATION)      Under his right eyelid ~10/04/07Craniotomy for subarachnoid bleed at [facility]     IR PARACENTESIS  2012     IR PARACENTESIS  2017     IR PARACENTESIS  2017     IR PARACENTESIS  10/24/2017     IR PARACENTESIS  2017     IR PARACENTESIS  2017     IR PARACENTESIS  2017     IR PARACENTESIS  2017     IR PARACENTESIS  2017     IR PARACENTESIS  2017     IR PARACENTESIS  2017     IR PARACENTESIS  2018     IR PARACENTESIS  2018     IR PARACENTESIS  2018     IR PARACENTESIS  2018     IR PARACENTESIS  2018     OTHER SURGICAL HISTORY      10/04/07,[id number],[facility]      Allergies   Allergen Reactions     Tacrolimus Other (See Comments) and Unknown     Thrombotic microangiopathy  Thrombotic microangiopathy      Social History     Tobacco Use     Smoking status: Some Days     Current packs/day: 0.00     Average packs/day: 1 pack/day for 52.0 years (52.0 ttl pk-yrs)     Types: Cigarettes     Start date:      Last attempt to quit: 2018     Years since quittin.8     Smokeless tobacco: Never    Substance Use Topics     Alcohol use: No     Alcohol/week: 0.0 standard drinks of alcohol      Wt Readings from Last 1 Encounters:   10/28/24 131.5 kg (290 lb)        Anesthesia Evaluation   Pt has had prior anesthetic.     No history of anesthetic complications       ROS/MED HX  ENT/Pulmonary: Comment: Saw ENT Sanchez Brenton 10/2/24  The spot in his left anterior vocal cord looks like a small area of hemorrhage possibly from coughing.  It does not look like a neoplasm.  Given his immunosuppressed status and smoking history, I would like him to make sure this does not change.  He was reassured that I see no evidence of an oral lesion of any kind.                (+)     LUDIN risk factors, snores loudly, hypertension, obese,    vocal cord abnormalities -     tobacco use, Current use, 0.3 packs/day, 52  Pack-Year Hx,  patient smoked within 24 hours,     mild,  COPD (well controlled without use of inhalers (10/28/24)),              Neurologic: Comment: RLS  insomnia      Cardiovascular: Comment: Bilateral lower extremity edema    Per 9/25/23 [employer] notes: Pre transplant he had a positive dobutamine stress echo but underwent a coronary angiography showing abnormalities no greater than 20% in the obstructed blood vessels. He has not been followed with Cardiology since.    (+) Dyslipidemia hypertension-range: 134/81 hp/ -  CAD -  - -   Taking blood thinners Pt has received instructions: Instructions Given to patient: asa - stop 7-10 days prior.                            Previous cardiac testing   Echo: Date: Results:    Stress Test:  Date: 2017 Results:  Abnormal - angio completed after  ECG Reviewed:  Date: 10/28/24 Results:  Sinus rhythm with marked sinus arrhythmia with short VT  Otherwise normal ECG  No previous ECGs available    Cath:  Date: 2017 Results:  1. Moderate amount of calcified plaque in the left main and left anterior descending artery with focal mild (25-49% luminal diameter) stenoses in the distal  left main and proximal LAD. Mild to moderate (approaching 50% luminal diameter) stenosis mid LAD. Additional scattered calcified plaque in the left circumflex and right main coronary arteries with minimal focal stenoses.  2. CAD-RADS 2.  3.  Normal variant isolated origin of the conus artery from the right sinus of Valsalva. Otherwise conventional origin and course of the coronary arteries.  4.  Partially visualized upper quadrant abdominal ascites with cirrhotic appearing liver morphology and paraesophageal varices.        METS/Exercise Tolerance: 3 - Able to walk 1-2 blocks without stopping    Hematologic: Comments: thrombocytopenia    (+)      anemia,          Musculoskeletal: Comment: Impaired gait and mobility  (+)  arthritis,             GI/Hepatic: Comment: S/p liver transplant - follows with transplant center  Remains on mycophenolate and cyclosporine-had his annual follow up this year and nothing changed     (+) GERD, Asymptomatic on medication,   hepatitis resolved      hepatitis (cured (per 10/28/24 note)) type C, liver disease (post transplant 2018),       Renal/Genitourinary: Comment:  CT scan of abdomen and pelvis at [facility] on 10/9/2024 revealsed an 11 mm stone in the left ureteropelvic junction with dilatation of the left renal collecting system.    (+) renal disease (stage 1), type: CRI, Pt does not require dialysis,    Nephrolithiasis ,       Endo:     (+)               Obesity,       Psychiatric/Substance Use:     (+)   alcohol abuse      Infectious Disease:  - neg infectious disease ROS     Malignancy:  - neg malignancy ROS     Other:  - neg other ROS    (+)  , H/O Chronic Pain: back pain and knees.,         Physical Exam    Airway        Mallampati: III   TM distance: > 3 FB   Neck ROM: full   Mouth opening: > 3 cm    Respiratory Devices and Support         Dental       (+) Edentulous      Cardiovascular   cardiovascular exam normal          Pulmonary           breath sounds clear to auscultation  "  (+) decreased breath sounds       OUTSIDE LABS:  CBC:   Lab Results   Component Value Date    WBC 8.9 10/10/2024    WBC 6.4 06/10/2024    HGB 14.9 10/28/2024    HGB 14.7 10/10/2024    HCT 42.0 10/10/2024    HCT 43.8 06/10/2024     10/10/2024     06/10/2024     BMP:   Lab Results   Component Value Date     10/28/2024     10/10/2024    POTASSIUM 3.6 10/28/2024    POTASSIUM 4.1 10/10/2024    CHLORIDE 105 10/28/2024    CHLORIDE 101 10/10/2024    CO2 29 10/28/2024    CO2 26 10/10/2024    BUN 12.0 10/28/2024    BUN 12.9 10/10/2024    CR 1.00 10/28/2024    CR 0.89 10/10/2024     (H) 10/28/2024     (H) 10/10/2024     COAGS:   Lab Results   Component Value Date    PTT 31 09/29/2017    INR 1.62 (H) 05/15/2018     POC: No results found for: \"BGM\", \"HCG\", \"HCGS\"  HEPATIC:   Lab Results   Component Value Date    ALBUMIN 4.1 10/28/2024    PROTTOTAL 6.7 10/28/2024    ALT 16 10/28/2024    AST 23 10/28/2024    ALKPHOS 86 10/28/2024    BILITOTAL 1.0 10/28/2024    BILIDIRECT 0.45 (H) 09/18/2017     OTHER:   Lab Results   Component Value Date    A1C 5.3 04/06/2023    JODIE 9.0 10/28/2024    PHOS 3.0 06/23/2014    LIPASE 33.3 09/29/2017    TSH 1.78 04/06/2023       Anesthesia Plan    ASA Status:  3    NPO Status:  NPO Appropriate    Anesthesia Type: General.     - Airway: ETT   Induction: Intravenous, Propofol.   Maintenance: Balanced.        Consents    Anesthesia Plan(s) and associated risks, benefits, and realistic alternatives discussed. Questions answered and patient/representative(s) expressed understanding.     - Discussed: Risks, Benefits and Alternatives for BOTH SEDATION and the PROCEDURE were discussed     - Discussed with:  Patient      - Extended Intubation/Ventilatory Support Discussed: No.      - Patient is DNR/DNI Status: No     Use of blood products discussed: No .     Postoperative Care    Pain management: IV analgesics.   PONV prophylaxis: Ondansetron (or other 5HT-3), " "Dexamethasone or Solumedrol     Comments:    Other Comments: Discussed risks and benefits with patient for general anesthesia including sore throat, nausea, vomiting, aspiration, dental damage, loss of airway, CV complications, stroke, MI, death. Pt wishes to proceed. Reviewed 10/28 DK Stafford 10/28/24 RUPERTO Alfaro CRNA    I have reviewed the pertinent notes and labs in the chart from the past 30 days and (re)examined the patient.  Any updates or changes from those notes are reflected in this note.               # Hypertension: Noted on problem list         # Severe Obesity: Estimated body mass index is 44.09 kg/m  as calculated from the following:    Height as of 10/28/24: 1.727 m (5' 8\").    Weight as of 10/28/24: 131.5 kg (290 lb).             "

## 2024-10-28 NOTE — NURSING NOTE
"Chief Complaint   Patient presents with    Pre-Op Exam   Patient presents to the clinic today for his pre-op visit.     Initial /81 (BP Location: Right arm, Patient Position: Sitting, Cuff Size: Adult Regular)   Pulse 110   Temp 97.5  F (36.4  C) (Tympanic)   Resp 18   Ht 1.727 m (5' 8\")   Wt 131.5 kg (290 lb)   SpO2 97%   BMI 44.09 kg/m   Estimated body mass index is 44.09 kg/m  as calculated from the following:    Height as of this encounter: 1.727 m (5' 8\").    Weight as of this encounter: 131.5 kg (290 lb).  Medication Review: complete    The next two questions are to help us understand your food security.  If you are feeling you need any assistance in this area, we have resources available to support you today.          5/29/2024   SDOH- Food Insecurity   Within the past 12 months, did you worry that your food would run out before you got money to buy more? N    Within the past 12 months, did the food you bought just not last and you didn t have money to get more? N        Patient-reported         Health Care Directive:  Patient does not have a Health Care Directive: Discussed advance care planning with patient; however, patient declined at this time.    Beverley Sheets      "

## 2024-10-28 NOTE — PATIENT INSTRUCTIONS
How to Take Your Medication Before Surgery  Preoperative Medication Instructions   Antiplatelet or Anticoagulation Medication Instructions   - Patient is on no antiplatelet or anticoagulation medications.   - aspirin: Discontinue aspirin 7-10 days prior to procedure to reduce bleeding risk. It should be resumed postoperatively.     Additional Medication Instructions   - ACE/ARB: DO NOT TAKE on day of surgery (minimum 11 hours for general anesthesia).   - Calcium Channel Blockers: May be continued on the day of surgery.   - Diuretics: May continue due to heart failure.   - Herbal medications and vitamins: DO NOT TAKE 14 days prior to surgery.     HOLD your lisinopril the day of surgery. You MAY take it the day BEFORE surgery in the morning.   Continue with your anti rejection medications.     Patient Education   Preparing for Your Surgery  For Adults  Getting started  In most cases, a nurse will call to review your health history and instructions. They will give you an arrival time based on your scheduled surgery time. Please be ready to share:  Your doctor's clinic name and phone number  Your medical, surgical, and anesthesia history  A list of allergies and sensitivities  A list of medicines, including herbal treatments and over-the-counter drugs  Whether the patient has a legal guardian (ask how to send us the papers in advance)  Note: You may not receive a call if you were seen at our PAC (Preoperative Assessment Center).  Please tell us if you're pregnant--or if there's any chance you might be pregnant. Some surgeries may injure a fetus (unborn baby), so they require a pregnancy test. Surgeries that are safe for a fetus don't always need a test, and you can choose whether to have one.   Preparing for surgery  Within 10 to 30 days of surgery: Have a pre-op exam (sometimes called an H&P, or History and Physical). This can be done at a clinic or pre-operative center.  If you're having a , you may not need  this exam. Talk to your care team.  At your pre-op exam, talk to your care team about all medicines you take. (This includes CBD oil and any drugs, such as THC, marijuana, and other forms of cannabis.) If you need to stop any medicine before surgery, ask when to start taking it again.  This is for your safety. Many medicines and drugs can make you bleed too much during surgery. Some change how well surgery (anesthesia) drugs work.  Call your insurance company to let them know you're having surgery. (If you don't have insurance, call [phone].)  Call your clinic if there's any change in your health. This includes a scrape or scratch near the surgery site, or any signs of a cold (sore throat, runny nose, cough, rash, fever).  Eating and drinking guidelines  For your safety: Unless your surgeon tells you otherwise, follow the guidelines below.  Eat and drink as normal until 8 hours before you arrive for surgery. After that, no food or milk. You can spit out gum when you arrive.  Drink clear liquids until 2 hours before you arrive. These are liquids you can see through, like water, Gatorade, and Propel Water. They also include plain black coffee and tea (no cream or milk).  No alcohol for 24 hours before you arrive. The night before surgery, stop any drinks that contain THC.  If your care team tells you to take medicine on the morning of surgery, it's okay to take it with a sip of water. No other medicines or drugs are allowed (including CBD oil)--follow your care team's instructions.  If you have questions the day of surgery, call your hospital or surgery center.   Preventing infection  Shower or bathe the night before and the morning of surgery. Follow the instructions your clinic gave you. (If no instructions, use regular soap.)  Don't shave or clip hair near your surgery site. We'll remove the hair if needed.  Don't smoke or vape the morning of surgery. No chewing tobacco for 6 hours before you arrive. A  nicotine patch is okay. You may spit out nicotine gum when you arrive.  For some surgeries, the surgeon will tell you to fully quit smoking and nicotine.  We will make every effort to keep you safe from infection. We will:  Clean our hands often with soap and water (or an alcohol-based hand rub).  Clean the skin at your surgery site with a special soap that kills germs.  Give you a special gown to keep you warm. (Cold raises the risk of infection.)  Wear hair covers, masks, gowns, and gloves during surgery.  Give antibiotic medicine, if prescribed. Not all surgeries need this medicine.  What to bring on the day of surgery  Photo ID and insurance card  Copy of your health care directive, if you have one  Glasses and hearing aids (bring cases)  You can't wear contacts during surgery  Inhaler and eye drops, if you use them (tell us about these when you arrive)  CPAP machine or breathing device, if you use them  A few personal items, if spending the night  If you have . . .  A pacemaker, ICD (cardiac defibrillator), or other implant: Bring the ID card.  An implanted stimulator: Bring the remote control.  A legal guardian: Bring a copy of the certified (court-stamped) guardianship papers.  Please remove any jewelry, including body piercings. Leave jewelry and other valuables at home.  If you're going home the day of surgery  You must have a responsible adult drive you home. They should stay with you overnight as well.  If you don't have someone to stay with you, and you aren't safe to go home alone, we may keep you overnight. Insurance often won't pay for this.  After surgery  If it's hard to control your pain or you need more pain medicine, please call your surgeon's office.  Questions?   If you have any questions for your care team, list them here:    ____________________________________________________________________________________________________________________________________________________________________________________________________________________________________________________________  For informational purposes only. Not to replace the advice of your health care provider. Copyright   2003, 2019 [facility]. All rights reserved. Clinically reviewed by Carter Osman MD. [employer] [id number] - REV 08/24.

## 2024-10-29 LAB
ATRIAL RATE - MUSE: 90 BPM
DIASTOLIC BLOOD PRESSURE - MUSE: NORMAL MMHG
INTERPRETATION ECG - MUSE: NORMAL
P AXIS - MUSE: 57 DEGREES
PR INTERVAL - MUSE: 108 MS
QRS DURATION - MUSE: 74 MS
QT - MUSE: 376 MS
QTC - MUSE: 459 MS
R AXIS - MUSE: 5 DEGREES
SYSTOLIC BLOOD PRESSURE - MUSE: NORMAL MMHG
T AXIS - MUSE: 16 DEGREES
VENTRICULAR RATE- MUSE: 90 BPM

## 2024-10-30 ENCOUNTER — HOSPITAL ENCOUNTER (OUTPATIENT)
Facility: HOSPITAL | Age: 69
Discharge: HOME | End: 2024-10-30
Attending: UROLOGY | Admitting: UROLOGY
Payer: MEDICARE

## 2024-10-30 ENCOUNTER — ANESTHESIA (OUTPATIENT)
Dept: SURGERY | Facility: HOSPITAL | Age: 69
End: 2024-10-30
Payer: MEDICARE

## 2024-10-30 ENCOUNTER — APPOINTMENT (OUTPATIENT)
Dept: GENERAL RADIOLOGY | Facility: HOSPITAL | Age: 69
End: 2024-10-30
Attending: UROLOGY
Payer: MEDICARE

## 2024-10-30 VITALS
WEIGHT: 292.8 LBS | DIASTOLIC BLOOD PRESSURE: 87 MMHG | SYSTOLIC BLOOD PRESSURE: 152 MMHG | BODY MASS INDEX: 44.38 KG/M2 | OXYGEN SATURATION: 92 % | HEIGHT: 68 IN | RESPIRATION RATE: 20 BRPM | HEART RATE: 97 BPM | TEMPERATURE: 97.7 F

## 2024-10-30 DIAGNOSIS — N20.1 LEFT URETERAL STONE: Primary | ICD-10-CM

## 2024-10-30 PROCEDURE — 250N000009 HC RX 250: Performed by: NURSE ANESTHETIST, CERTIFIED REGISTERED

## 2024-10-30 PROCEDURE — 999N000157 HC STATISTIC RCP TIME EA 10 MIN

## 2024-10-30 PROCEDURE — C1758 CATHETER, URETERAL: HCPCS | Performed by: UROLOGY

## 2024-10-30 PROCEDURE — 52356 CYSTO/URETERO W/LITHOTRIPSY: CPT | Mod: LT | Performed by: UROLOGY

## 2024-10-30 PROCEDURE — 52356 CYSTO/URETERO W/LITHOTRIPSY: CPT | Performed by: NURSE ANESTHETIST, CERTIFIED REGISTERED

## 2024-10-30 PROCEDURE — 370N000017 HC ANESTHESIA TECHNICAL FEE, PER MIN: Performed by: UROLOGY

## 2024-10-30 PROCEDURE — 360N000083 HC SURGERY LEVEL 3 W/ FLUORO, PER MIN: Performed by: UROLOGY

## 2024-10-30 PROCEDURE — 250N000011 HC RX IP 250 OP 636: Performed by: UROLOGY

## 2024-10-30 PROCEDURE — 250N000013 HC RX MED GY IP 250 OP 250 PS 637: Performed by: NURSE ANESTHETIST, CERTIFIED REGISTERED

## 2024-10-30 PROCEDURE — 255N000002 HC RX 255 OP 636: Performed by: UROLOGY

## 2024-10-30 PROCEDURE — 94640 AIRWAY INHALATION TREATMENT: CPT

## 2024-10-30 PROCEDURE — 250N000025 HC SEVOFLURANE, PER MIN: Performed by: UROLOGY

## 2024-10-30 PROCEDURE — 999N000141 HC STATISTIC PRE-PROCEDURE NURSING ASSESSMENT: Performed by: UROLOGY

## 2024-10-30 PROCEDURE — 710N000012 HC RECOVERY PHASE 2, PER MINUTE: Performed by: UROLOGY

## 2024-10-30 PROCEDURE — C2617 STENT, NON-COR, TEM W/O DEL: HCPCS | Performed by: UROLOGY

## 2024-10-30 PROCEDURE — 272N000001 HC OR GENERAL SUPPLY STERILE: Performed by: UROLOGY

## 2024-10-30 PROCEDURE — 999N000179 XR SURGERY CARM FLUORO LESS THAN 5 MIN W STILLS: Mod: TC

## 2024-10-30 PROCEDURE — 710N000010 HC RECOVERY PHASE 1, LEVEL 2, PER MIN: Performed by: UROLOGY

## 2024-10-30 PROCEDURE — 250N000011 HC RX IP 250 OP 636: Performed by: NURSE ANESTHETIST, CERTIFIED REGISTERED

## 2024-10-30 DEVICE — STENT URETERAL SOF-CURL TECOFLEX 6FRX26CM SSC6026: Type: IMPLANTABLE DEVICE | Site: URETHRA | Status: FUNCTIONAL

## 2024-10-30 RX ORDER — SODIUM CHLORIDE, SODIUM LACTATE, POTASSIUM CHLORIDE, CALCIUM CHLORIDE 600; 310; 30; 20 MG/100ML; MG/100ML; MG/100ML; MG/100ML
INJECTION, SOLUTION INTRAVENOUS CONTINUOUS
Status: DISCONTINUED | OUTPATIENT
Start: 2024-10-30 | End: 2024-10-30 | Stop reason: HOSPADM

## 2024-10-30 RX ORDER — TAMSULOSIN HYDROCHLORIDE 0.4 MG/1
0.4 CAPSULE ORAL DAILY
Status: DISCONTINUED | OUTPATIENT
Start: 2024-10-30 | End: 2024-10-30

## 2024-10-30 RX ORDER — ALBUTEROL SULFATE 0.83 MG/ML
2.5 SOLUTION RESPIRATORY (INHALATION) EVERY 4 HOURS PRN
Status: DISCONTINUED | OUTPATIENT
Start: 2024-10-30 | End: 2024-10-30 | Stop reason: HOSPADM

## 2024-10-30 RX ORDER — IOPAMIDOL 612 MG/ML
INJECTION, SOLUTION INTRAVASCULAR PRN
Status: DISCONTINUED | OUTPATIENT
Start: 2024-10-30 | End: 2024-10-30 | Stop reason: HOSPADM

## 2024-10-30 RX ORDER — LABETALOL HYDROCHLORIDE 5 MG/ML
10 INJECTION, SOLUTION INTRAVENOUS
Status: DISCONTINUED | OUTPATIENT
Start: 2024-10-30 | End: 2024-10-30 | Stop reason: HOSPADM

## 2024-10-30 RX ORDER — FENTANYL CITRATE 50 UG/ML
25 INJECTION, SOLUTION INTRAMUSCULAR; INTRAVENOUS EVERY 5 MIN PRN
Status: DISCONTINUED | OUTPATIENT
Start: 2024-10-30 | End: 2024-10-30 | Stop reason: HOSPADM

## 2024-10-30 RX ORDER — FENTANYL CITRATE 50 UG/ML
INJECTION, SOLUTION INTRAMUSCULAR; INTRAVENOUS PRN
Status: DISCONTINUED | OUTPATIENT
Start: 2024-10-30 | End: 2024-10-30

## 2024-10-30 RX ORDER — ONDANSETRON 2 MG/ML
4 INJECTION INTRAMUSCULAR; INTRAVENOUS EVERY 30 MIN PRN
Status: DISCONTINUED | OUTPATIENT
Start: 2024-10-30 | End: 2024-10-30 | Stop reason: HOSPADM

## 2024-10-30 RX ORDER — PROPOFOL 10 MG/ML
INJECTION, EMULSION INTRAVENOUS PRN
Status: DISCONTINUED | OUTPATIENT
Start: 2024-10-30 | End: 2024-10-30

## 2024-10-30 RX ORDER — DEXAMETHASONE SODIUM PHOSPHATE 4 MG/ML
INJECTION, SOLUTION INTRA-ARTICULAR; INTRALESIONAL; INTRAMUSCULAR; INTRAVENOUS; SOFT TISSUE PRN
Status: DISCONTINUED | OUTPATIENT
Start: 2024-10-30 | End: 2024-10-30

## 2024-10-30 RX ORDER — LIDOCAINE 40 MG/G
CREAM TOPICAL
Status: DISCONTINUED | OUTPATIENT
Start: 2024-10-30 | End: 2024-10-30 | Stop reason: HOSPADM

## 2024-10-30 RX ORDER — LIDOCAINE HYDROCHLORIDE 20 MG/ML
INJECTION, SOLUTION INFILTRATION; PERINEURAL PRN
Status: DISCONTINUED | OUTPATIENT
Start: 2024-10-30 | End: 2024-10-30

## 2024-10-30 RX ORDER — NALOXONE HYDROCHLORIDE 0.4 MG/ML
0.1 INJECTION, SOLUTION INTRAMUSCULAR; INTRAVENOUS; SUBCUTANEOUS
Status: DISCONTINUED | OUTPATIENT
Start: 2024-10-30 | End: 2024-10-30 | Stop reason: HOSPADM

## 2024-10-30 RX ORDER — CEFAZOLIN SODIUM/WATER 3 G/30 ML
3 SYRINGE (ML) INTRAVENOUS
Status: COMPLETED | OUTPATIENT
Start: 2024-10-30 | End: 2024-10-30

## 2024-10-30 RX ORDER — OXYCODONE HYDROCHLORIDE 5 MG/1
5 TABLET ORAL EVERY 4 HOURS PRN
Status: DISCONTINUED | OUTPATIENT
Start: 2024-10-30 | End: 2024-10-30 | Stop reason: HOSPADM

## 2024-10-30 RX ORDER — NALOXONE HYDROCHLORIDE 0.4 MG/ML
INJECTION, SOLUTION INTRAMUSCULAR; INTRAVENOUS; SUBCUTANEOUS PRN
Status: DISCONTINUED | OUTPATIENT
Start: 2024-10-30 | End: 2024-10-30

## 2024-10-30 RX ORDER — KETAMINE HYDROCHLORIDE 10 MG/ML
INJECTION INTRAMUSCULAR; INTRAVENOUS PRN
Status: DISCONTINUED | OUTPATIENT
Start: 2024-10-30 | End: 2024-10-30

## 2024-10-30 RX ORDER — ONDANSETRON 4 MG/1
4 TABLET, ORALLY DISINTEGRATING ORAL EVERY 30 MIN PRN
Status: DISCONTINUED | OUTPATIENT
Start: 2024-10-30 | End: 2024-10-30 | Stop reason: HOSPADM

## 2024-10-30 RX ORDER — CEFAZOLIN SODIUM/WATER 3 G/30 ML
3 SYRINGE (ML) INTRAVENOUS SEE ADMIN INSTRUCTIONS
Status: DISCONTINUED | OUTPATIENT
Start: 2024-10-30 | End: 2024-10-30 | Stop reason: HOSPADM

## 2024-10-30 RX ORDER — FENTANYL CITRATE 50 UG/ML
50 INJECTION, SOLUTION INTRAMUSCULAR; INTRAVENOUS EVERY 5 MIN PRN
Status: DISCONTINUED | OUTPATIENT
Start: 2024-10-30 | End: 2024-10-30 | Stop reason: HOSPADM

## 2024-10-30 RX ORDER — HYDROMORPHONE HYDROCHLORIDE 1 MG/ML
0.4 INJECTION, SOLUTION INTRAMUSCULAR; INTRAVENOUS; SUBCUTANEOUS EVERY 5 MIN PRN
Status: DISCONTINUED | OUTPATIENT
Start: 2024-10-30 | End: 2024-10-30 | Stop reason: HOSPADM

## 2024-10-30 RX ORDER — HYDRALAZINE HYDROCHLORIDE 20 MG/ML
2.5-5 INJECTION INTRAMUSCULAR; INTRAVENOUS EVERY 10 MIN PRN
Status: DISCONTINUED | OUTPATIENT
Start: 2024-10-30 | End: 2024-10-30 | Stop reason: HOSPADM

## 2024-10-30 RX ORDER — OXYCODONE HYDROCHLORIDE 5 MG/1
5 TABLET ORAL EVERY 6 HOURS PRN
Qty: 7 TABLET | Refills: 0 | Status: SHIPPED | OUTPATIENT
Start: 2024-10-30 | End: 2024-11-06

## 2024-10-30 RX ORDER — ALBUTEROL SULFATE 90 UG/1
INHALANT RESPIRATORY (INHALATION) PRN
Status: DISCONTINUED | OUTPATIENT
Start: 2024-10-30 | End: 2024-10-30

## 2024-10-30 RX ORDER — HYDROMORPHONE HYDROCHLORIDE 1 MG/ML
0.2 INJECTION, SOLUTION INTRAMUSCULAR; INTRAVENOUS; SUBCUTANEOUS EVERY 5 MIN PRN
Status: DISCONTINUED | OUTPATIENT
Start: 2024-10-30 | End: 2024-10-30 | Stop reason: HOSPADM

## 2024-10-30 RX ORDER — TAMSULOSIN HYDROCHLORIDE 0.4 MG/1
0.4 CAPSULE ORAL DAILY
Qty: 30 CAPSULE | Refills: 0 | Status: SHIPPED | OUTPATIENT
Start: 2024-10-30 | End: 2024-11-29

## 2024-10-30 RX ORDER — ONDANSETRON 2 MG/ML
INJECTION INTRAMUSCULAR; INTRAVENOUS PRN
Status: DISCONTINUED | OUTPATIENT
Start: 2024-10-30 | End: 2024-10-30

## 2024-10-30 RX ORDER — DEXAMETHASONE SODIUM PHOSPHATE 4 MG/ML
4 INJECTION, SOLUTION INTRA-ARTICULAR; INTRALESIONAL; INTRAMUSCULAR; INTRAVENOUS; SOFT TISSUE
Status: DISCONTINUED | OUTPATIENT
Start: 2024-10-30 | End: 2024-10-30 | Stop reason: HOSPADM

## 2024-10-30 RX ADMIN — Medication 3 G: at 12:09

## 2024-10-30 RX ADMIN — Medication 30 MG: at 12:35

## 2024-10-30 RX ADMIN — ONDANSETRON 4 MG: 2 INJECTION INTRAMUSCULAR; INTRAVENOUS at 12:32

## 2024-10-30 RX ADMIN — DEXAMETHASONE SODIUM PHOSPHATE 6 MG: 4 INJECTION, SOLUTION INTRA-ARTICULAR; INTRALESIONAL; INTRAMUSCULAR; INTRAVENOUS; SOFT TISSUE at 12:32

## 2024-10-30 RX ADMIN — NALOXONE HYDROCHLORIDE 200 MCG: 0.4 INJECTION, SOLUTION INTRAMUSCULAR; INTRAVENOUS; SUBCUTANEOUS at 13:08

## 2024-10-30 RX ADMIN — Medication 50 MG: at 12:54

## 2024-10-30 RX ADMIN — ALBUTEROL SULFATE 2.5 MG: 2.5 SOLUTION RESPIRATORY (INHALATION) at 13:40

## 2024-10-30 RX ADMIN — PROPOFOL 300 MG: 10 INJECTION, EMULSION INTRAVENOUS at 12:24

## 2024-10-30 RX ADMIN — Medication 200 MG: at 12:50

## 2024-10-30 RX ADMIN — HYDROMORPHONE HYDROCHLORIDE 0.5 MG: 1 INJECTION, SOLUTION INTRAMUSCULAR; INTRAVENOUS; SUBCUTANEOUS at 12:45

## 2024-10-30 RX ADMIN — ROCURONIUM BROMIDE 45 MG: 10 INJECTION INTRAVENOUS at 12:34

## 2024-10-30 RX ADMIN — LIDOCAINE HYDROCHLORIDE 40 MG: 20 INJECTION, SOLUTION INFILTRATION; PERINEURAL at 12:24

## 2024-10-30 RX ADMIN — Medication 200 MG: at 12:24

## 2024-10-30 RX ADMIN — ALBUTEROL SULFATE 6 PUFF: 90 AEROSOL, METERED RESPIRATORY (INHALATION) at 13:15

## 2024-10-30 RX ADMIN — MIDAZOLAM 2 MG: 1 INJECTION INTRAMUSCULAR; INTRAVENOUS at 12:16

## 2024-10-30 RX ADMIN — FENTANYL CITRATE 100 MCG: 50 INJECTION INTRAMUSCULAR; INTRAVENOUS at 12:24

## 2024-10-30 RX ADMIN — ROCURONIUM BROMIDE 5 MG: 10 INJECTION INTRAVENOUS at 12:24

## 2024-10-30 ASSESSMENT — ACTIVITIES OF DAILY LIVING (ADL)
ADLS_ACUITY_SCORE: 0

## 2024-10-30 NOTE — OR NURSING
"Patient doing better.  Notes he just needs to go outside and \"hawk\" something up.  Patient continues to denies pain; no bleeding or abnormalities to tip of urethra; no string seen.  Patient ready for phase 2.    PACU Respiratory Event Documentation     1) Episodes of Apnea greater than or equal to 10 seconds: 0    2) Bradypnea - less than 8 breaths per minute: 0    3) Pain score on 0 to 10 scale: 0    4) Pain-sedation mismatch (yes or no): no    5) Repeated 02 desaturation less than 90% (yes or no): no    Anesthesia notified? (yes or no): n/a    Albuterol neb given for wheezing and freq. cough    Any of the above events occuring repeatedly in separate 30 minute intervals may be considered recurrent PACU respiratory events.        "

## 2024-10-30 NOTE — OR NURSING
Patient continues to cough non-stop; notes that he feels like he needs to cough something up but cannot; lungs sounds diminished some insp/exp wheezing noted.  RT called for neb treatment.

## 2024-10-30 NOTE — DISCHARGE INSTRUCTIONS
After Anesthesia (Sleep Medicine)  What should I do after anesthesia?  You should rest and relax for the next 24 hours. Avoid risky or difficult (strenuous) activity. A responsible adult should stay with you overnight.  Don't drive or use any heavy equipment for 24 hours. Even if you feel normal, your reactions may be affected by the sleep medicine given to you.  Don't drink alcohol or make any important decisions for 24 hours.  Slowly get back to your regular diet, as you feel able.  How should I expect to feel?  It's normal to feel dizzy, light-headed, or faint for up to a full day after anesthesia or while taking pain medicine. If this happens:   Sit down for a few minutes before standing.  Have someone help you when you get up to walk or use the bathroom.  If you have nausea (feel sick to your stomach) or vomit (throw up):   Drink clear liquids (such as apple juice, ginger ale, broth, or 7UP) until you feel better.  If you feel sick to your stomach, or you keep vomiting for 24 hours, please call the doctor.  What else should I know?  You might have a dry mouth, sore throat, muscle aches, or trouble sleeping. These should go away after 24 hours.  Please contact your doctor if you have any other symptoms that concern you, such as fever, pain, bleeding, fluid drainage, swelling, or headache, or if it's been over 8 to 10 hours and you still aren't able to pee (urinate).  If you have a history of sleep apnea, it's very important to use your CPAP machine for the next 24 hours when you nap or sleep.   For informational purposes only. Not to replace the advice of your health care provider. Copyright   2023 [employer]. All rights reserved. Clinically reviewed by Carter Osman MD. [employer] [id number] - REV 09/23.    Ureteroscopy: What to Expect at Home  Your Recovery  Most people are able to go home the same day of the procedure. But you may need to stay in the hospital. If you do, the stay is usually no  more than 24 to 48 hours.  For several hours after the procedure you may have a burning feeling when you urinate. This feeling should go away within a day. Drinking a lot of water can help. Your doctor also may advise you to take medicine that numbs the burning. This medicine is called phenazopyridine. It is available by prescription and over the counter. Brand names include Pyridium and Uristat.  You may have some blood in your urine for 2 or 3 days.  Your doctor may prescribe an antibiotic for a day or two. This will help prevent an infection.  This care sheet gives you a general idea about how long it will take for you to recover. But each person recovers at a different pace. Follow the steps below to feel better as quickly as possible.  How can you care for yourself at home?  Activity    You can go back to work and other activities the next day.   Diet    Try to drink two 8-ounce glasses of water each hour for 2 hours after the procedure. This may help ease the burning when you urinate.   Medicines    Your doctor will tell you if and when you can restart your medicines. He or she will also give you instructions about taking any new medicines.     If you stopped taking aspirin or some other blood thinner, your doctor will tell you when to start taking it again.     If you take medicine to stop the burning when you urinate, take it exactly as recommended. Be safe with medicines. Call your doctor if you think you are having a problem with your medicine. You will get more details on the specific medicine your doctor recommends.     If your doctor prescribed antibiotics, take them as directed. Do not stop taking them just because you feel better. You need to take the full course of antibiotics.     Ask your doctor if you can take an over-the-counter pain medicine, such as acetaminophen (Tylenol), ibuprofen (Advil, Motrin), or naproxen (Aleve). Read and follow all instructions on the label. Do not take two or more  "pain medicines at the same time unless the doctor told you to. Many pain medicines have acetaminophen, which is Tylenol. Too much acetaminophen (Tylenol) can be harmful.   Heat    Take a warm bath. This may soothe the burning.     You also can hold a warm washcloth over your urethra for comfort. (The urethra is where your urine comes out.)   Follow-up care is a key part of your treatment and safety. Be sure to make and go to all appointments, and call your doctor if you are having problems. It's also a good idea to know your test results and keep a list of the medicines you take.  When should you call for help?   Call 911 anytime you think you may need emergency care. For example, call if:    You passed out (lost consciousness).     You have chest pain, are short of breath, or cough up blood.   Call your doctor now or seek immediate medical care if:    You have pain that does not get better after you take pain medicine.     You have new or more blood clots in your urine. (It is normal for the urine to be pink for a few days.)     You cannot urinate.     You have symptoms of a urinary tract infection. These may include:  Pain or burning when you urinate.  A frequent need to urinate without being able to pass much urine.  Pain in the flank, which is just below the rib cage and above the waist on either side of the back.  Blood in the urine.  A fever.     You are sick to your stomach or cannot drink fluids.     You have signs of a blood clot in your leg (called a deep vein thrombosis), such as:  Pain in the calf, back of the knee, thigh, or groin.  Redness and swelling in your leg.   Watch closely for changes in your health, and be sure to contact your doctor if you are having any problems.  Where can you learn more?  Go to https://www.healthwise.net/patiented  Enter P672 in the search box to learn more about \"Ureteroscopy: What to Expect at Home.\"  Current as of: November 15, 2023  Content Version: 14.2 2024 [employer] " [employer].   Care instructions adapted under license by your healthcare professional. If you have questions about a medical condition or this instruction, always ask your healthcare professional. [employer] disclaims any warranty or liability for your use of this information.  Ureteral Stent Placement: What to Expect at Home  Your Recovery     A ureteral (say \"you-REE-ter-ul\") stent is a thin, hollow tube that is placed in the ureter to help urine pass from the kidney into the bladder. Ureters are the tubes that connect the kidneys to the bladder.  You may have a small amount of blood in your urine for 1 to 3 days after the procedure.  While the stent is in place, you may have to urinate more often, feel a sudden need to urinate, or feel like you can't completely empty your bladder. You may feel some pain when you urinate or do strenuous activity. You also may notice a small amount of blood in your urine after strenuous activities. These side effects usually don't prevent people from doing their normal daily activities.  You may have a thin string coming out of your urethra. Your urethra is the tube that carries urine from your bladder to outside your body. This string is attached to the stent. Try not to pull on the string. It will be used to pull out the stent when you no longer need it.  After the procedure, urine may flow better from your kidneys to your bladder. A ureteral stent may be left in place for several days or for as long as several months. Your doctor will take it out when you no longer need it. Or, in some cases, it may be taken out at home.  This care sheet gives you a general idea about how long it will take for you to recover. But each person recovers at a different pace. Follow the steps below to get better as quickly as possible.  How can you care for yourself at home?  Activity    Rest when you feel tired. Getting enough sleep will help you recover.     Avoid strenuous " activities, such as bicycle riding, jogging, weight lifting, or aerobic exercise, until your doctor says it is okay.     Ask your doctor when you can drive again.     Most people are able to return to work the day after the procedure. If your work requires intense activity, you may feel pain in your kidney area or get tired easily. If this happens, you may need to do less strenuous activities while the stent is in.     Ask your doctor when it is okay for you to have sex.   Diet    You can eat your normal diet. If your stomach is upset, try bland, low-fat foods like plain rice, broiled chicken, toast, and yogurt.     Drink plenty of fluids (unless your doctor tells you not to).   Medicines    Your doctor will tell you if and when you can restart your medicines. You will also get instructions about taking any new medicines.     If you stopped taking aspirin or some other blood thinner, your doctor will tell you when to start taking it again.     Be safe with medicines. Take pain medicines exactly as directed.  If the doctor gave you a prescription medicine for pain, take it as prescribed.  If you are not taking a prescription pain medicine, ask your doctor if you can take an over-the-counter medicine.     If you think your pain medicine is making you sick to your stomach:  Take your medicine after meals (unless your doctor has told you not to).  Ask your doctor for a different pain medicine.     If your doctor prescribed antibiotics, take them as directed. Do not stop taking them just because you feel better. You need to take the full course of antibiotics.   Follow-up care is a key part of your treatment and safety. Be sure to make and go to all appointments, and call your doctor if you are having problems. It's also a good idea to know your test results and keep a list of the medicines you take.  When should you call for help?   Call 911 anytime you think you may need emergency care. For example, call if:    You  passed out (lost consciousness).     You have severe trouble breathing.     You have sudden chest pain and shortness of breath, or you cough up blood.     You have severe belly pain.   Call your doctor now or seek immediate medical care if:    Part or all of the stent comes out of your urethra.     You have pain that does not get better after you take pain medicine.     You have symptoms of a urinary infection. For example:  You have blood or pus in your urine.  You have pain in your back just below your rib cage. This is called flank pain.  You have a fever, chills, or body aches.  It hurts to urinate.  You have groin or belly pain.     You cannot control when you urinate, or you leak urine.   Watch closely for changes in your health, and be sure to contact your doctor if you have any problems.  Current as of: November 15, 2023  Content Version: 14.2    2024 [employer].   Care instructions adapted under license by your healthcare professional. If you have questions about a medical condition or this instruction, always ask your healthcare professional. [employer] disclaims any warranty or liability for your use of this information.    Shock Wave Lithotripsy: What to Expect at Home  Your Recovery     Lithotripsy is a way to treat kidney stones without surgery. It is also called extracorporeal shock wave lithotripsy, or ESWL. This treatment uses sound waves to break kidney stones into tiny pieces. These pieces can then pass out of the body in the urine.  You may have a small amount of blood in your urine after this treatment. Your urine may be slightly pink or reddish. The blood in the urine often goes away after 2 days.  You may have a plastic tube inside one of your ureters. Ureters are the tubes that connect the kidneys to the bladder. The plastic tube is called a stent. It takes urine from your kidney to your bladder. This lets the stone pass more easily. Your doctor may remove the stent  in about a week or two.  This care sheet gives you a general idea about how long it will take for you to recover. But each person recovers at a different pace. Follow the steps below to feel better as quickly as possible.  How can you care for yourself at home?  Activity    Rest as much as you need to after you go home.     You may do your regular activities. But avoid hard exercise or sports for a week. Wait until there is no blood in your urine and the stent is out.   Diet    You can eat your normal diet.     Drink plenty of fluids. If you have kidney, heart, or liver disease and have to limit fluids, talk with your doctor before you increase the amount of fluids you drink.   Medicines    Your doctor will tell you if and when you can restart your medicines. You will also be given instructions about taking any new medicines.     If you stopped taking aspirin or some other blood thinner, your doctor will tell you when to start taking it again.     Be safe with medicines. Read and follow all instructions on the label.  If the doctor gave you a prescription medicine for pain, take it as prescribed.  If you are not taking a prescription pain medicine, ask your doctor if you can take acetaminophen (Tylenol). Do not take ibuprofen (Advil, Motrin) or naproxen (Aleve), or similar medicines unless your doctor tells you to.  Do not take two or more pain medicines at the same time unless the doctor told you to. Many pain medicines have acetaminophen, which is Tylenol. Too much acetaminophen (Tylenol) can be harmful.   Other instructions    Urinate through the strainer the doctor gives you. Save any stone pieces, including those that look like sand or gravel. Take these to your doctor. This will help your doctor find the cause of your stones.   Follow-up care is a key part of your treatment and safety. Be sure to make and go to all appointments, and call your doctor if you are having problems. It's also a good idea to know  "your test results and keep a list of the medicines you take.  When should you call for help?   Call 911 anytime you think you may need emergency care. For example, call if:    You passed out (lost consciousness).     You have chest pain, are short of breath, or cough up blood.   Call your doctor now or seek immediate medical care if:    You have pain that does not get better after you take pain medicine.     You have new or more blood clots in your urine. (It is normal for the urine to be pink for a few days.)     You cannot urinate.     You have symptoms of a urinary tract infection. These may include:  Pain or burning when you urinate.  A frequent need to urinate without being able to pass much urine.  Pain in the flank, which is just below the rib cage and above the waist on either side of the back.  Blood in the urine.  A fever.     You are sick to your stomach or cannot drink fluids.     You have signs of a blood clot in your leg (called a deep vein thrombosis), such as:  Pain in the calf, back of the knee, thigh, or groin.  Redness and swelling in your leg.   Watch closely for any changes in your health, and be sure to contact your doctor if you have any problems.  Where can you learn more?  Go to https://www.[employer].net/patiented  Enter Y960 in the search box to learn more about \"Shock Wave Lithotripsy: What to Expect at Home.\"  Current as of: November 15, 2023  Content Version: 14.2 2024 [facility] [employer].   Care instructions adapted under license by your healthcare professional. If you have questions about a medical condition or this instruction, always ask your healthcare professional. [employer] disclaims any warranty or liability for your use of this information.        "

## 2024-10-30 NOTE — ANESTHESIA CARE TRANSFER NOTE
Patient: Philipp Jefferson    Procedure: Procedure(s):  CYSTOURETEROSCOPY, URETERAL STENT INSERTION       Diagnosis: Ureteral stone [N20.1]  Diagnosis Additional Information: No value filed.    Anesthesia Type:   General     Note:    Oropharynx: spontaneously breathing  Level of Consciousness: drowsy and awake  Oxygen Supplementation: face mask  Level of Supplemental Oxygen (L/min / FiO2): 8  Independent Airway: airway patency satisfactory and stable  Dentition: dentition unchanged  Vital Signs Stable: post-procedure vital signs reviewed and stable  Report to RN Given: handoff report given  Patient transferred to: PACU    Handoff Report: Identifed the Patient, Identified the Reponsible Provider, Reviewed the pertinent medical history, Discussed the surgical course, Reviewed Intra-OP anesthesia mangement and issues during anesthesia, Set expectations for post-procedure period and Allowed opportunity for questions and acknowledgement of understanding  Vitals:  Vitals Value Taken Time   BP     Temp     Pulse 95 10/30/24 1321   Resp 19 10/30/24 1321   SpO2 93 % 10/30/24 1321   Vitals shown include unfiled device data.    Electronically Signed By: RUPERTO Low CRNA  October 30, 2024  1:22 PM

## 2024-10-30 NOTE — OR NURSING
Patient and responsible adult given discharge instructions with no questions regarding instructions. Megha score 20/20. Pain level 0/10.  Discharged from unit via wheelchair. Patient discharged to home with spouse ( Johanny). Johanny also present in room for discharge teaching, and AVS reviewed.

## 2024-10-30 NOTE — ANESTHESIA PROCEDURE NOTES
Airway         Procedure Start/Stop Times: 10/30/2024 12:26 PM  Staff -        CRNA: Daniela Gonzales APRN CRNA       Performed By: CRNAIndications and Patient Condition       Indications for airway management: soham-procedural and airway protection       Induction type:intravenous       Mask difficulty assessment: 2 - vent by mask + OA or adjuvant +/- NMBA    Final Airway Details       Final airway type: endotracheal airway       Successful airway: ETT - single  Endotracheal Airway Details        ETT size (mm): 7.5       Successful intubation technique: video laryngoscopy       VL Blade Size: Dorman 4       Grade View of Cords: 2       Adjucts: stylet       Position: Right       Measured from: lips       Secured at (cm): 23       Bite block used: None    Post intubation assessment        Placement verified by: capnometry, equal breath sounds and chest rise        Number of attempts at approach: 1       Secured with: plastic tape       Ease of procedure: easy       Dentition: Intact and Unchanged    Medication(s) Administered   Medication Administration Time: 10/30/2024 12:26 PM

## 2024-10-30 NOTE — OP NOTE
[facility] Operative Note    Pre-operative diagnosis: Left ureteral stone [N20.1]   Post-operative diagnosis Left ureteral stone   Procedure: Cystoscopy, left retrograde pyelogram, left ureteral stent placement   Surgeon: Esha Billy MD   Assistants(s): none   Estimated blood loss: Minimal    Specimens: None   Findings: Ureter too narrow to perform ureteroscopy.  Stent inserted with plan for delayed ureteroscopy.  Procedure in detail: The patient was identified and greeted in the preoperative holding area.  He was marked and consented for the procedure.  He was brought the operating room and placed supine on the OR table.  General anesthesia was induced.  He was moved to the dorsal lithotomy position and prepped and draped in the standard fashion.  A surgical timeout was performed.  The 22 Indian rigid cystoscope was inserted through the urethra and into the bladder.  The anterior urethra was normal.  The prostatic urethra showed mild trilobar hypertrophy..  Upon entering the bladder, it was surveyed in its entirety with the 30 degree lens.  There was mild trabeculation present.  No stones, tumors, foreign bodies.  Ureteral orifices orthotopic.  The left ureteral orifice was cannulated with a 5 Indian open ended ureteral catheter.  Omnipaque contrast was instilled to perform the retrograde pyelogram.  The ureter was normal in its course and caliber to the level of the proximal ureter where a filling defect was present.  The ureter and renal collecting system had mild to moderate dilation above this level.  A Zamora wire was advanced through the catheter and seen to coil in the renal pelvis.  The catheter was advanced to measure ureteral length.  The catheter and cystoscope were removed  The semirigid ureteroscope was then attempted to be advanced into the ureter with the aid of a second wire.  The caliber of the ureter was too narrow and the scope would not pass beyond about 1 centimeter from the  ureteral hiatus.  The ureteroscope was removed.  The wire was backloaded into the cystoscope.  A 6 Bulgarian by 26 centimeter stent was then advanced over the wire.  The proximal coil was in good position in the renal pelvis and the distal coil was in good position in the bladder.  The bladder was emptied and the cystoscope removed.  The patient was awakened and taken to recovery.    Plan: Discharge home today with stent.  Plan to return to OR in about 2 weeks for ureteroscopy.

## 2024-10-30 NOTE — ANESTHESIA POSTPROCEDURE EVALUATION
Patient: Philipp Jefferson    Procedure: Procedure(s):  CYSTOURETEROSCOPY, URETERAL STENT INSERTION       Anesthesia Type:  General    Note:  Disposition: Outpatient   Postop Pain Control: Uneventful            Sign Out: Well controlled pain   PONV: No   Neuro/Psych: Uneventful            Sign Out: Acceptable/Baseline neuro status   Airway/Respiratory: Uneventful            Sign Out: Acceptable/Baseline resp. status   CV/Hemodynamics: Uneventful            Sign Out: Acceptable CV status; No obvious hypovolemia; No obvious fluid overload   Other NRE: NONE   DID A NON-ROUTINE EVENT OCCUR? No       Last vitals:  Vitals Value Taken Time   /124 10/30/24 1350   Temp 98.1  F (36.7  C) 10/30/24 1350   Pulse 91 10/30/24 1354   Resp 21 10/30/24 1354   SpO2 93 % 10/30/24 1354   Vitals shown include unfiled device data.    Electronically Signed By: RUPERTO Dawson CRNA  October 30, 2024  2:44 PM

## 2024-11-01 ENCOUNTER — PREP FOR PROCEDURE (OUTPATIENT)
Dept: UROLOGY | Facility: OTHER | Age: 69
End: 2024-11-01

## 2024-11-01 DIAGNOSIS — N20.1 URETERAL STONE: Primary | ICD-10-CM

## 2024-11-01 NOTE — PROGRESS NOTES
Scheduled cysto, left ureterscopy, laser lithotripsy, stone extraction, stent exchange on 11/13. Pre-op education and pre-op appointment completed prior to first surgery. Prep for procedure completed with date.   Chapis Bustamante RN

## 2024-11-05 ENCOUNTER — ANESTHESIA EVENT (OUTPATIENT)
Dept: SURGERY | Facility: HOSPITAL | Age: 69
End: 2024-11-05
Payer: MEDICARE

## 2024-11-05 ASSESSMENT — COPD QUESTIONNAIRES
COPD: 1
CAT_SEVERITY: MILD

## 2024-11-05 ASSESSMENT — LIFESTYLE VARIABLES: TOBACCO_USE: 1

## 2024-11-05 NOTE — ANESTHESIA PREPROCEDURE EVALUATION
Anesthesia Pre-Procedure Evaluation    Patient: Philipp Jefferson   MRN: [id number] : 1955        Procedure : Procedure(s):  CYSTOURETEROSCOPY, WITH LITHOTRIPSY USING LASER AND URETERAL STENT INSERTION W/STONE EXTRACTION          Past Medical History:   Diagnosis Date    Chondromalacia of left knee     2016    Hepatic encephalopathy (H) 2014    Nontraumatic subarachnoid hemorrhage (H)     10/03/2007,Right frontal temporal subarachnoid bleed secondary to trauma    Other specified diseases of liver (CODE)     possible abdominal mass      Past Surgical History:   Procedure Laterality Date    COLONOSCOPY  10/15/2009    normal, 10 year follow up    COLONOSCOPY  2019    at [facility]    COLONOSCOPY  2022    Jeremy Lock MD - [place]    EXCISE CYST GENERIC (LOCATION)      Under his right eyelid ~10/04/07Craniotomy for subarachnoid bleed at [facility]    IR PARACENTESIS  2012    IR PARACENTESIS  2017    IR PARACENTESIS  2017    IR PARACENTESIS  10/24/2017    IR PARACENTESIS  2017    IR PARACENTESIS  2017    IR PARACENTESIS  2017    IR PARACENTESIS  2017    IR PARACENTESIS  2017    IR PARACENTESIS  2017    IR PARACENTESIS  2017    IR PARACENTESIS  2018    IR PARACENTESIS  2018    IR PARACENTESIS  2018    IR PARACENTESIS  2018    IR PARACENTESIS  2018    LASER HOLMIUM LITHOTRIPSY URETER(S), INSERT STENT, COMBINED Left 10/30/2024    Procedure: CYSTOURETEROSCOPY, URETERAL STENT INSERTION;  Surgeon: Esha Billy MD;  Location: [facility]    OTHER SURGICAL HISTORY      10/04/07,[id number],[facility]OTOMY[facility]      Allergies   Allergen Reactions    Tacrolimus Other (See Comments) and Unknown     Thrombotic microangiopathy  Thrombotic microangiopathy      Social History     Tobacco Use    Smoking status: Some Days     Current packs/day: 0.00     Average  packs/day: 1 pack/day for 52.0 years (52.0 ttl pk-yrs)     Types: Cigarettes     Start date:      Last attempt to quit: 2018     Years since quittin.8    Smokeless tobacco: Never   Substance Use Topics    Alcohol use: No     Alcohol/week: 0.0 standard drinks of alcohol      Wt Readings from Last 1 Encounters:   10/30/24 132.8 kg (292 lb 12.8 oz)        Anesthesia Evaluation   Pt has had prior anesthetic. Type: General.    No history of anesthetic complications       ROS/MED HX  ENT/Pulmonary: Comment: Saw ENT [facility] [place] 10/2/24  The spot in his left anterior vocal cord looks like a small area of hemorrhage possibly from coughing.  It does not look like a neoplasm.  Given his immunosuppressed status and smoking history, I would like him to make sure this does not change.  He was reassured that I see no evidence of an oral lesion of any kind.                (+)     LUDIN risk factors, snores loudly, hypertension, obese,    vocal cord abnormalities -     tobacco use, Current use, 0.3 packs/day, 52  Pack-Year Hx,  patient smoked within 24 hours,     mild,  COPD (well controlled without use of inhalers (10/28/24)),              Neurologic: Comment: RLS  insomnia      Cardiovascular: Comment: Bilateral lower extremity edema    Per 23 [place] notes: Pre transplant he had a positive dobutamine stress echo but underwent a coronary angiography showing abnormalities no greater than 20% in the obstructed blood vessels. He has not been followed with Cardiology since.    (+) Dyslipidemia hypertension-range: 132/80 this am/ -  CAD -  - -   Taking blood thinners Pt has received instructions: Instructions Given to patient: asa - stop 7 days ago.                            Previous cardiac testing   Echo: Date: Results:    Stress Test:  Date:  Results:  Abnormal - angio completed after  ECG Reviewed:  Date: 10/28/24 Results:  Sinus rhythm with marked sinus arrhythmia with short VT  Otherwise normal ECG  No  previous ECGs available    Cath:  Date: 2017 Results:  1. Moderate amount of calcified plaque in the left main and left anterior descending artery with focal mild (25-49% luminal diameter) stenoses in the distal left main and proximal LAD. Mild to moderate (approaching 50% luminal diameter) stenosis mid LAD. Additional scattered calcified plaque in the left circumflex and right main coronary arteries with minimal focal stenoses.  2. CAD-RADS 2.  3.  Normal variant isolated origin of the conus artery from the right sinus of Valsalva. Otherwise conventional origin and course of the coronary arteries.  4.  Partially visualized upper quadrant abdominal ascites with cirrhotic appearing liver morphology and paraesophageal varices.        METS/Exercise Tolerance: 3 - Able to walk 1-2 blocks without stopping    Hematologic: Comments: thrombocytopenia    (+)      anemia,          Musculoskeletal: Comment: Impaired gait and mobility  (+)  arthritis,             GI/Hepatic: Comment: S/p liver transplant - follows with transplant center  Remains on mycophenolate and cyclosporine-had his annual follow up this year and nothing changed     (+) GERD, Asymptomatic on medication,   hepatitis resolved      hepatitis (cured (per 10/28/24 note)) type C, liver disease (post transplant 2018),       Renal/Genitourinary: Comment:  CT scan of abdomen and pelvis at [facility] on 10/9/2024 revealsed an 11 mm stone in the left ureteropelvic junction with dilatation of the left renal collecting system.    10/30/24 ureteral stent placement    (+) renal disease (stage 1), type: CRI, Pt does not require dialysis,    Nephrolithiasis ,       Endo:     (+)               Obesity,       Psychiatric/Substance Use:     (+)   alcohol abuse      Infectious Disease:  - neg infectious disease ROS     Malignancy:  - neg malignancy ROS     Other:  - neg other ROS    (+)  , H/O Chronic Pain (back pain and knees),         Physical Exam    Airway        Mallampati: III  "  TM distance: > 3 FB   Neck ROM: full   Mouth opening: > 3 cm    Respiratory Devices and Support         Dental       (+) Edentulous      Cardiovascular          Rhythm and rate: regular and tachycardia     Pulmonary   pulmonary exam normal        breath sounds clear to auscultation           OUTSIDE LABS:  CBC:   Lab Results   Component Value Date    WBC 8.9 10/10/2024    WBC 6.4 06/10/2024    HGB 14.9 10/28/2024    HGB 14.7 10/10/2024    HCT 42.0 10/10/2024    HCT 43.8 06/10/2024     10/10/2024     06/10/2024     BMP:   Lab Results   Component Value Date     10/28/2024     10/10/2024    POTASSIUM 3.6 10/28/2024    POTASSIUM 4.1 10/10/2024    CHLORIDE 105 10/28/2024    CHLORIDE 101 10/10/2024    CO2 29 10/28/2024    CO2 26 10/10/2024    BUN 12.0 10/28/2024    BUN 12.9 10/10/2024    CR 1.00 10/28/2024    CR 0.89 10/10/2024     (H) 10/28/2024     (H) 10/10/2024     COAGS:   Lab Results   Component Value Date    PTT 31 09/29/2017    INR 1.62 (H) 05/15/2018     POC: No results found for: \"BGM\", \"HCG\", \"HCGS\"  HEPATIC:   Lab Results   Component Value Date    ALBUMIN 4.1 10/28/2024    PROTTOTAL 6.7 10/28/2024    ALT 16 10/28/2024    AST 23 10/28/2024    ALKPHOS 86 10/28/2024    BILITOTAL 1.0 10/28/2024    BILIDIRECT 0.45 (H) 09/18/2017     OTHER:   Lab Results   Component Value Date    A1C 5.3 04/06/2023    JODIE 9.0 10/28/2024    PHOS 3.0 06/23/2014    LIPASE 33.3 09/29/2017    TSH 1.78 04/06/2023       Anesthesia Plan    ASA Status:  4    NPO Status:  NPO Appropriate (black coffee at 0630)    Anesthesia Type: General.     - Airway: ETT   Induction: Intravenous, Propofol.   Maintenance: Balanced.        Consents    Anesthesia Plan(s) and associated risks, benefits, and realistic alternatives discussed. Questions answered and patient/representative(s) expressed understanding.     - Discussed: Risks, Benefits and Alternatives for BOTH SEDATION and the PROCEDURE were discussed     - " "Discussed with:  Patient      - Extended Intubation/Ventilatory Support Discussed: No.      - Patient is DNR/DNI Status: No     Use of blood products discussed: No .     Postoperative Care    Pain management: IV analgesics, Multi-modal analgesia.   PONV prophylaxis: Ondansetron (or other 5HT-3)     Comments:    Other Comments: HP 10/28 JENNA Stafford  Discussed risks and benefits with patient for general anesthesia including sore throat, nausea, vomiting, aspiration, dental damage, loss of airway, CV complications, stroke, MI, death. Pt wishes to proceed.            Mily Russ, RUPERTO CNP    I have reviewed the pertinent notes and labs in the chart from the past 30 days. Any updates or changes from those notes are reflected in this note.               # Hypertension: Noted on problem list           # Severe Obesity: Estimated body mass index is 44.52 kg/m  as calculated from the following:    Height as of 10/30/24: 1.727 m (5' 8\").    Weight as of 10/30/24: 132.8 kg (292 lb 12.8 oz).             "

## 2024-11-13 ENCOUNTER — HOSPITAL ENCOUNTER (OUTPATIENT)
Facility: HOSPITAL | Age: 69
Discharge: HOME | End: 2024-11-13
Attending: UROLOGY | Admitting: UROLOGY
Payer: MEDICARE

## 2024-11-13 ENCOUNTER — APPOINTMENT (OUTPATIENT)
Dept: GENERAL RADIOLOGY | Facility: HOSPITAL | Age: 69
End: 2024-11-13
Attending: UROLOGY
Payer: MEDICARE

## 2024-11-13 ENCOUNTER — ANESTHESIA (OUTPATIENT)
Dept: SURGERY | Facility: HOSPITAL | Age: 69
End: 2024-11-13
Payer: MEDICARE

## 2024-11-13 VITALS
TEMPERATURE: 98 F | DIASTOLIC BLOOD PRESSURE: 67 MMHG | HEIGHT: 68 IN | BODY MASS INDEX: 44.19 KG/M2 | RESPIRATION RATE: 20 BRPM | HEART RATE: 89 BPM | SYSTOLIC BLOOD PRESSURE: 108 MMHG | OXYGEN SATURATION: 93 % | WEIGHT: 291.6 LBS

## 2024-11-13 DIAGNOSIS — N20.1 LEFT URETERAL STONE: Primary | ICD-10-CM

## 2024-11-13 PROCEDURE — 272N000001 HC OR GENERAL SUPPLY STERILE: Performed by: UROLOGY

## 2024-11-13 PROCEDURE — C1758 CATHETER, URETERAL: HCPCS | Performed by: UROLOGY

## 2024-11-13 PROCEDURE — 258N000003 HC RX IP 258 OP 636: Performed by: NURSE PRACTITIONER

## 2024-11-13 PROCEDURE — 250N000025 HC SEVOFLURANE, PER MIN: Performed by: UROLOGY

## 2024-11-13 PROCEDURE — 250N000011 HC RX IP 250 OP 636: Performed by: NURSE ANESTHETIST, CERTIFIED REGISTERED

## 2024-11-13 PROCEDURE — 258N000003 HC RX IP 258 OP 636: Performed by: NURSE ANESTHETIST, CERTIFIED REGISTERED

## 2024-11-13 PROCEDURE — 710N000010 HC RECOVERY PHASE 1, LEVEL 2, PER MIN: Performed by: UROLOGY

## 2024-11-13 PROCEDURE — 250N000009 HC RX 250: Performed by: UROLOGY

## 2024-11-13 PROCEDURE — 999N000141 HC STATISTIC PRE-PROCEDURE NURSING ASSESSMENT: Performed by: UROLOGY

## 2024-11-13 PROCEDURE — 360N000083 HC SURGERY LEVEL 3 W/ FLUORO, PER MIN: Performed by: UROLOGY

## 2024-11-13 PROCEDURE — 250N000013 HC RX MED GY IP 250 OP 250 PS 637: Performed by: UROLOGY

## 2024-11-13 PROCEDURE — 370N000017 HC ANESTHESIA TECHNICAL FEE, PER MIN: Performed by: UROLOGY

## 2024-11-13 PROCEDURE — C2617 STENT, NON-COR, TEM W/O DEL: HCPCS | Performed by: UROLOGY

## 2024-11-13 PROCEDURE — 250N000009 HC RX 250: Performed by: NURSE ANESTHETIST, CERTIFIED REGISTERED

## 2024-11-13 PROCEDURE — 999N000157 HC STATISTIC RCP TIME EA 10 MIN

## 2024-11-13 PROCEDURE — 710N000012 HC RECOVERY PHASE 2, PER MINUTE: Performed by: UROLOGY

## 2024-11-13 PROCEDURE — 250N000011 HC RX IP 250 OP 636: Performed by: UROLOGY

## 2024-11-13 PROCEDURE — C1769 GUIDE WIRE: HCPCS | Performed by: UROLOGY

## 2024-11-13 PROCEDURE — 999N000179 XR SURGERY CARM FLUORO LESS THAN 5 MIN W STILLS: Mod: TC

## 2024-11-13 PROCEDURE — 82365 CALCULUS SPECTROSCOPY: CPT | Performed by: UROLOGY

## 2024-11-13 PROCEDURE — 94640 AIRWAY INHALATION TREATMENT: CPT

## 2024-11-13 PROCEDURE — 52356 CYSTO/URETERO W/LITHOTRIPSY: CPT | Performed by: UROLOGY

## 2024-11-13 RX ORDER — NALOXONE HYDROCHLORIDE 0.4 MG/ML
0.1 INJECTION, SOLUTION INTRAMUSCULAR; INTRAVENOUS; SUBCUTANEOUS
Status: DISCONTINUED | OUTPATIENT
Start: 2024-11-13 | End: 2024-11-13 | Stop reason: HOSPADM

## 2024-11-13 RX ORDER — CEFAZOLIN SODIUM/WATER 3 G/30 ML
3 SYRINGE (ML) INTRAVENOUS
Status: COMPLETED | OUTPATIENT
Start: 2024-11-13 | End: 2024-11-13

## 2024-11-13 RX ORDER — HYDROMORPHONE HYDROCHLORIDE 1 MG/ML
0.2 INJECTION, SOLUTION INTRAMUSCULAR; INTRAVENOUS; SUBCUTANEOUS EVERY 5 MIN PRN
Status: DISCONTINUED | OUTPATIENT
Start: 2024-11-13 | End: 2024-11-13 | Stop reason: HOSPADM

## 2024-11-13 RX ORDER — PROPOFOL 10 MG/ML
INJECTION, EMULSION INTRAVENOUS PRN
Status: DISCONTINUED | OUTPATIENT
Start: 2024-11-13 | End: 2024-11-13

## 2024-11-13 RX ORDER — ONDANSETRON 2 MG/ML
INJECTION INTRAMUSCULAR; INTRAVENOUS PRN
Status: DISCONTINUED | OUTPATIENT
Start: 2024-11-13 | End: 2024-11-13

## 2024-11-13 RX ORDER — LABETALOL 20 MG/4 ML (5 MG/ML) INTRAVENOUS SYRINGE
PRN
Status: DISCONTINUED | OUTPATIENT
Start: 2024-11-13 | End: 2024-11-13

## 2024-11-13 RX ORDER — OXYCODONE HYDROCHLORIDE 5 MG/1
5 TABLET ORAL EVERY 4 HOURS PRN
Status: DISCONTINUED | OUTPATIENT
Start: 2024-11-13 | End: 2024-11-13 | Stop reason: HOSPADM

## 2024-11-13 RX ORDER — DEXAMETHASONE SODIUM PHOSPHATE 10 MG/ML
4 INJECTION, SOLUTION INTRAMUSCULAR; INTRAVENOUS
Status: DISCONTINUED | OUTPATIENT
Start: 2024-11-13 | End: 2024-11-13 | Stop reason: HOSPADM

## 2024-11-13 RX ORDER — HYDROMORPHONE HYDROCHLORIDE 1 MG/ML
0.4 INJECTION, SOLUTION INTRAMUSCULAR; INTRAVENOUS; SUBCUTANEOUS EVERY 5 MIN PRN
Status: DISCONTINUED | OUTPATIENT
Start: 2024-11-13 | End: 2024-11-13 | Stop reason: HOSPADM

## 2024-11-13 RX ORDER — FENTANYL CITRATE 50 UG/ML
25 INJECTION, SOLUTION INTRAMUSCULAR; INTRAVENOUS EVERY 5 MIN PRN
Status: DISCONTINUED | OUTPATIENT
Start: 2024-11-13 | End: 2024-11-13 | Stop reason: HOSPADM

## 2024-11-13 RX ORDER — CEFAZOLIN SODIUM/WATER 3 G/30 ML
3 SYRINGE (ML) INTRAVENOUS SEE ADMIN INSTRUCTIONS
Status: DISCONTINUED | OUTPATIENT
Start: 2024-11-13 | End: 2024-11-13 | Stop reason: HOSPADM

## 2024-11-13 RX ORDER — ONDANSETRON 2 MG/ML
4 INJECTION INTRAMUSCULAR; INTRAVENOUS EVERY 30 MIN PRN
Status: DISCONTINUED | OUTPATIENT
Start: 2024-11-13 | End: 2024-11-13 | Stop reason: HOSPADM

## 2024-11-13 RX ORDER — SODIUM CHLORIDE, SODIUM LACTATE, POTASSIUM CHLORIDE, CALCIUM CHLORIDE 600; 310; 30; 20 MG/100ML; MG/100ML; MG/100ML; MG/100ML
INJECTION, SOLUTION INTRAVENOUS CONTINUOUS
Status: DISCONTINUED | OUTPATIENT
Start: 2024-11-13 | End: 2024-11-13 | Stop reason: HOSPADM

## 2024-11-13 RX ORDER — OXYCODONE HYDROCHLORIDE 5 MG/1
5 TABLET ORAL EVERY 6 HOURS PRN
Qty: 8 TABLET | Refills: 0 | Status: SHIPPED | OUTPATIENT
Start: 2024-11-13

## 2024-11-13 RX ORDER — SULFAMETHOXAZOLE AND TRIMETHOPRIM 800; 160 MG/1; MG/1
1 TABLET ORAL ONCE
Qty: 1 TABLET | Refills: 0 | Status: SHIPPED | OUTPATIENT
Start: 2024-11-13 | End: 2024-11-13

## 2024-11-13 RX ORDER — LIDOCAINE HYDROCHLORIDE 20 MG/ML
INJECTION, SOLUTION INFILTRATION; PERINEURAL PRN
Status: DISCONTINUED | OUTPATIENT
Start: 2024-11-13 | End: 2024-11-13

## 2024-11-13 RX ORDER — LIDOCAINE 40 MG/G
CREAM TOPICAL
Status: DISCONTINUED | OUTPATIENT
Start: 2024-11-13 | End: 2024-11-13 | Stop reason: HOSPADM

## 2024-11-13 RX ORDER — DEXAMETHASONE SODIUM PHOSPHATE 4 MG/ML
INJECTION, SOLUTION INTRA-ARTICULAR; INTRALESIONAL; INTRAMUSCULAR; INTRAVENOUS; SOFT TISSUE PRN
Status: DISCONTINUED | OUTPATIENT
Start: 2024-11-13 | End: 2024-11-13

## 2024-11-13 RX ORDER — DEXMEDETOMIDINE HYDROCHLORIDE 4 UG/ML
INJECTION, SOLUTION INTRAVENOUS PRN
Status: DISCONTINUED | OUTPATIENT
Start: 2024-11-13 | End: 2024-11-13

## 2024-11-13 RX ORDER — FENTANYL CITRATE 50 UG/ML
50 INJECTION, SOLUTION INTRAMUSCULAR; INTRAVENOUS EVERY 5 MIN PRN
Status: DISCONTINUED | OUTPATIENT
Start: 2024-11-13 | End: 2024-11-13 | Stop reason: HOSPADM

## 2024-11-13 RX ORDER — IPRATROPIUM BROMIDE AND ALBUTEROL SULFATE 2.5; .5 MG/3ML; MG/3ML
3 SOLUTION RESPIRATORY (INHALATION) ONCE
Status: DISCONTINUED | OUTPATIENT
Start: 2024-11-13 | End: 2024-11-13 | Stop reason: HOSPADM

## 2024-11-13 RX ORDER — IOPAMIDOL 612 MG/ML
INJECTION, SOLUTION INTRATHECAL PRN
Status: DISCONTINUED | OUTPATIENT
Start: 2024-11-13 | End: 2024-11-13 | Stop reason: HOSPADM

## 2024-11-13 RX ORDER — KETAMINE HYDROCHLORIDE 10 MG/ML
INJECTION INTRAMUSCULAR; INTRAVENOUS PRN
Status: DISCONTINUED | OUTPATIENT
Start: 2024-11-13 | End: 2024-11-13

## 2024-11-13 RX ORDER — ONDANSETRON 4 MG/1
4 TABLET, ORALLY DISINTEGRATING ORAL EVERY 30 MIN PRN
Status: DISCONTINUED | OUTPATIENT
Start: 2024-11-13 | End: 2024-11-13 | Stop reason: HOSPADM

## 2024-11-13 RX ADMIN — OXYCODONE HYDROCHLORIDE 5 MG: 5 TABLET ORAL at 11:42

## 2024-11-13 RX ADMIN — DEXAMETHASONE SODIUM PHOSPHATE 10 MG: 4 INJECTION, SOLUTION INTRA-ARTICULAR; INTRALESIONAL; INTRAMUSCULAR; INTRAVENOUS; SOFT TISSUE at 08:51

## 2024-11-13 RX ADMIN — LABETALOL 20 MG/4 ML (5 MG/ML) INTRAVENOUS SYRINGE 10 MG: at 10:17

## 2024-11-13 RX ADMIN — PHENYLEPHRINE HYDROCHLORIDE 100 MCG: 10 INJECTION INTRAVENOUS at 09:30

## 2024-11-13 RX ADMIN — DEXMEDETOMIDINE HYDROCHLORIDE 4 MCG: 4 INJECTION, SOLUTION INTRAVENOUS at 08:43

## 2024-11-13 RX ADMIN — ONDANSETRON 4 MG: 2 INJECTION INTRAMUSCULAR; INTRAVENOUS at 08:51

## 2024-11-13 RX ADMIN — PROPOFOL 200 MG: 10 INJECTION, EMULSION INTRAVENOUS at 08:44

## 2024-11-13 RX ADMIN — Medication 10 MG: at 09:17

## 2024-11-13 RX ADMIN — PROPOFOL 50 MG: 10 INJECTION, EMULSION INTRAVENOUS at 08:59

## 2024-11-13 RX ADMIN — MIDAZOLAM 2 MG: 1 INJECTION INTRAMUSCULAR; INTRAVENOUS at 08:38

## 2024-11-13 RX ADMIN — DEXMEDETOMIDINE HYDROCHLORIDE 4 MCG: 4 INJECTION, SOLUTION INTRAVENOUS at 08:59

## 2024-11-13 RX ADMIN — SODIUM CHLORIDE, POTASSIUM CHLORIDE, SODIUM LACTATE AND CALCIUM CHLORIDE: 600; 310; 30; 20 INJECTION, SOLUTION INTRAVENOUS at 07:36

## 2024-11-13 RX ADMIN — Medication 200 MG: at 10:28

## 2024-11-13 RX ADMIN — ROCURONIUM BROMIDE 20 MG: 10 INJECTION INTRAVENOUS at 08:59

## 2024-11-13 RX ADMIN — LIDOCAINE HYDROCHLORIDE 80 MG: 20 INJECTION, SOLUTION INFILTRATION; PERINEURAL at 08:44

## 2024-11-13 RX ADMIN — PHENYLEPHRINE HYDROCHLORIDE 100 MCG: 10 INJECTION INTRAVENOUS at 09:48

## 2024-11-13 RX ADMIN — CEFAZOLIN 3 G: 10 INJECTION, POWDER, FOR SOLUTION INTRAVENOUS; PARENTERAL at 08:33

## 2024-11-13 RX ADMIN — FENTANYL CITRATE 10 MCG: 50 INJECTION INTRAMUSCULAR; INTRAVENOUS at 08:44

## 2024-11-13 RX ADMIN — PHENYLEPHRINE HYDROCHLORIDE 100 MCG: 10 INJECTION INTRAVENOUS at 09:36

## 2024-11-13 RX ADMIN — SODIUM CHLORIDE, POTASSIUM CHLORIDE, SODIUM LACTATE AND CALCIUM CHLORIDE: 600; 310; 30; 20 INJECTION, SOLUTION INTRAVENOUS at 10:01

## 2024-11-13 RX ADMIN — Medication 10 MG: at 09:37

## 2024-11-13 RX ADMIN — LIDOCAINE HYDROCHLORIDE 80 MG: 20 INJECTION, SOLUTION INFILTRATION; PERINEURAL at 10:00

## 2024-11-13 RX ADMIN — Medication 160 MG: at 08:44

## 2024-11-13 RX ADMIN — Medication 30 MG: at 08:44

## 2024-11-13 RX ADMIN — DEXMEDETOMIDINE HYDROCHLORIDE 4 MCG: 4 INJECTION, SOLUTION INTRAVENOUS at 08:51

## 2024-11-13 RX ADMIN — PHENYLEPHRINE HYDROCHLORIDE 100 MCG: 10 INJECTION INTRAVENOUS at 09:45

## 2024-11-13 ASSESSMENT — ACTIVITIES OF DAILY LIVING (ADL)
ADLS_ACUITY_SCORE: 0

## 2024-11-13 NOTE — OR NURSING
PACU Respiratory Event Documentation     1) Episodes of Apnea greater than or equal to 10 seconds: 0     2) Bradypnea - less than 8 breaths per minute: 0     3) Pain score on 0 to 10 scale: 4    4) Pain-sedation mismatch (yes or no): NA     5) Repeated 02 desaturation less than 90% (yes or no): NA     Anesthesia notified? (yes or no): NA     Any of the above events occuring repeatedly in separate 30 minute intervals may be considered recurrent PACU respiratory events.

## 2024-11-13 NOTE — ANESTHESIA POSTPROCEDURE EVALUATION
Patient: Philipp Jefferson    Procedure: Procedure(s):  CYSTOURETEROSCOPY, WITH LITHOTRIPSY USING LASER AND URETERAL STENT INSERTION WITH STONE EXTRACTION       Anesthesia Type:  General    Note:  Disposition: Outpatient   Postop Pain Control: Uneventful            Sign Out: Well controlled pain   PONV: No   Neuro/Psych: Uneventful            Sign Out: Acceptable/Baseline neuro status   Airway/Respiratory: Uneventful            Sign Out: Acceptable/Baseline resp. status   CV/Hemodynamics: Uneventful            Sign Out: Acceptable CV status; No obvious hypovolemia; No obvious fluid overload   Other NRE: NONE   DID A NON-ROUTINE EVENT OCCUR? No           Last vitals:  Vitals Value Taken Time   /71 11/13/24 1105   Temp 97.2  F (36.2  C) 11/13/24 1100   Pulse 85 11/13/24 1105   Resp 20 11/13/24 1105   SpO2 95 % 11/13/24 1105   Vitals shown include unfiled device data.    Electronically Signed By: RUPERTO Low CRNA  November 13, 2024  1:32 PM

## 2024-11-13 NOTE — ANESTHESIA PROCEDURE NOTES
Airway       Patient location during procedure: OR       Procedure Start/Stop Times: 11/13/2024 8:46 AM and 11/13/2024 8:46 AM  Staff -        CRNA: Ole Gil APRN CRNA       Performed By: CRNA  Consent for Airway        Urgency: elective  Indications and Patient Condition       Indications for airway management: soham-procedural       Induction type:intravenous       Mask difficulty assessment: 2 - vent by mask + OA or adjuvant +/- NMBA    Final Airway Details       Final airway type: endotracheal airway       Successful airway: ETT - single and Oral  Endotracheal Airway Details        ETT size (mm): 7.5       Successful intubation technique: video laryngoscopy       VL Blade Size: Dorman 4       Grade View of Cords: 1       Adjucts: stylet       Position: Right       Measured from: lips       Secured at (cm): 21       Bite block used: None    Post intubation assessment        Placement verified by: capnometry, equal breath sounds and chest rise        Number of attempts at approach: 1       Secured with: plastic tape       Ease of procedure: easy       Dentition: Intact and Unchanged    Medication(s) Administered   Medication Administration Time: 11/13/2024 8:46 AM

## 2024-11-13 NOTE — OR NURSING
Patient and responsible adult given discharge instructions with no questions regarding instructions. Megha score 19. Pain level 5/10.  Discharged from unit via wheelchair. Patient discharged to home.

## 2024-11-13 NOTE — OP NOTE
[facility] Operative Note    Pre-operative diagnosis: Ureteral stone [N20.1]   Post-operative diagnosis left renal stone   Procedure: Procedure(s):  CYSTOURETEROSCOPY, WITH LITHOTRIPSY USING LASER AND URETERAL STENT INSERTION WITH STONE EXTRACTION   Surgeon: Esha Billy MD   Assistants(s): none   Estimated blood loss: None    Specimens: Left renal stone   Findings: Previously placed left ureteral stent retracted into distal ureter.  Removed with ureteroscope.  Flexible left sided ureteroscopy with laser stone treatment, extraction of fragments, placement of stent with string.       Procedure in Detail:  The patient was identified and greeted in the preoperative holding area.  he was marked and consented for the procedure.  he was brought to the operating room and placed supine on the OR table. General anesthesia was administered.  The patient was moved to the dorsal lithotomy position and prepped and draped in the standard fashion.  A surgical timeout was performed.  Preoperative antibiotics were administered.  The 22 Senegalese rigid cystoscope was inserted through the urethra and into the bladder.  The urethra was normal.  Upon entering the bladder the distal coil of the previously placed left ureteral stent was not immediately visible.  The left ureteral orifice was edematous.  Fluoroscopy confirmed that the tip of the stent was several centimeters proximal to the ureteral orifice on the left side.  The semirigid ureteroscope was inserted.  A Glidewire was placed through the ureteroscope and into the ureteral orifice.  The ureteroscope was then advanced.  The tip of the stent was visualized.  The nitinol basket was then positioned and manipulated around the end of the stent.  The basket was closed and the stent pulled down into the bladder.  The cystoscope with the flexible grasper was then advanced, the tip of the stent pulled down and out the urethra.  A Glidewire was placed through the stent and  this was seen to coil on fluoroscopy in the renal pelvis.  The stent was removed.  The cystoscope was removed.  The flexible ureteroscope was then advanced.  It was attempted to be inserted alongside the wire and into the distal ureter utilizing a second wire.  The ureteral orifice was quite edematous however, and the scope would not advance.  The semirigid ureteroscope was able to be advanced into the distal ureter and up to the level of the pelvic brim with the aid of a second wire.  Another attempt was made with the flexible scope however would not enter the ureteral orifice.  A second wire was left in position in the ureter under direct vision.  A 12 Greenlandic ureteral access sheath was then advanced under fluoroscopic guidance with minimal resistance through the ureteral orifice and up to the level of the pelvic brim.  The inner cannula of the sheath was removed.  The flexible ureteroscope was then able to be advanced through the sheath and into the ureter.  The remainder of the ureter was widely patent and easily accommodated the scope.  The area of previous stone impaction in the proximal ureter was slightly edematous, but the stone was not seen.  The scope was advanced into the kidney, and the stone encountered in an upper pole calyx.  A 200  m fiber for the thulium laser was inserted.  Using the kidney stone presets on dusting setting, the stone was then treated.  Most of the stone was dusted, however there were a couple of small fragments remaining.  These were extracted using the basket.  A mapping pyelogram was performed by instillation of contrast into the collecting system.  The calyces were then systematically inspected under fluoroscopic guidance.  There was no significant stone debris remaining.  The ureteroscope was removed.  The ureter was inspected as the access sheath was withdrawn and there was no sign of injury.  The distal ureter again was noted to be quite edematous around the area of the  ureteral orifice.  The safety wire was backloaded into the cystoscope.  A 6 Lithuanian by 30 cm stent was then advanced over the wire in the standard fashion.  The proximal coil was in good position on fluoroscopy in the renal pelvis and the distal coil was visibly in good position within the bladder.  The string was left on the stent.  The bladder was emptied and the cystoscope removed.  The string was coiled and secured to the shaft of the penis using Mastisol and Steri-Strips.  The patient had a prolonged wake up from anesthesia but eventually was taken to recovery in stable condition.    Plan: The patient will remove his stent at home in 1 week.  Follow-up in urology in 6 weeks with a renal ultrasound prior.

## 2024-11-13 NOTE — DISCHARGE INSTRUCTIONS
On Wednesday 11/20/2024, take your single antibiotic pill (sulfamethoxazole-trimethoprim) and remove your stent by pulling the string coming out your urethra.    If your stent is accidentally removed at any point, take your antibiotic pill.    It can be normal to have a small amount of flank pain after stent removal.  Drink plenty of water.      Seek evaluation in the emergency department if you have a fever or if you cannot urinate.    Remember that it is NORMAL to have blood in the urine, pain in the flank, bladder, urethra, and penis area with the stent in place.  It is especially important to hydrate well with the stent in place.        's nurse will call you to make a 6 week appoinment  After Anesthesia (Sleep Medicine)  What should I do after anesthesia?  You should rest and relax for the next 24 hours. Avoid risky or difficult (strenuous) activity. A responsible adult should stay with you overnight.  Don't drive or use any heavy equipment for 24 hours. Even if you feel normal, your reactions may be affected by the sleep medicine given to you.  Don't drink alcohol or make any important decisions for 24 hours.  Slowly get back to your regular diet, as you feel able.  How should I expect to feel?  It's normal to feel dizzy, light-headed, or faint for up to a full day after anesthesia or while taking pain medicine. If this happens:   Sit down for a few minutes before standing.  Have someone help you when you get up to walk or use the bathroom.  If you have nausea (feel sick to your stomach) or vomit (throw up):   Drink clear liquids (such as apple juice, ginger ale, broth, or 7UP) until you feel better.  If you feel sick to your stomach, or you keep vomiting for 24 hours, please call the doctor.  What else should I know?  You might have a dry mouth, sore throat, muscle aches, or trouble sleeping. These should go away after 24 hours.  Please contact your doctor if you have any other symptoms that concern  you, such as fever, pain, bleeding, fluid drainage, swelling, or headache, or if it's been over 8 to 10 hours and you still aren't able to pee (urinate).  If you have a history of sleep apnea, it's very important to use your CPAP machine for the next 24 hours when you nap or sleep.   For informational purposes only. Not to replace the advice of your health care provider. Copyright   2023 [facility]. All rights reserved. Clinically reviewed by Carter Osman MD. [employer] [id number] - REV 09/23.

## 2024-11-14 RX ORDER — FENTANYL CITRATE 50 UG/ML
INJECTION, SOLUTION INTRAMUSCULAR; INTRAVENOUS PRN
Status: DISCONTINUED | OUTPATIENT
Start: 2024-11-13 | End: 2024-11-14

## 2024-11-14 NOTE — ADDENDUM NOTE
Addendum  created 11/14/24 1223 by Luis Armando Alarcon APRN CRNA    Intraprocedure Meds edited

## 2024-11-18 ENCOUNTER — TELEPHONE (OUTPATIENT)
Dept: UROLOGY | Facility: OTHER | Age: 69
End: 2024-11-18

## 2024-11-18 DIAGNOSIS — N20.1 URETERAL STONE: Primary | ICD-10-CM

## 2024-11-18 LAB
APPEARANCE STONE: NORMAL
COMPN STONE: NORMAL
SPECIMEN WT: 12 MG

## 2024-11-19 RX ORDER — TAMSULOSIN HYDROCHLORIDE 0.4 MG/1
0.4 CAPSULE ORAL DAILY
Qty: 90 CAPSULE | Refills: 3 | Status: SHIPPED | OUTPATIENT
Start: 2024-11-19 | End: 2025-11-14

## 2024-11-19 RX ORDER — SULFAMETHOXAZOLE AND TRIMETHOPRIM 800; 160 MG/1; MG/1
1 TABLET ORAL ONCE
Qty: 1 TABLET | Refills: 0 | Status: SHIPPED | OUTPATIENT
Start: 2024-11-19 | End: 2024-11-19

## 2024-11-25 DIAGNOSIS — N20.1 URETERAL STONE: Primary | ICD-10-CM

## 2024-11-26 ENCOUNTER — TELEPHONE (OUTPATIENT)
Dept: UROLOGY | Facility: OTHER | Age: 69
End: 2024-11-26

## 2024-12-03 ENCOUNTER — OFFICE VISIT (OUTPATIENT)
Dept: OTOLARYNGOLOGY | Facility: OTHER | Age: 69
End: 2024-12-03
Attending: OTOLARYNGOLOGY
Payer: MEDICARE

## 2024-12-03 DIAGNOSIS — J38.3 LESION OF VOCAL CORD: Primary | ICD-10-CM

## 2024-12-03 PROCEDURE — G0463 HOSPITAL OUTPT CLINIC VISIT: HCPCS

## 2024-12-03 NOTE — NURSING NOTE
Patient is here at ENT for a possible cancer spot on throat.  Meredith Cha on 12/3/2024 at 10:29 AM

## 2024-12-10 NOTE — PROGRESS NOTES
document embedded image                                   [facility]                                                                                                                                         Patient Name: Philipp Jefferson   Address: [address]    YOB: 1955   [place] [address]   MR Number: [id number]   Phone: [phone]  PCP: OUTPATIENT,SHOAIB GOLDBERG           Appointment Date: 12/03/24  Visit Provider: Subhash Sanchez MD    cc: ~    ENT Progress Note        Intake  Visit Reasons: Possible cancer spot on throat f/u    HPI  History of Present Illness  Chief complaint:  Follow up left anterior vocal cord lesion     History  The patient is a 69-year-old man with a complex medical history who was noted to have a area of hemorrhage or discoloration of his anterior vocal cord on the left.  He was asked to follow up at this time to make sure this has not changed.  He denies any voice changes or swallowing difficulties.  He has had no hemoptysis or hematemesis.    Exam   Oral cavity oropharynx-free of mucosal lesion   Neck-no masses or adenopathy   Nasal-free of obstruction or purulence   Indirect laryngoscopy-I get the full length of his vocal cords.  He has a little area of purple discoloration of his anterior left vocal cord.  This has the appearance of a telangiectasia or dilated vein.  There is no evidence of progressive neoplasm.  Head and neck integument-Clear  General-appears well and in no distress  Neuro-there are no focal cranial nerve deficits appreciated    A&P  Assessment & Plan  (1) Lesion of vocal cord:         Status: Acute        Code(s):  J38.3 - Other diseases of vocal cords  This small area of discoloration of his anterior vocal cord looks like a telangiectasia or a varicosity.  There is no evidence of progressive neoplasm.  He will follow up if he develops progressive symptoms.                Subhash Sanchez MD    Filed: 12/04/24 1006      <Electronically  signed by Subhash Sanchez MD> 12/04/24 8344

## 2024-12-11 ENCOUNTER — TELEPHONE (OUTPATIENT)
Dept: INTERNAL MEDICINE | Facility: OTHER | Age: 69
End: 2024-12-11

## 2024-12-11 ENCOUNTER — HOSPITAL ENCOUNTER (OUTPATIENT)
Dept: GENERAL RADIOLOGY | Facility: OTHER | Age: 69
Discharge: HOME | End: 2024-12-11
Attending: INTERNAL MEDICINE
Payer: MEDICARE

## 2024-12-11 ENCOUNTER — OFFICE VISIT (OUTPATIENT)
Dept: INTERNAL MEDICINE | Facility: OTHER | Age: 69
End: 2024-12-11
Attending: INTERNAL MEDICINE
Payer: MEDICARE

## 2024-12-11 VITALS
SYSTOLIC BLOOD PRESSURE: 144 MMHG | OXYGEN SATURATION: 93 % | BODY MASS INDEX: 43.5 KG/M2 | HEART RATE: 107 BPM | WEIGHT: 287 LBS | TEMPERATURE: 97.3 F | HEIGHT: 68 IN | RESPIRATION RATE: 18 BRPM | DIASTOLIC BLOOD PRESSURE: 82 MMHG

## 2024-12-11 DIAGNOSIS — R05.1 ACUTE COUGH: ICD-10-CM

## 2024-12-11 DIAGNOSIS — Z87.891 SMOKING HISTORY: ICD-10-CM

## 2024-12-11 DIAGNOSIS — I10 BENIGN ESSENTIAL HYPERTENSION: ICD-10-CM

## 2024-12-11 DIAGNOSIS — J10.1 INFLUENZA A: Primary | ICD-10-CM

## 2024-12-11 LAB
FLUAV RNA SPEC QL NAA+PROBE: POSITIVE
FLUBV RNA RESP QL NAA+PROBE: NEGATIVE
RSV RNA SPEC NAA+PROBE: NEGATIVE
SARS-COV-2 RNA RESP QL NAA+PROBE: NEGATIVE

## 2024-12-11 PROCEDURE — G0463 HOSPITAL OUTPT CLINIC VISIT: HCPCS | Mod: 25

## 2024-12-11 PROCEDURE — 71046 X-RAY EXAM CHEST 2 VIEWS: CPT

## 2024-12-11 PROCEDURE — 87637 SARSCOV2&INF A&B&RSV AMP PRB: CPT | Mod: ZL | Performed by: INTERNAL MEDICINE

## 2024-12-11 RX ORDER — OSELTAMIVIR PHOSPHATE 75 MG/1
75 CAPSULE ORAL 2 TIMES DAILY
Qty: 10 CAPSULE | Refills: 0 | Status: SHIPPED | OUTPATIENT
Start: 2024-12-11 | End: 2024-12-16

## 2024-12-11 ASSESSMENT — ENCOUNTER SYMPTOMS
COUGH: 1
ANOREXIA: 1
HEADACHES: 1
FEVER: 1

## 2024-12-11 ASSESSMENT — PAIN SCALES - GENERAL: PAINLEVEL_OUTOF10: MILD PAIN (3)

## 2024-12-11 NOTE — TELEPHONE ENCOUNTER
Please call patient  - he has Influenza A. I have sent in Tamiflu for this. He also has some indication of influenza pneumonia on his chest xray. He should take the Tamiflu, and monitor his symptoms closely. If he has any worsening symptoms tonight or into tomorrow I would recommend he go into the ER for additional evaluation/treatment.  If he has the ability to check his oxygen at home he should do this and go in if it goes down.  I would also like his to notify his Transplant team and be sure they don't have any additional recommendations.

## 2024-12-11 NOTE — NURSING NOTE
"Chief Complaint   Patient presents with    URI       Initial BP (!) 144/82   Pulse 107   Temp 97.3  F (36.3  C)   Resp 18   Ht 1.727 m (5' 8\")   Wt 130.2 kg (287 lb)   SpO2 93%   BMI 43.64 kg/m   Estimated body mass index is 43.64 kg/m  as calculated from the following:    Height as of this encounter: 1.727 m (5' 8\").    Weight as of this encounter: 130.2 kg (287 lb).  Medication Review: complete    The next two questions are to help us understand your food security.  If you are feeling you need any assistance in this area, we have resources available to support you today.          5/29/2024   SDOH- Food Insecurity   Within the past 12 months, did you worry that your food would run out before you got money to buy more? N    Within the past 12 months, did the food you bought just not last and you didn t have money to get more? N        Patient-reported         Health Care Directive:  Patient does not have a Health Care Directive: Discussed advance care planning with patient; however, patient declined at this time.    Rebecca Remy LPN      "

## 2024-12-11 NOTE — PROGRESS NOTES
"  Assessment & Plan     Acute cough  Influenza A returned positive.  See below.  - Influenza A/B, RSV and SARS-CoV2 PCR (COVID-19) Nose  - XR Chest 2 Views; Future    Smoking history  Strongly encouraged to quit smoking.    Benign essential hypertension  - Blood pressure today of(!) 144/82 is not at the goal of<140/90 however with acute illness this is not unexpected  - Continue current regimen.  Continue to monitor at future visits.  - Cautioned patient to monitor with any OTC medications    Influenza A  Influenza A is positive.  Chest x-ray shows some infiltrates concerning for worsening infection.  Given his immunosuppression he is at high risk.  He will be started on Tamiflu as an outpatient however he is to monitor closely for any worsening symptoms and come into the ER should he develop any concerning symptoms including chest pain, shortness of breath, decreased oxygen levels, confusion, fevers or additional concerns.  He was encouraged to reach out to his transplant team to notify them and verify the use of Tamiflu.  He is to come in with any worsening issues.  - oseltamivir (TAMIFLU) 75 MG capsule; Take 1 capsule (75 mg) by mouth 2 times daily for 5 days.          Nicotine/Tobacco Cessation  He reports that he has been smoking cigarettes. He started smoking about 58 years ago. He has a 52 pack-year smoking history. He has never used smokeless tobacco.  Nicotine/Tobacco Cessation Plan  Information offered: Patient not interested at this time      BMI  Estimated body mass index is 43.64 kg/m  as calculated from the following:    Height as of this encounter: 1.727 m (5' 8\").    Weight as of this encounter: 130.2 kg (287 lb).   Weight management plan: Discussed healthy diet and exercise guidelines      Return if symptoms worsen or fail to improve.    Rachele Segal is a 69 year old, presenting for the following health issues:  URI        12/11/2024     9:53 AM   Additional Questions   Roomed by LOLIS Walls " "    URI  This is a new problem. The current episode started in the past 7 days. The problem occurs constantly. The problem has been gradually worsening. Associated symptoms include anorexia, chest pain, coughing, a fever and headaches. Associated symptoms comments: Low grade fever. The symptoms are aggravated by smoking. He has tried acetaminophen and rest for the symptoms. The treatment provided no relief.      Patient reports that he has been sick over the last 3 to 4 days.  He has been having some cough.  He has also been lightheaded.  He does have some muscle pain with coughing.  He has had a reduction in appetite along with inability to smoke at this time.  He did check for COVID which was negative yesterday.    He does not have any known COPD.  He has had some wheeze off and on.  He is on mycophenolate and cyclosporine for his history of liver transplant.        Objective    BP (!) 144/82   Pulse 107   Temp 97.3  F (36.3  C)   Resp 18   Ht 1.727 m (5' 8\")   Wt 130.2 kg (287 lb)   SpO2 93%   BMI 43.64 kg/m    Body mass index is 43.64 kg/m .  Physical Exam   GEN: Vitals reviewed. Healthy appearing. Patient is in no acute distress. Cooperative with exam.  HEENT: Normocephalic atraumatic.  Eyes grossly normal to inspection.  No discharge or erythema, or obvious scleral/conjunctival abnormalities.   CV: Heart regular in rate and rhythm with no murmur.    LUNGS: No audible wheeze, cough, or visible cyanosis.  No visible retractions or increased work of breathing.  Lungs clear to auscultation bilaterally other than slight wheeze in the right base  ABD:  Obese, nondistended  SKIN: Warm and dry to touch.  Visible skin clear. No significant rash, abnormal pigmentation or lesions.          Signed Electronically by: Maude Hunt, DO    "

## 2024-12-11 NOTE — TELEPHONE ENCOUNTER
Discussed with the pt.  He does not have a way to monitor his O2 levels at home but he will present to the ER if he has any worsening of symptoms.  He will also call his transplant team.  Rebecca Remy LPN on 12/11/2024 at 2:14 PM

## 2024-12-11 NOTE — PATIENT INSTRUCTIONS
Your symptoms are most consistent with a upper respiratory infection.     For symptomatic relief you may try:    Sore throat  - cepacol or other throat lozenges as needed  - gargle salt water (1/2 teaspoon salt in8oz warm water) every 1-2 hours    Headache or sinus pressure or fever  - acetaminophen 1000mg every 6 hours as needed    General  - get extra rest  - drink plenty of fluid  - avoid tobacco products    You will need to beevaluated if you start to experience:   Fever higher than 102.5 F (39.2 C)   Worsening of current symptoms  Sudden and severe pain in the face and head or trouble seeing or thinking clearly   Swelling or redness around 1 or both eyes  Trouble breathing, chest pain or a stiff neck    *If you are a smoker, try to quit *

## 2024-12-18 ENCOUNTER — HOSPITAL ENCOUNTER (OUTPATIENT)
Dept: ULTRASOUND IMAGING | Facility: HOSPITAL | Age: 69
Discharge: HOME | End: 2024-12-18
Attending: UROLOGY
Payer: MEDICARE

## 2024-12-18 DIAGNOSIS — N20.1 URETERAL STONE: ICD-10-CM

## 2024-12-18 PROCEDURE — 76770 US EXAM ABDO BACK WALL COMP: CPT

## 2025-02-19 ENCOUNTER — TELEPHONE (OUTPATIENT)
Dept: INTERNAL MEDICINE | Facility: OTHER | Age: 70
End: 2025-02-19
Payer: COMMERCIAL

## 2025-02-19 DIAGNOSIS — M54.50 CHRONIC MIDLINE LOW BACK PAIN WITHOUT SCIATICA: ICD-10-CM

## 2025-02-19 DIAGNOSIS — M25.562 CHRONIC PAIN OF BOTH KNEES: Primary | ICD-10-CM

## 2025-02-19 DIAGNOSIS — M46.1 SACROILIITIS: ICD-10-CM

## 2025-02-19 DIAGNOSIS — M25.561 CHRONIC PAIN OF BOTH KNEES: Primary | ICD-10-CM

## 2025-02-19 DIAGNOSIS — G89.29 CHRONIC MIDLINE LOW BACK PAIN WITHOUT SCIATICA: ICD-10-CM

## 2025-02-19 DIAGNOSIS — G89.29 CHRONIC PAIN OF BOTH KNEES: Primary | ICD-10-CM

## 2025-02-19 NOTE — TELEPHONE ENCOUNTER
Patient would beka to get bilateral knee injections, Last left one was 09/30/24 and last right one was 06/24/24. They would also lie to get the Sacroiliac joint injections bi. Last one was 06/10/24.  Orders are teed up please sign if appropriate.  Wendy Samano LPN on 2/19/2025 at 10:33 AM  EXT. [id number]

## 2025-02-19 NOTE — TELEPHONE ENCOUNTER
Patient is requesting to get more injections bilateral knees and lower back.  Please place referral   Any questions call patient back      Thank you   Kanwal Mosqueda on 2/19/2025 at 9:58 AM

## 2025-02-19 NOTE — TELEPHONE ENCOUNTER
After proper verification, patient was relayed message that injections were signed and they would call to make an appointment.  Wendy Samano LPN on 2/19/2025 at 1:03 PM  EXT. [phone]

## 2025-02-20 ENCOUNTER — LAB (OUTPATIENT)
Dept: LAB | Facility: OTHER | Age: 70
End: 2025-02-20
Attending: PHYSICIAN ASSISTANT
Payer: MEDICARE

## 2025-02-20 DIAGNOSIS — Z94.4 LIVER REPLACED BY TRANSPLANT (H): ICD-10-CM

## 2025-02-20 DIAGNOSIS — Z79.899 NEED FOR PROPHYLACTIC CHEMOTHERAPY: ICD-10-CM

## 2025-02-20 LAB
ALBUMIN SERPL BCG-MCNC: 4 G/DL (ref 3.5–5.2)
ALP SERPL-CCNC: 93 U/L (ref 40–150)
ALT SERPL W P-5'-P-CCNC: 14 U/L (ref 0–70)
ANION GAP SERPL CALCULATED.3IONS-SCNC: 14 MMOL/L (ref 7–15)
AST SERPL W P-5'-P-CCNC: 21 U/L (ref 0–45)
BASOPHILS # BLD AUTO: 0 10E3/UL (ref 0–0.2)
BASOPHILS NFR BLD AUTO: 1 %
BILIRUB DIRECT SERPL-MCNC: 0.34 MG/DL (ref 0–0.3)
BILIRUB SERPL-MCNC: 1 MG/DL
BUN SERPL-MCNC: 12.5 MG/DL (ref 8–23)
CALCIUM SERPL-MCNC: 9.3 MG/DL (ref 8.8–10.4)
CHLORIDE SERPL-SCNC: 101 MMOL/L (ref 98–107)
CREAT SERPL-MCNC: 0.9 MG/DL (ref 0.67–1.17)
EGFRCR SERPLBLD CKD-EPI 2021: >90 ML/MIN/1.73M2
EOSINOPHIL # BLD AUTO: 0.1 10E3/UL (ref 0–0.7)
EOSINOPHIL NFR BLD AUTO: 2 %
ERYTHROCYTE [DISTWIDTH] IN BLOOD BY AUTOMATED COUNT: 14 % (ref 10–15)
GLUCOSE SERPL-MCNC: 150 MG/DL (ref 70–99)
HCO3 SERPL-SCNC: 26 MMOL/L (ref 22–29)
HCT VFR BLD AUTO: 42.6 % (ref 40–53)
HGB BLD-MCNC: 15.3 G/DL (ref 13.3–17.7)
IMM GRANULOCYTES # BLD: 0 10E3/UL
IMM GRANULOCYTES NFR BLD: 0 %
LYMPHOCYTES # BLD AUTO: 1.6 10E3/UL (ref 0.8–5.3)
LYMPHOCYTES NFR BLD AUTO: 25 %
MCH RBC QN AUTO: 31.5 PG (ref 26.5–33)
MCHC RBC AUTO-ENTMCNC: 35.9 G/DL (ref 31.5–36.5)
MCV RBC AUTO: 88 FL (ref 78–100)
MONOCYTES # BLD AUTO: 0.4 10E3/UL (ref 0–1.3)
MONOCYTES NFR BLD AUTO: 6 %
NEUTROPHILS # BLD AUTO: 4.2 10E3/UL (ref 1.6–8.3)
NEUTROPHILS NFR BLD AUTO: 66 %
NRBC # BLD AUTO: 0 10E3/UL
NRBC BLD AUTO-RTO: 0 /100
PLATELET # BLD AUTO: 199 10E3/UL (ref 150–450)
POTASSIUM SERPL-SCNC: 3.1 MMOL/L (ref 3.4–5.3)
PROT SERPL-MCNC: 6.8 G/DL (ref 6.4–8.3)
RBC # BLD AUTO: 4.85 10E6/UL (ref 4.4–5.9)
SODIUM SERPL-SCNC: 141 MMOL/L (ref 135–145)
WBC # BLD AUTO: 6.3 10E3/UL (ref 4–11)

## 2025-02-20 PROCEDURE — 85004 AUTOMATED DIFF WBC COUNT: CPT | Mod: ZL

## 2025-02-20 PROCEDURE — 82947 ASSAY GLUCOSE BLOOD QUANT: CPT | Mod: ZL

## 2025-02-20 PROCEDURE — 84460 ALANINE AMINO (ALT) (SGPT): CPT | Mod: ZL

## 2025-02-20 PROCEDURE — 36415 COLL VENOUS BLD VENIPUNCTURE: CPT | Mod: ZL

## 2025-02-20 PROCEDURE — 82248 BILIRUBIN DIRECT: CPT | Mod: ZL

## 2025-02-24 ENCOUNTER — HOSPITAL ENCOUNTER (OUTPATIENT)
Dept: GENERAL RADIOLOGY | Facility: OTHER | Age: 70
Discharge: HOME | End: 2025-02-24
Payer: MEDICARE

## 2025-02-24 DIAGNOSIS — M46.1 SACROILIITIS: ICD-10-CM

## 2025-02-24 PROCEDURE — 250N000011 HC RX IP 250 OP 636: Performed by: RADIOLOGY

## 2025-02-24 PROCEDURE — 255N000002 HC RX 255 OP 636: Performed by: RADIOLOGY

## 2025-02-24 PROCEDURE — 250N000009 HC RX 250: Performed by: RADIOLOGY

## 2025-02-24 PROCEDURE — 27096 INJECT SACROILIAC JOINT: CPT | Mod: 50

## 2025-02-24 RX ORDER — LIDOCAINE HYDROCHLORIDE 10 MG/ML
2 INJECTION, SOLUTION INFILTRATION; PERINEURAL ONCE
Status: COMPLETED | OUTPATIENT
Start: 2025-02-24 | End: 2025-02-24

## 2025-02-24 RX ORDER — TRIAMCINOLONE ACETONIDE 40 MG/ML
40 INJECTION, SUSPENSION INTRA-ARTICULAR; INTRAMUSCULAR ONCE
Status: COMPLETED | OUTPATIENT
Start: 2025-02-24 | End: 2025-02-24

## 2025-02-24 RX ORDER — BUPIVACAINE HYDROCHLORIDE 5 MG/ML
3 INJECTION, SOLUTION EPIDURAL; INTRACAUDAL ONCE
Status: COMPLETED | OUTPATIENT
Start: 2025-02-24 | End: 2025-02-24

## 2025-02-24 RX ADMIN — BUPIVACAINE HYDROCHLORIDE 30 MG: 5 INJECTION, SOLUTION EPIDURAL; INTRACAUDAL; PERINEURAL at 09:17

## 2025-02-24 RX ADMIN — IOHEXOL 2 ML: 240 INJECTION, SOLUTION INTRATHECAL; INTRAVASCULAR; INTRAVENOUS; ORAL at 09:17

## 2025-02-24 RX ADMIN — LIDOCAINE HYDROCHLORIDE 1 ML: 10 INJECTION, SOLUTION INFILTRATION; PERINEURAL at 09:17

## 2025-02-24 RX ADMIN — TRIAMCINOLONE ACETONIDE 80 MG: 40 INJECTION, SUSPENSION INTRA-ARTICULAR; INTRAMUSCULAR at 09:17

## 2025-03-11 ENCOUNTER — TELEPHONE (OUTPATIENT)
Dept: GENERAL RADIOLOGY | Facility: OTHER | Age: 70
End: 2025-03-11
Payer: COMMERCIAL

## 2025-03-11 NOTE — TELEPHONE ENCOUNTER
2 week F/U Bilat SI joint Left Message is patient getting pain relief?  Maxine Crandall, ARRT on 3/11/2025 at 10:30 AM

## 2025-04-08 ENCOUNTER — HOSPITAL ENCOUNTER (OUTPATIENT)
Dept: GENERAL RADIOLOGY | Facility: OTHER | Age: 70
Discharge: HOME | End: 2025-04-08
Payer: MEDICARE

## 2025-04-08 DIAGNOSIS — M25.561 CHRONIC PAIN OF BOTH KNEES: ICD-10-CM

## 2025-04-08 DIAGNOSIS — M25.562 CHRONIC PAIN OF BOTH KNEES: ICD-10-CM

## 2025-04-08 DIAGNOSIS — G89.29 CHRONIC PAIN OF BOTH KNEES: ICD-10-CM

## 2025-04-08 PROCEDURE — 250N000009 HC RX 250: Performed by: RADIOLOGY

## 2025-04-08 PROCEDURE — 96372 THER/PROPH/DIAG INJ SC/IM: CPT | Performed by: RADIOLOGY

## 2025-04-08 PROCEDURE — 20610 DRAIN/INJ JOINT/BURSA W/O US: CPT | Mod: 50

## 2025-04-08 PROCEDURE — 77002 NEEDLE LOCALIZATION BY XRAY: CPT | Mod: XS

## 2025-04-08 PROCEDURE — 250N000011 HC RX IP 250 OP 636: Performed by: RADIOLOGY

## 2025-04-08 PROCEDURE — 255N000002 HC RX 255 OP 636: Performed by: RADIOLOGY

## 2025-04-08 PROCEDURE — 250N000011 HC RX IP 250 OP 636: Mod: JW | Performed by: RADIOLOGY

## 2025-04-08 PROCEDURE — 77002 NEEDLE LOCALIZATION BY XRAY: CPT

## 2025-04-08 RX ORDER — BUPIVACAINE HYDROCHLORIDE 5 MG/ML
10 INJECTION, SOLUTION EPIDURAL; INTRACAUDAL; PERINEURAL ONCE
Status: COMPLETED | OUTPATIENT
Start: 2025-04-08 | End: 2025-04-08

## 2025-04-08 RX ORDER — LIDOCAINE HYDROCHLORIDE 10 MG/ML
20 INJECTION, SOLUTION INFILTRATION; PERINEURAL ONCE
Status: COMPLETED | OUTPATIENT
Start: 2025-04-08 | End: 2025-04-08

## 2025-04-08 RX ORDER — TRIAMCINOLONE ACETONIDE 40 MG/ML
40 INJECTION, SUSPENSION INTRA-ARTICULAR; INTRAMUSCULAR ONCE
Status: COMPLETED | OUTPATIENT
Start: 2025-04-08 | End: 2025-04-08

## 2025-04-08 RX ADMIN — LIDOCAINE HYDROCHLORIDE 1 ML: 10 INJECTION, SOLUTION INFILTRATION; PERINEURAL at 08:42

## 2025-04-08 RX ADMIN — IOHEXOL 1 ML: 240 INJECTION, SOLUTION INTRATHECAL; INTRAVASCULAR; INTRAVENOUS; ORAL at 08:42

## 2025-04-08 RX ADMIN — IOHEXOL 1 ML: 240 INJECTION, SOLUTION INTRATHECAL; INTRAVASCULAR; INTRAVENOUS; ORAL at 08:45

## 2025-04-08 RX ADMIN — TRIAMCINOLONE ACETONIDE 40 MG: 40 INJECTION, SUSPENSION INTRA-ARTICULAR; INTRAMUSCULAR at 08:43

## 2025-04-08 RX ADMIN — LIDOCAINE HYDROCHLORIDE 1 ML: 10 INJECTION, SOLUTION INFILTRATION; PERINEURAL at 08:45

## 2025-04-08 RX ADMIN — TRIAMCINOLONE ACETONIDE 40 MG: 40 INJECTION, SUSPENSION INTRA-ARTICULAR; INTRAMUSCULAR at 08:46

## 2025-04-08 RX ADMIN — BUPIVACAINE HYDROCHLORIDE 3 ML: 5 INJECTION, SOLUTION EPIDURAL; INTRACAUDAL; PERINEURAL at 08:42

## 2025-04-08 RX ADMIN — BUPIVACAINE HYDROCHLORIDE 3 ML: 5 INJECTION, SOLUTION EPIDURAL; INTRACAUDAL; PERINEURAL at 08:46

## 2025-04-22 ENCOUNTER — TELEPHONE (OUTPATIENT)
Dept: GENERAL RADIOLOGY | Facility: OTHER | Age: 70
End: 2025-04-22
Payer: COMMERCIAL

## 2025-04-22 NOTE — TELEPHONE ENCOUNTER
2 week follow up after bilateral knee inj pt states that his knees are feeling good and that the inj has helped  Li Donald, ARRT on 4/22/2025 at 9:39 AM

## 2025-05-19 ENCOUNTER — LAB (OUTPATIENT)
Dept: LAB | Facility: OTHER | Age: 70
End: 2025-05-19
Payer: MEDICARE

## 2025-05-19 DIAGNOSIS — Z79.69 NEED FOR PROPHYLACTIC CHEMOTHERAPY: ICD-10-CM

## 2025-05-19 DIAGNOSIS — Z00.00 ROUTINE GENERAL MEDICAL EXAMINATION AT A HEALTH CARE FACILITY: Primary | ICD-10-CM

## 2025-05-19 DIAGNOSIS — Z94.4 LIVER REPLACED BY TRANSPLANT (H): ICD-10-CM

## 2025-05-19 LAB
ALBUMIN SERPL BCG-MCNC: 3.9 G/DL (ref 3.5–5.2)
ALP SERPL-CCNC: 89 U/L (ref 40–150)
ALT SERPL W P-5'-P-CCNC: 18 U/L (ref 0–70)
ANION GAP SERPL CALCULATED.3IONS-SCNC: 11 MMOL/L (ref 7–15)
AST SERPL W P-5'-P-CCNC: 17 U/L (ref 0–45)
BASOPHILS # BLD AUTO: 0 10E3/UL (ref 0–0.2)
BASOPHILS NFR BLD AUTO: 1 %
BILIRUB DIRECT SERPL-MCNC: 0.23 MG/DL (ref 0–0.3)
BILIRUB SERPL-MCNC: 0.7 MG/DL
BUN SERPL-MCNC: 12.1 MG/DL (ref 8–23)
CALCIUM SERPL-MCNC: 9.2 MG/DL (ref 8.8–10.4)
CHLORIDE SERPL-SCNC: 105 MMOL/L (ref 98–107)
CREAT SERPL-MCNC: 0.81 MG/DL (ref 0.67–1.17)
EGFRCR SERPLBLD CKD-EPI 2021: >90 ML/MIN/1.73M2
EOSINOPHIL # BLD AUTO: 0.1 10E3/UL (ref 0–0.7)
EOSINOPHIL NFR BLD AUTO: 1 %
ERYTHROCYTE [DISTWIDTH] IN BLOOD BY AUTOMATED COUNT: 14.6 % (ref 10–15)
GLUCOSE SERPL-MCNC: 113 MG/DL (ref 70–99)
HCO3 SERPL-SCNC: 23 MMOL/L (ref 22–29)
HCT VFR BLD AUTO: 43.2 % (ref 40–53)
HGB BLD-MCNC: 15.3 G/DL (ref 13.3–17.7)
IMM GRANULOCYTES # BLD: 0 10E3/UL
IMM GRANULOCYTES NFR BLD: 1 %
LYMPHOCYTES # BLD AUTO: 1.6 10E3/UL (ref 0.8–5.3)
LYMPHOCYTES NFR BLD AUTO: 19 %
MCH RBC QN AUTO: 32.8 PG (ref 26.5–33)
MCHC RBC AUTO-ENTMCNC: 35.4 G/DL (ref 31.5–36.5)
MCV RBC AUTO: 93 FL (ref 78–100)
MONOCYTES # BLD AUTO: 0.6 10E3/UL (ref 0–1.3)
MONOCYTES NFR BLD AUTO: 7 %
NEUTROPHILS # BLD AUTO: 5.9 10E3/UL (ref 1.6–8.3)
NEUTROPHILS NFR BLD AUTO: 72 %
NRBC # BLD AUTO: 0 10E3/UL
NRBC BLD AUTO-RTO: 0 /100
PLATELET # BLD AUTO: 176 10E3/UL (ref 150–450)
POTASSIUM SERPL-SCNC: 4.2 MMOL/L (ref 3.4–5.3)
PROT SERPL-MCNC: 6.4 G/DL (ref 6.4–8.3)
RBC # BLD AUTO: 4.66 10E6/UL (ref 4.4–5.9)
SODIUM SERPL-SCNC: 139 MMOL/L (ref 135–145)
WBC # BLD AUTO: 8.2 10E3/UL (ref 4–11)

## 2025-05-19 PROCEDURE — 85018 HEMOGLOBIN: CPT | Mod: ZL

## 2025-05-19 PROCEDURE — 82248 BILIRUBIN DIRECT: CPT | Mod: ZL

## 2025-05-19 PROCEDURE — 84075 ASSAY ALKALINE PHOSPHATASE: CPT | Mod: ZL

## 2025-05-19 PROCEDURE — 99000 SPECIMEN HANDLING OFFICE-LAB: CPT

## 2025-05-19 PROCEDURE — 36415 COLL VENOUS BLD VENIPUNCTURE: CPT | Mod: ZL

## 2025-06-12 ENCOUNTER — PATIENT OUTREACH (OUTPATIENT)
Dept: GASTROENTEROLOGY | Facility: CLINIC | Age: 70
End: 2025-06-12
Payer: COMMERCIAL

## 2025-06-13 ENCOUNTER — RESULTS FOLLOW-UP (OUTPATIENT)
Dept: FAMILY MEDICINE | Facility: OTHER | Age: 70
End: 2025-06-13

## 2025-06-16 ENCOUNTER — TELEPHONE (OUTPATIENT)
Dept: INTERNAL MEDICINE | Facility: OTHER | Age: 70
End: 2025-06-16
Payer: COMMERCIAL

## 2025-06-16 NOTE — TELEPHONE ENCOUNTER
Called patient back with the results as noted.  See lab note.  Shruthi Staton LPN .......6/16/2025 3:37 PM

## 2025-07-07 DIAGNOSIS — I10 BENIGN ESSENTIAL HYPERTENSION: ICD-10-CM

## 2025-07-07 DIAGNOSIS — G25.81 RESTLESS LEG SYNDROME: ICD-10-CM

## 2025-07-10 RX ORDER — LISINOPRIL 10 MG/1
10 TABLET ORAL DAILY
Qty: 90 TABLET | Refills: 0 | Status: SHIPPED | OUTPATIENT
Start: 2025-07-10

## 2025-07-10 RX ORDER — PRAMIPEXOLE DIHYDROCHLORIDE 0.5 MG/1
1 TABLET ORAL DAILY
Qty: 180 TABLET | Refills: 0 | Status: SHIPPED | OUTPATIENT
Start: 2025-07-10

## 2025-07-10 RX ORDER — AMLODIPINE BESYLATE 10 MG/1
10 TABLET ORAL DAILY
Qty: 90 TABLET | Refills: 0 | Status: SHIPPED | OUTPATIENT
Start: 2025-07-10

## 2025-07-10 NOTE — TELEPHONE ENCOUNTER
Reason for call: Medication or medication refill    Have you contacted your pharmacy regarding this refill? yes    If No, direct the patient to contact the Pharmacy and discontinue telephone note using Erroneous Encounter.  If Yes, continue.    Name of medication requested: Amlodapine, Lisinopril, Mirapex    How many days of medication do you have left? 0    What pharmacy do you use? [facility]    Preferred method for responding to this message: Telephone Call    Phone number patient can be reached at: Cell number on file:    Telephone Information:   Mobile [phone]       If we cannot reach you directly, may we leave a detailed response at the number you provided? Yes    Alexandra Samuel on 7/10/2025 at 9:38 AM

## 2025-07-10 NOTE — TELEPHONE ENCOUNTER
[facility] sent Rx request for the following:      Requested Prescriptions   Pending Prescriptions Disp Refills    amLODIPine (NORVASC) 10 MG tablet [Pharmacy Med Name: amLODIPine 10 mg tablet (Norvasc)] 90 tablet 4     Sig: Take 1 tablet (10 mg total) by mouth daily.   Last Prescription Date:   5/29/24  Last Fill Qty/Refills:         90, R-4        lisinopril (ZESTRIL) 10 MG tablet [Pharmacy Med Name: lisinopriL 10 mg tablet] 90 tablet 4     Sig: Take 1 tablet (10 mg total) by mouth daily.   Last Prescription Date:   5/29/24  Last Fill Qty/Refills:         90, R-4        pramipexole (MIRAPEX) 0.5 MG tablet [Pharmacy Med Name: pramipexole 0.5 mg tablet (Mirapex)] 180 tablet 4     Sig: Take 2 tablets (1 mg total) by mouth at bedtime.   Last Prescription Date:   5/29/24  Last Fill Qty/Refills:         180, R-4      Antiparkinson's Agents Protocol Failed - 7/10/2025  9:46 AM        Failed - Medication is active on med list and the sig matches. RN to manually verify dose and sig if red X/fail.     If the protocol passes (green check), you do not need to verify med dose and sig.    A prescription matches if they are the same clinical intention.    For Example: once daily and every morning are the same.    The protocol can not identify upper and lower case letters as matching and will fail.     For Example: Take 1 tablet (50 mg) by mouth daily     TAKE 1 TABLET (50 MG) BY MOUTH DAILY    For all fails (red x), verify dose and sig.    If the refill does match what is on file, the RN can still proceed to approve the refill request.       If they do not match, route to the appropriate provider.     Last Office Visit:                6/13/25 12/11/24    Future Office visit:            None    Unable to complete prescription refill per RN Medication Refill Policy. Routing to covering provider for refill consideration, as PCP/provider is out of clinic >48 hours or Pt is completely out of  medication and provider is out of the clinic today. Rosanne Cline RN .............. 7/10/2025  9:59 AM

## 2025-08-20 DIAGNOSIS — R60.0 BILATERAL LOWER EXTREMITY EDEMA: ICD-10-CM

## 2025-08-20 DIAGNOSIS — I10 BENIGN ESSENTIAL HYPERTENSION: ICD-10-CM

## 2025-08-20 RX ORDER — FUROSEMIDE 40 MG/1
40 TABLET ORAL 2 TIMES DAILY
Qty: 180 TABLET | Refills: 0 | Status: SHIPPED | OUTPATIENT
Start: 2025-08-20

## (undated) DEVICE — JELLY LUBRICATING SURGILUBE 2OZ TUBE

## (undated) DEVICE — TUBING IRR TUR Y TYPE 2C4041

## (undated) DEVICE — DRAPE C-ARM 60X42" 1013

## (undated) DEVICE — Device

## (undated) DEVICE — CONTAINER SPECIMEN 4OZ STERILE H1015I

## (undated) DEVICE — SOL WATER IRRIG 1000ML BOTTLE 2F7114

## (undated) DEVICE — BIN-UROLOGY / CYSTO BN47

## (undated) DEVICE — BAG PRESSURE INFUSION 3000ML COLORED GA 8809

## (undated) DEVICE — TRAY SKIN PREP POVIDONE/IODINE DYND70372

## (undated) DEVICE — SLEEVE SCD EXPRESS KNEE LENGTH MED 9529

## (undated) DEVICE — CATHETER SOF-FLEX OPEN END URETERAL 70CM/5FR 020038-C5

## (undated) DEVICE — WIRE GLIDE 0.035"X150CM 3CM STR REG UWR1035

## (undated) DEVICE — LABEL STERILE PREPRINTED FOR OR FRRH01-2M

## (undated) DEVICE — PACK BASIN SET UP SUTCNBSBBA

## (undated) DEVICE — COVER LT HANDLE 2/PK 5160-2FG

## (undated) DEVICE — BAG UROLOGICAL TABLE URO CATCHER P056397909

## (undated) DEVICE — FIBER LASER 200 UM DISPOSABLE TFL-FBX200S

## (undated) DEVICE — SOL NACL 0.9% IRRIG 3000ML BAG 2B7477

## (undated) DEVICE — PACK GYN CYSTO CUSTOM SMA32GCMBF

## (undated) DEVICE — SOL NACL 0.9% IRRIG 1000ML BOTTLE 2F7124

## (undated) RX ORDER — FENTANYL CITRATE-0.9 % NACL/PF 10 MCG/ML
PLASTIC BAG, INJECTION (ML) INTRAVENOUS
Status: DISPENSED
Start: 2024-11-13

## (undated) RX ORDER — TRIAMCINOLONE ACETONIDE 40 MG/ML
INJECTION, SUSPENSION INTRA-ARTICULAR; INTRAMUSCULAR
Status: DISPENSED
Start: 2024-02-12

## (undated) RX ORDER — LIDOCAINE HYDROCHLORIDE 10 MG/ML
INJECTION, SOLUTION INFILTRATION; PERINEURAL
Status: DISPENSED
Start: 2024-06-24

## (undated) RX ORDER — ONDANSETRON 2 MG/ML
INJECTION INTRAMUSCULAR; INTRAVENOUS
Status: DISPENSED
Start: 2024-11-13

## (undated) RX ORDER — LIDOCAINE HYDROCHLORIDE 10 MG/ML
INJECTION, SOLUTION INFILTRATION; PERINEURAL
Status: DISPENSED
Start: 2018-02-13

## (undated) RX ORDER — LIDOCAINE HYDROCHLORIDE 10 MG/ML
INJECTION, SOLUTION INFILTRATION; PERINEURAL
Status: DISPENSED
Start: 2018-03-27

## (undated) RX ORDER — PROPOFOL 10 MG/ML
INJECTION, EMULSION INTRAVENOUS
Status: DISPENSED
Start: 2024-10-30

## (undated) RX ORDER — PROPOFOL 10 MG/ML
INJECTION, EMULSION INTRAVENOUS
Status: DISPENSED
Start: 2024-11-13

## (undated) RX ORDER — LIDOCAINE HYDROCHLORIDE 10 MG/ML
INJECTION, SOLUTION INFILTRATION; PERINEURAL
Status: DISPENSED
Start: 2025-04-08

## (undated) RX ORDER — FENTANYL CITRATE 50 UG/ML
INJECTION, SOLUTION INTRAMUSCULAR; INTRAVENOUS
Status: DISPENSED
Start: 2024-11-13

## (undated) RX ORDER — FENTANYL CITRATE 50 UG/ML
INJECTION, SOLUTION INTRAMUSCULAR; INTRAVENOUS
Status: DISPENSED
Start: 2024-10-30

## (undated) RX ORDER — LIDOCAINE HYDROCHLORIDE 10 MG/ML
INJECTION, SOLUTION INFILTRATION; PERINEURAL
Status: DISPENSED
Start: 2024-02-12

## (undated) RX ORDER — LIDOCAINE HYDROCHLORIDE 10 MG/ML
INJECTION, SOLUTION INFILTRATION; PERINEURAL
Status: DISPENSED
Start: 2018-03-07

## (undated) RX ORDER — HYDROMORPHONE HYDROCHLORIDE 1 MG/ML
INJECTION, SOLUTION INTRAMUSCULAR; INTRAVENOUS; SUBCUTANEOUS
Status: DISPENSED
Start: 2018-07-21

## (undated) RX ORDER — LABETALOL HYDROCHLORIDE 5 MG/ML
INJECTION, SOLUTION INTRAVENOUS
Status: DISPENSED
Start: 2024-11-13

## (undated) RX ORDER — BUPIVACAINE HYDROCHLORIDE 5 MG/ML
INJECTION, SOLUTION EPIDURAL; INTRACAUDAL; PERINEURAL
Status: DISPENSED
Start: 2025-04-08

## (undated) RX ORDER — LIDOCAINE HYDROCHLORIDE 10 MG/ML
INJECTION, SOLUTION INFILTRATION; PERINEURAL
Status: DISPENSED
Start: 2025-02-24

## (undated) RX ORDER — TRIAMCINOLONE ACETONIDE 40 MG/ML
INJECTION, SUSPENSION INTRA-ARTICULAR; INTRAMUSCULAR
Status: DISPENSED
Start: 2024-06-24

## (undated) RX ORDER — DEXAMETHASONE SODIUM PHOSPHATE 10 MG/ML
INJECTION, SOLUTION INTRAMUSCULAR; INTRAVENOUS
Status: DISPENSED
Start: 2024-11-13

## (undated) RX ORDER — TRIAMCINOLONE ACETONIDE 40 MG/ML
INJECTION, SUSPENSION INTRA-ARTICULAR; INTRAMUSCULAR
Status: DISPENSED
Start: 2025-02-24

## (undated) RX ORDER — BUPIVACAINE HYDROCHLORIDE 5 MG/ML
INJECTION, SOLUTION EPIDURAL; INTRACAUDAL
Status: DISPENSED
Start: 2024-06-24

## (undated) RX ORDER — TRIAMCINOLONE ACETONIDE 40 MG/ML
INJECTION, SUSPENSION INTRA-ARTICULAR; INTRAMUSCULAR
Status: DISPENSED
Start: 2025-04-08

## (undated) RX ORDER — LIDOCAINE HYDROCHLORIDE 10 MG/ML
INJECTION, SOLUTION INFILTRATION; PERINEURAL
Status: DISPENSED
Start: 2018-04-10

## (undated) RX ORDER — BUPIVACAINE HYDROCHLORIDE 5 MG/ML
INJECTION, SOLUTION EPIDURAL; INTRACAUDAL
Status: DISPENSED
Start: 2024-02-12

## (undated) RX ORDER — LIDOCAINE HYDROCHLORIDE 10 MG/ML
INJECTION, SOLUTION INFILTRATION; PERINEURAL
Status: DISPENSED
Start: 2018-05-29

## (undated) RX ORDER — LIDOCAINE HYDROCHLORIDE 10 MG/ML
INJECTION, SOLUTION INFILTRATION; PERINEURAL
Status: DISPENSED
Start: 2018-06-12

## (undated) RX ORDER — TRIAMCINOLONE ACETONIDE 40 MG/ML
INJECTION, SUSPENSION INTRA-ARTICULAR; INTRAMUSCULAR
Status: DISPENSED
Start: 2024-06-10

## (undated) RX ORDER — DEXAMETHASONE SODIUM PHOSPHATE 10 MG/ML
INJECTION, SOLUTION INTRAMUSCULAR; INTRAVENOUS
Status: DISPENSED
Start: 2024-10-30

## (undated) RX ORDER — LIDOCAINE HYDROCHLORIDE 10 MG/ML
INJECTION, SOLUTION INFILTRATION; PERINEURAL
Status: DISPENSED
Start: 2018-05-08

## (undated) RX ORDER — LIDOCAINE HYDROCHLORIDE 10 MG/ML
INJECTION, SOLUTION INFILTRATION; PERINEURAL
Status: DISPENSED
Start: 2018-05-22

## (undated) RX ORDER — NALOXONE HYDROCHLORIDE 0.4 MG/ML
INJECTION, SOLUTION INTRAMUSCULAR; INTRAVENOUS; SUBCUTANEOUS
Status: DISPENSED
Start: 2024-10-30

## (undated) RX ORDER — BUPIVACAINE HYDROCHLORIDE 5 MG/ML
INJECTION, SOLUTION EPIDURAL; INTRACAUDAL
Status: DISPENSED
Start: 2025-02-24

## (undated) RX ORDER — LIDOCAINE HYDROCHLORIDE 10 MG/ML
INJECTION, SOLUTION INFILTRATION; PERINEURAL
Status: DISPENSED
Start: 2018-04-03

## (undated) RX ORDER — LIDOCAINE HYDROCHLORIDE 10 MG/ML
INJECTION, SOLUTION INFILTRATION; PERINEURAL
Status: DISPENSED
Start: 2018-03-06

## (undated) RX ORDER — LIDOCAINE HYDROCHLORIDE 10 MG/ML
INJECTION, SOLUTION INFILTRATION; PERINEURAL
Status: DISPENSED
Start: 2018-05-15

## (undated) RX ORDER — LIDOCAINE HYDROCHLORIDE 10 MG/ML
INJECTION, SOLUTION INFILTRATION; PERINEURAL
Status: DISPENSED
Start: 2018-03-20

## (undated) RX ORDER — LIDOCAINE HYDROCHLORIDE 10 MG/ML
INJECTION, SOLUTION INFILTRATION; PERINEURAL
Status: DISPENSED
Start: 2024-06-10

## (undated) RX ORDER — GINSENG 100 MG
CAPSULE ORAL
Status: DISPENSED
Start: 2020-05-05

## (undated) RX ORDER — BUPIVACAINE HYDROCHLORIDE 5 MG/ML
INJECTION, SOLUTION EPIDURAL; INTRACAUDAL
Status: DISPENSED
Start: 2024-06-10

## (undated) RX ORDER — HYDROMORPHONE HYDROCHLORIDE 1 MG/ML
INJECTION, SOLUTION INTRAMUSCULAR; INTRAVENOUS; SUBCUTANEOUS
Status: DISPENSED
Start: 2024-10-30

## (undated) RX ORDER — TRIAMCINOLONE ACETONIDE 40 MG/ML
INJECTION, SUSPENSION INTRA-ARTICULAR; INTRAMUSCULAR
Status: DISPENSED
Start: 2024-09-30

## (undated) RX ORDER — LIDOCAINE HYDROCHLORIDE 10 MG/ML
INJECTION, SOLUTION INFILTRATION; PERINEURAL
Status: DISPENSED
Start: 2018-06-05

## (undated) RX ORDER — ONDANSETRON 2 MG/ML
INJECTION INTRAMUSCULAR; INTRAVENOUS
Status: DISPENSED
Start: 2024-10-30

## (undated) RX ORDER — LIDOCAINE HYDROCHLORIDE 10 MG/ML
INJECTION, SOLUTION INFILTRATION; PERINEURAL
Status: DISPENSED
Start: 2024-09-30

## (undated) RX ORDER — BUPIVACAINE HYDROCHLORIDE AND EPINEPHRINE 5; 5 MG/ML; UG/ML
INJECTION, SOLUTION EPIDURAL; INTRACAUDAL; PERINEURAL
Status: DISPENSED
Start: 2020-05-05

## (undated) RX ORDER — DEXMEDETOMIDINE HYDROCHLORIDE 4 UG/ML
INJECTION, SOLUTION INTRAVENOUS
Status: DISPENSED
Start: 2024-11-13

## (undated) RX ORDER — ALBUTEROL SULFATE 90 UG/1
INHALANT RESPIRATORY (INHALATION)
Status: DISPENSED
Start: 2024-10-30

## (undated) RX ORDER — BUPIVACAINE HYDROCHLORIDE 5 MG/ML
INJECTION, SOLUTION EPIDURAL; INTRACAUDAL
Status: DISPENSED
Start: 2024-09-30

## (undated) RX ORDER — LIDOCAINE HYDROCHLORIDE 10 MG/ML
INJECTION, SOLUTION INFILTRATION; PERINEURAL
Status: DISPENSED
Start: 2018-02-27

## (undated) RX ORDER — LIDOCAINE HYDROCHLORIDE 10 MG/ML
INJECTION, SOLUTION INFILTRATION; PERINEURAL
Status: DISPENSED
Start: 2018-03-13

## (undated) RX ORDER — LIDOCAINE HYDROCHLORIDE 10 MG/ML
INJECTION, SOLUTION INFILTRATION; PERINEURAL
Status: DISPENSED
Start: 2018-05-01

## (undated) RX ORDER — MAGNESIUM HYDROXIDE 1200 MG/15ML
LIQUID ORAL
Status: DISPENSED
Start: 2018-07-16